# Patient Record
Sex: FEMALE | Race: BLACK OR AFRICAN AMERICAN | NOT HISPANIC OR LATINO | Employment: STUDENT | URBAN - METROPOLITAN AREA
[De-identification: names, ages, dates, MRNs, and addresses within clinical notes are randomized per-mention and may not be internally consistent; named-entity substitution may affect disease eponyms.]

---

## 2019-02-06 ENCOUNTER — HOSPITAL ENCOUNTER (EMERGENCY)
Facility: CLINIC | Age: 19
Discharge: HOME | End: 2019-02-06
Attending: EMERGENCY MEDICINE | Admitting: EMERGENCY MEDICINE
Payer: COMMERCIAL

## 2019-02-06 ENCOUNTER — APPOINTMENT (OUTPATIENT)
Dept: GENERAL RADIOLOGY | Facility: CLINIC | Age: 19
End: 2019-02-06
Attending: EMERGENCY MEDICINE
Payer: COMMERCIAL

## 2019-02-06 VITALS
OXYGEN SATURATION: 98 % | DIASTOLIC BLOOD PRESSURE: 79 MMHG | SYSTOLIC BLOOD PRESSURE: 113 MMHG | RESPIRATION RATE: 16 BRPM | BODY MASS INDEX: 21.66 KG/M2 | TEMPERATURE: 98.2 F | HEART RATE: 71 BPM | WEIGHT: 130 LBS | HEIGHT: 65 IN
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
## 2019-02-06 DIAGNOSIS — M54.9 ACUTE MIDLINE BACK PAIN, UNSPECIFIED BACK LOCATION: ICD-10-CM

## 2019-02-06 LAB
ALBUMIN UR-MCNC: NEGATIVE MG/DL
APPEARANCE UR: ABNORMAL
BILIRUB UR QL STRIP: NEGATIVE
COLOR UR AUTO: YELLOW
GLUCOSE UR STRIP-MCNC: NEGATIVE MG/DL
HCG UR QL: NEGATIVE
HGB UR QL STRIP: ABNORMAL
KETONES UR STRIP-MCNC: 80 MG/DL
LEUKOCYTE ESTERASE UR QL STRIP: NEGATIVE
MUCOUS THREADS #/AREA URNS LPF: PRESENT /LPF
NITRATE UR QL: NEGATIVE
PH UR STRIP: 7.5 PH (ref 5–7)
RBC #/AREA URNS AUTO: 0 /HPF (ref 0–2)
SOURCE: ABNORMAL
SP GR UR STRIP: 1.01 (ref 1–1.03)
SQUAMOUS #/AREA URNS AUTO: 2 /HPF (ref 0–1)
TRANS CELLS #/AREA URNS HPF: <1 /HPF (ref 0–1)
UROBILINOGEN UR STRIP-MCNC: NORMAL MG/DL (ref 0–2)
WBC #/AREA URNS AUTO: 2 /HPF (ref 0–5)

## 2019-02-06 PROCEDURE — 25000132 ZZH RX MED GY IP 250 OP 250 PS 637: Performed by: EMERGENCY MEDICINE

## 2019-02-06 PROCEDURE — 81025 URINE PREGNANCY TEST: CPT | Performed by: EMERGENCY MEDICINE

## 2019-02-06 PROCEDURE — 99284 EMERGENCY DEPT VISIT MOD MDM: CPT | Mod: Z6 | Performed by: EMERGENCY MEDICINE

## 2019-02-06 PROCEDURE — 72072 X-RAY EXAM THORAC SPINE 3VWS: CPT

## 2019-02-06 PROCEDURE — 72100 X-RAY EXAM L-S SPINE 2/3 VWS: CPT

## 2019-02-06 PROCEDURE — 99285 EMERGENCY DEPT VISIT HI MDM: CPT | Performed by: EMERGENCY MEDICINE

## 2019-02-06 PROCEDURE — 72040 X-RAY EXAM NECK SPINE 2-3 VW: CPT

## 2019-02-06 PROCEDURE — 81001 URINALYSIS AUTO W/SCOPE: CPT | Performed by: EMERGENCY MEDICINE

## 2019-02-06 RX ORDER — CYCLOBENZAPRINE HCL 10 MG
10 TABLET ORAL 3 TIMES DAILY PRN
Qty: 10 TABLET | Refills: 0 | Status: SHIPPED | OUTPATIENT
Start: 2019-02-06 | End: 2021-02-13

## 2019-02-06 RX ORDER — IBUPROFEN 600 MG/1
600 TABLET, FILM COATED ORAL ONCE
Status: COMPLETED | OUTPATIENT
Start: 2019-02-06 | End: 2019-02-06

## 2019-02-06 RX ORDER — HYDROCODONE BITARTRATE AND ACETAMINOPHEN 5; 325 MG/1; MG/1
1 TABLET ORAL ONCE
Status: COMPLETED | OUTPATIENT
Start: 2019-02-06 | End: 2019-02-06

## 2019-02-06 RX ADMIN — IBUPROFEN 600 MG: 600 TABLET, FILM COATED ORAL at 18:07

## 2019-02-06 RX ADMIN — HYDROCODONE BITARTRATE AND ACETAMINOPHEN 1 TABLET: 5; 325 TABLET ORAL at 23:23

## 2019-02-06 SDOH — HEALTH STABILITY: MENTAL HEALTH: HOW OFTEN DO YOU HAVE A DRINK CONTAINING ALCOHOL?: NEVER

## 2019-02-06 ASSESSMENT — ENCOUNTER SYMPTOMS
EYE REDNESS: 0
FEVER: 0
BACK PAIN: 1
NECK STIFFNESS: 0
NUMBNESS: 0
ABDOMINAL PAIN: 0
CONFUSION: 0
SHORTNESS OF BREATH: 0
DIFFICULTY URINATING: 0
COLOR CHANGE: 0
ARTHRALGIAS: 0
HEADACHES: 0

## 2019-02-06 ASSESSMENT — MIFFLIN-ST. JEOR: SCORE: 1365.56

## 2019-02-06 NOTE — ED TRIAGE NOTES
Pt. BIBA for fall while slipping on ice, pain in lower back, slight red line noted by EMS, no LOC or hitting head, no neuro symptoms. 18 g R A/C. AVSS in triage.

## 2019-02-06 NOTE — ED AVS SNAPSHOT
[facility], [place], Emergency Department  [address] [place] [id number]  Phone:  [phone]                                    Camelia Corea   MRN: [id number]    Department:  [facility], Emergency Department   Date of Visit:  2/6/2019           After Visit Summary Signature Page    I have received my discharge instructions, and my questions have been answered. I have discussed any challenges I see with this plan with the nurse or doctor.    ..........................................................................................................................................  Patient/Patient Representative Signature      ..........................................................................................................................................  Patient Representative Print Name and Relationship to Patient    ..................................................               ................................................  Date                                   Time    ..........................................................................................................................................  Reviewed by Signature/Title    ...................................................              ..............................................  Date                                               Time          22EPIC Rev 08/18

## 2019-02-07 NOTE — ED PROVIDER NOTES
"  History     Chief Complaint   Patient presents with     Back Pain     The history is provided by the patient and medical records.     Camelia Corea is a 18 year old female who presents to the Emergency Department for evaluation after a fall.  The patient reports she slipped on the ice and struck her back when she landed on cement, at approximately 5 PM.  The patient denies striking her head.  The patient reports she has pain in her \"whole\" back.  The patient was able to get up after the fall, and ambulate from the wheelchair to her bed here.  The patient denies numbness or tingling. No bowel or bladder dysfunction. No other symptoms noted.    Social: The patient is here with her mother.    I have reviewed the Medications, Allergies, Past Medical and Surgical History, and Social History in the Epic system.    History reviewed. No pertinent past medical history.    History reviewed. No pertinent surgical history.    History reviewed. No pertinent family history.    Social History     Tobacco Use     Smoking status: Current Every Day Smoker     Packs/day: 0.50     Smokeless tobacco: Never Used   Substance Use Topics     Alcohol use: No     Frequency: Never       No current facility-administered medications for this encounter.      No current outpatient medications on file.      No Known Allergies    Review of Systems   Constitutional: Negative for fever.   HENT: Negative for congestion.    Eyes: Negative for redness.   Respiratory: Negative for shortness of breath.    Cardiovascular: Negative for chest pain.   Gastrointestinal: Negative for abdominal pain.   Genitourinary: Negative for difficulty urinating.   Musculoskeletal: Positive for back pain. Negative for arthralgias and neck stiffness.        Positive for left shoulder pain   Skin: Negative for color change.   Neurological: Negative for numbness and headaches.        Negative for paraesthesias   Psychiatric/Behavioral: Negative for confusion.   All other " "systems reviewed and are negative.      Physical Exam   BP: 98/67  Heart Rate: 76  Temp: 98.2  F (36.8  C)  Resp: 16  Height: 165.1 cm (5' 5\")  Weight: 59 kg (130 lb)  SpO2: 100 %      Physical Exam   Constitutional: She is oriented to person, place, and time. She appears well-developed and well-nourished. No distress.   HENT:   Head: Normocephalic and atraumatic.   Mouth/Throat: No oropharyngeal exudate.   Eyes: Pupils are equal, round, and reactive to light. Right eye exhibits no discharge. Left eye exhibits no discharge. No scleral icterus.   Neck: Normal range of motion. Neck supple.   Cardiovascular: Normal rate, regular rhythm, normal heart sounds and intact distal pulses. Exam reveals no gallop and no friction rub.   No murmur heard.  Pulmonary/Chest: Effort normal and breath sounds normal. No respiratory distress. She has no wheezes. She exhibits no tenderness.   Abdominal: Soft. Bowel sounds are normal. She exhibits no distension. There is no tenderness.   Musculoskeletal: Normal range of motion. She exhibits no edema or deformity.        Thoracic back: She exhibits tenderness and pain. She exhibits no bony tenderness and no deformity.        Lumbar back: She exhibits tenderness and pain. She exhibits no bony tenderness, no swelling and no deformity.   Neurological: She is alert and oriented to person, place, and time. No cranial nerve deficit.   Strength and sensation in lower extremities bilaterally.  2 out of 4 patellar tendon reflexes bilaterally.  No saddle anesthesia.   Skin: Skin is warm and dry. No rash noted. She is not diaphoretic. No erythema. No pallor.   Psychiatric: She has a normal mood and affect.   Nursing note and vitals reviewed.      ED Course   9:55 PM  The patient was seen and examined by Dr. Johnston in Room [facility].   Results for orders placed or performed during the hospital encounter of 02/06/19   XR Cervical Spine 2/3 Views    Narrative    XR CERVICAL SPINE 2/3 VWS, XR LUMBAR SPINE 2-3 " VIEWS, XR THORACIC  SPINE 3 VW  2/6/2019 10:45 PM      HISTORY: Fall, back pain    COMPARISON: None    FINDINGS: AP and lateral views of the cervical, thoracic, and lumbar  spine. Additional odontoid view of the cervical spine was performed.    Cervical spine: No acute fracture or subluxation. There is normal  alignment of the cervical spine and uninterrupted spinolaminar line.  Prevertebral soft tissues unremarkable. Odontoid process is normal.  Lung apices are clear.    Thoracic spine: No acute fracture or subluxation. Clear lungs. No  pneumothorax. The cervicothoracic junction is unremarkable. No  evidence of rib fracture..    Lumbar spine: No acute fracture or subluxation. No vertebral body  height loss. Intervertebral disc spaces are preserved. Partially  visualized sacrum is normal.      Impression    IMPRESSION: No acute fracture or subluxation throughout the cervical,  thoracic and lumbar spine.   XR Thoracic Spine 3 Views    Narrative    XR CERVICAL SPINE 2/3 VWS, XR LUMBAR SPINE 2-3 VIEWS, XR THORACIC  SPINE 3 VW  2/6/2019 10:45 PM      HISTORY: Fall, back pain    COMPARISON: None    FINDINGS: AP and lateral views of the cervical, thoracic, and lumbar  spine. Additional odontoid view of the cervical spine was performed.    Cervical spine: No acute fracture or subluxation. There is normal  alignment of the cervical spine and uninterrupted spinolaminar line.  Prevertebral soft tissues unremarkable. Odontoid process is normal.  Lung apices are clear.    Thoracic spine: No acute fracture or subluxation. Clear lungs. No  pneumothorax. The cervicothoracic junction is unremarkable. No  evidence of rib fracture..    Lumbar spine: No acute fracture or subluxation. No vertebral body  height loss. Intervertebral disc spaces are preserved. Partially  visualized sacrum is normal.      Impression    IMPRESSION: No acute fracture or subluxation throughout the cervical,  thoracic and lumbar spine.   XR Lumbar Spine 2/3  Views    Narrative    XR CERVICAL SPINE 2/3 VWS, XR LUMBAR SPINE 2-3 VIEWS, XR THORACIC  SPINE 3 VW  2/6/2019 10:45 PM      HISTORY: Fall, back pain    COMPARISON: None    FINDINGS: AP and lateral views of the cervical, thoracic, and lumbar  spine. Additional odontoid view of the cervical spine was performed.    Cervical spine: No acute fracture or subluxation. There is normal  alignment of the cervical spine and uninterrupted spinolaminar line.  Prevertebral soft tissues unremarkable. Odontoid process is normal.  Lung apices are clear.    Thoracic spine: No acute fracture or subluxation. Clear lungs. No  pneumothorax. The cervicothoracic junction is unremarkable. No  evidence of rib fracture..    Lumbar spine: No acute fracture or subluxation. No vertebral body  height loss. Intervertebral disc spaces are preserved. Partially  visualized sacrum is normal.      Impression    IMPRESSION: No acute fracture or subluxation throughout the cervical,  thoracic and lumbar spine.   UA reflex to Microscopic and Culture   Result Value Ref Range    Color Urine Yellow     Appearance Urine Slightly Cloudy     Glucose Urine Negative NEG^Negative mg/dL    Bilirubin Urine Negative NEG^Negative    Ketones Urine 80 (A) NEG^Negative mg/dL    Specific Gravity Urine 1.015 1.003 - 1.035    Blood Urine Trace (A) NEG^Negative    pH Urine 7.5 (H) 5.0 - 7.0 pH    Protein Albumin Urine Negative NEG^Negative mg/dL    Urobilinogen mg/dL Normal 0.0 - 2.0 mg/dL    Nitrite Urine Negative NEG^Negative    Leukocyte Esterase Urine Negative NEG^Negative    Source Midstream Urine     RBC Urine 0 0 - 2 /HPF    WBC Urine 2 0 - 5 /HPF    Squamous Epithelial /HPF Urine 2 (H) 0 - 1 /HPF    Transitional Epi <1 0 - 1 /HPF    Mucous Urine Present (A) NEG^Negative /LPF   HCG qualitative urine (UPT)   Result Value Ref Range    HCG Qual Urine Negative NEG^Negative        Procedures             Critical Care time:  none             Labs Ordered and Resulted from  Time of ED Arrival Up to the Time of Departure from the ED   UA MACROSCOPIC WITH REFLEX TO MICRO AND CULTURE - Abnormal; Notable for the following components:       Result Value    Ketones Urine 80 (*)     Blood Urine Trace (*)     pH Urine 7.5 (*)     Squamous Epithelial /HPF Urine 2 (*)     Mucous Urine Present (*)     All other components within normal limits   HCG QUALITATIVE URINE            Assessments & Plan (with Medical Decision Making)   This is a 18-year-old female with back pain after a fall after slipping on ice.  She has pain across her entire back thoracic and lumbar region.  She has had no bowel or bladder dysfunction no numbness tingling or weakness.  On exam she did have diffuse tenderness across her back.  She had full strength and sensation in lower extremities.  She did not have any saddle anesthesia.  UA showed no blood.  Pregnancy test was negative.  X-rays showed no acute abnormalities or fracture.  Patient was given ibuprofen as well as hydrocodone acetaminophen in the emergency department.  I discussed all results with patient and family.  We will discharge home on a muscle relaxer.  Discussed reasons to return the emergency department. Discussed reasons to return to the emergency department.  Patient understands and agrees with this plan.    I have reviewed the nursing notes.    I have reviewed the findings, diagnosis, plan and need for follow up with the patient.       Medication List      There are no discharge medications for this visit.         Final diagnoses:   None   Chong SINGH, am serving as a trained medical scribe to document services personally performed by Antonio Johnston DO, based on the provider's statements to me.      Antonio SINGH DO, was physically present and have reviewed and verified the accuracy of this note documented by Chong Chauhan.    2/6/2019   [facility], EMERGENCY DEPARTMENT     Antonio Johnston DO  02/07/19 0112

## 2021-02-13 ENCOUNTER — APPOINTMENT (OUTPATIENT)
Dept: GENERAL RADIOLOGY | Facility: CLINIC | Age: 21
End: 2021-02-13
Attending: EMERGENCY MEDICINE
Payer: COMMERCIAL

## 2021-02-13 ENCOUNTER — APPOINTMENT (OUTPATIENT)
Dept: ULTRASOUND IMAGING | Facility: CLINIC | Age: 21
End: 2021-02-13
Attending: EMERGENCY MEDICINE
Payer: COMMERCIAL

## 2021-02-13 ENCOUNTER — APPOINTMENT (OUTPATIENT)
Dept: CT IMAGING | Facility: CLINIC | Age: 21
End: 2021-02-13
Attending: EMERGENCY MEDICINE
Payer: COMMERCIAL

## 2021-02-13 ENCOUNTER — HOSPITAL ENCOUNTER (EMERGENCY)
Facility: CLINIC | Age: 21
Discharge: HOME | End: 2021-02-13
Attending: EMERGENCY MEDICINE | Admitting: EMERGENCY MEDICINE
Payer: COMMERCIAL

## 2021-02-13 VITALS
HEIGHT: 65 IN | WEIGHT: 121.2 LBS | SYSTOLIC BLOOD PRESSURE: 123 MMHG | TEMPERATURE: 98.3 F | HEART RATE: 105 BPM | DIASTOLIC BLOOD PRESSURE: 70 MMHG | BODY MASS INDEX: 20.19 KG/M2 | RESPIRATION RATE: 16 BRPM | OXYGEN SATURATION: 100 %

## 2021-02-13 DIAGNOSIS — R10.9 ABDOMINAL PAIN DURING PREGNANCY IN FIRST TRIMESTER: ICD-10-CM

## 2021-02-13 DIAGNOSIS — O26.891 ABDOMINAL PAIN DURING PREGNANCY IN FIRST TRIMESTER: ICD-10-CM

## 2021-02-13 DIAGNOSIS — B34.9 VIRAL SYNDROME: ICD-10-CM

## 2021-02-13 LAB
ALBUMIN SERPL-MCNC: 3.9 G/DL (ref 3.4–5)
ALP SERPL-CCNC: 101 U/L (ref 40–150)
ALT SERPL W P-5'-P-CCNC: 13 U/L (ref 0–50)
ANION GAP SERPL CALCULATED.3IONS-SCNC: 5 MMOL/L (ref 3–14)
AST SERPL W P-5'-P-CCNC: 13 U/L (ref 0–45)
B-HCG SERPL-ACNC: ABNORMAL IU/L (ref 0–5)
BASOPHILS # BLD AUTO: 0 10E9/L (ref 0–0.2)
BASOPHILS NFR BLD AUTO: 0.5 %
BILIRUB SERPL-MCNC: 0.5 MG/DL (ref 0.2–1.3)
BUN SERPL-MCNC: 10 MG/DL (ref 7–30)
CALCIUM SERPL-MCNC: 9 MG/DL (ref 8.5–10.1)
CHLORIDE SERPL-SCNC: 106 MMOL/L (ref 94–109)
CO2 SERPL-SCNC: 26 MMOL/L (ref 20–32)
CREAT SERPL-MCNC: 0.55 MG/DL (ref 0.52–1.04)
DIFFERENTIAL METHOD BLD: NORMAL
EOSINOPHIL # BLD AUTO: 0 10E9/L (ref 0–0.7)
EOSINOPHIL NFR BLD AUTO: 0.4 %
ERYTHROCYTE [DISTWIDTH] IN BLOOD BY AUTOMATED COUNT: 12.9 % (ref 10–15)
FLUAV RNA RESP QL NAA+PROBE: NEGATIVE
FLUBV RNA RESP QL NAA+PROBE: NEGATIVE
GFR SERPL CREATININE-BSD FRML MDRD: >90 ML/MIN/{1.73_M2}
GLUCOSE SERPL-MCNC: 97 MG/DL (ref 70–99)
HCG SERPL QL: POSITIVE
HCT VFR BLD AUTO: 37.9 % (ref 35–47)
HGB BLD-MCNC: 12.4 G/DL (ref 11.7–15.7)
IMM GRANULOCYTES # BLD: 0 10E9/L (ref 0–0.4)
IMM GRANULOCYTES NFR BLD: 0.2 %
INTERPRETATION ECG - MUSE: NORMAL
LABORATORY COMMENT REPORT: NORMAL
LACTATE BLD-SCNC: 1.3 MMOL/L (ref 0.7–2)
LIPASE SERPL-CCNC: 59 U/L (ref 73–393)
LYMPHOCYTES # BLD AUTO: 0.8 10E9/L (ref 0.8–5.3)
LYMPHOCYTES NFR BLD AUTO: 15.2 %
MCH RBC QN AUTO: 28.1 PG (ref 26.5–33)
MCHC RBC AUTO-ENTMCNC: 32.7 G/DL (ref 31.5–36.5)
MCV RBC AUTO: 86 FL (ref 78–100)
MONOCYTES # BLD AUTO: 0.3 10E9/L (ref 0–1.3)
MONOCYTES NFR BLD AUTO: 4.9 %
NEUTROPHILS # BLD AUTO: 4.4 10E9/L (ref 1.6–8.3)
NEUTROPHILS NFR BLD AUTO: 78.8 %
NRBC # BLD AUTO: 0 10*3/UL
NRBC BLD AUTO-RTO: 0 /100
PLATELET # BLD AUTO: 335 10E9/L (ref 150–450)
POTASSIUM SERPL-SCNC: 3.6 MMOL/L (ref 3.4–5.3)
PROT SERPL-MCNC: 7.7 G/DL (ref 6.8–8.8)
RBC # BLD AUTO: 4.42 10E12/L (ref 3.8–5.2)
RSV RNA SPEC QL NAA+PROBE: NEGATIVE
SARS-COV-2 RNA RESP QL NAA+PROBE: NEGATIVE
SODIUM SERPL-SCNC: 137 MMOL/L (ref 133–144)
SPECIMEN SOURCE: NORMAL
TROPONIN I SERPL-MCNC: <0.015 UG/L (ref 0–0.04)
WBC # BLD AUTO: 5.5 10E9/L (ref 4–11)

## 2021-02-13 PROCEDURE — 84702 CHORIONIC GONADOTROPIN TEST: CPT | Performed by: EMERGENCY MEDICINE

## 2021-02-13 PROCEDURE — 76801 OB US < 14 WKS SINGLE FETUS: CPT

## 2021-02-13 PROCEDURE — 74177 CT ABD & PELVIS W/CONTRAST: CPT

## 2021-02-13 PROCEDURE — 83690 ASSAY OF LIPASE: CPT | Performed by: EMERGENCY MEDICINE

## 2021-02-13 PROCEDURE — 80053 COMPREHEN METABOLIC PANEL: CPT | Performed by: EMERGENCY MEDICINE

## 2021-02-13 PROCEDURE — 96374 THER/PROPH/DIAG INJ IV PUSH: CPT | Mod: 59 | Performed by: EMERGENCY MEDICINE

## 2021-02-13 PROCEDURE — 76801 OB US < 14 WKS SINGLE FETUS: CPT | Mod: 26 | Performed by: RADIOLOGY

## 2021-02-13 PROCEDURE — 93010 ELECTROCARDIOGRAM REPORT: CPT | Performed by: EMERGENCY MEDICINE

## 2021-02-13 PROCEDURE — 76817 TRANSVAGINAL US OBSTETRIC: CPT | Mod: 26 | Performed by: RADIOLOGY

## 2021-02-13 PROCEDURE — 83605 ASSAY OF LACTIC ACID: CPT | Performed by: EMERGENCY MEDICINE

## 2021-02-13 PROCEDURE — 84484 ASSAY OF TROPONIN QUANT: CPT | Performed by: EMERGENCY MEDICINE

## 2021-02-13 PROCEDURE — 71045 X-RAY EXAM CHEST 1 VIEW: CPT

## 2021-02-13 PROCEDURE — 71045 X-RAY EXAM CHEST 1 VIEW: CPT | Mod: 26 | Performed by: RADIOLOGY

## 2021-02-13 PROCEDURE — 93005 ELECTROCARDIOGRAM TRACING: CPT | Performed by: EMERGENCY MEDICINE

## 2021-02-13 PROCEDURE — 74177 CT ABD & PELVIS W/CONTRAST: CPT | Mod: 26 | Performed by: RADIOLOGY

## 2021-02-13 PROCEDURE — 96361 HYDRATE IV INFUSION ADD-ON: CPT | Performed by: EMERGENCY MEDICINE

## 2021-02-13 PROCEDURE — 87636 SARSCOV2 & INF A&B AMP PRB: CPT | Performed by: EMERGENCY MEDICINE

## 2021-02-13 PROCEDURE — 85025 COMPLETE CBC W/AUTO DIFF WBC: CPT | Performed by: EMERGENCY MEDICINE

## 2021-02-13 PROCEDURE — 250N000011 HC RX IP 250 OP 636: Performed by: EMERGENCY MEDICINE

## 2021-02-13 PROCEDURE — 84703 CHORIONIC GONADOTROPIN ASSAY: CPT | Performed by: EMERGENCY MEDICINE

## 2021-02-13 PROCEDURE — 99285 EMERGENCY DEPT VISIT HI MDM: CPT | Mod: 25 | Performed by: EMERGENCY MEDICINE

## 2021-02-13 PROCEDURE — 258N000003 HC RX IP 258 OP 636: Performed by: EMERGENCY MEDICINE

## 2021-02-13 PROCEDURE — 96375 TX/PRO/DX INJ NEW DRUG ADDON: CPT | Performed by: EMERGENCY MEDICINE

## 2021-02-13 RX ORDER — IOPAMIDOL 755 MG/ML
74 INJECTION, SOLUTION INTRAVASCULAR ONCE
Status: COMPLETED | OUTPATIENT
Start: 2021-02-13 | End: 2021-02-13

## 2021-02-13 RX ORDER — ONDANSETRON 2 MG/ML
4 INJECTION INTRAMUSCULAR; INTRAVENOUS ONCE
Status: COMPLETED | OUTPATIENT
Start: 2021-02-13 | End: 2021-02-13

## 2021-02-13 RX ORDER — SODIUM CHLORIDE 9 MG/ML
INJECTION, SOLUTION INTRAVENOUS CONTINUOUS
Status: DISCONTINUED | OUTPATIENT
Start: 2021-02-13 | End: 2021-02-13 | Stop reason: HOSPADM

## 2021-02-13 RX ORDER — KETOROLAC TROMETHAMINE 15 MG/ML
15 INJECTION, SOLUTION INTRAMUSCULAR; INTRAVENOUS ONCE
Status: COMPLETED | OUTPATIENT
Start: 2021-02-13 | End: 2021-02-13

## 2021-02-13 RX ADMIN — SODIUM CHLORIDE 1000 ML: 9 INJECTION, SOLUTION INTRAVENOUS at 14:47

## 2021-02-13 RX ADMIN — IOPAMIDOL 74 ML: 755 INJECTION, SOLUTION INTRAVENOUS at 15:09

## 2021-02-13 RX ADMIN — SODIUM CHLORIDE: 9 INJECTION, SOLUTION INTRAVENOUS at 16:06

## 2021-02-13 RX ADMIN — KETOROLAC TROMETHAMINE 15 MG: 15 INJECTION, SOLUTION INTRAMUSCULAR; INTRAVENOUS at 14:49

## 2021-02-13 RX ADMIN — ONDANSETRON 4 MG: 2 INJECTION INTRAMUSCULAR; INTRAVENOUS at 14:48

## 2021-02-13 ASSESSMENT — MIFFLIN-ST. JEOR: SCORE: 1315.64

## 2021-02-13 NOTE — ED TRIAGE NOTES
Patient presents ambulatory to triage with c/o abdominal pain and fever. Patient reports abdominal pain,  subjective fever, and runny nose since Monday. Patient also reports nausea; denies vomiting, diarrhea, and urinary symptoms.

## 2021-02-13 NOTE — ED NOTES
Assumed care for patient. Pt stable at this time and appears to be going to CT shortly. Pt VSS. Will continue to monitor.

## 2021-02-13 NOTE — ED PROVIDER NOTES
[place] EMERGENCY DEPARTMENT ([facility])  2/13/21    History     Chief Complaint   Patient presents with     Abdominal Pain     Fever     The history is provided by the patient and medical records.     Camelia Corea is a 20 year old otherwise healthy female who presents to the Emergency Department with abdominal pain, vomiting, and fever. Patient reports she has been feeling sick since Monday, 2/8. She states she went out in the cold and began having chest tightness following this. The chest tightness then resolved. Patient reports following this she began having lower abdominal pain, nausea, and vomiting. She states the abdominal pain is intermittent and cramping but the past 2 days it has been constant. Patient reports 4 episodes of vomiting yesterday and 1-2 episodes the days prior. She states she has not vomited today but continues to feel nauseous. Patient reports she has been drinking water but has not eaten today. Patient denies cough, shortness of breath, sore throat, myalgias, or loss of sense of taste or smell. No vaginal discharge, vaginal bleeding, or urinary symptoms. Patient denies diarrhea. She states she had a BM yesterday and it was normal. No blood in the stool. Patient denies history of asthma, cardiac issues, or abdominal surgery. No known COVID exposure or sick contacts. Patient is sexually active. She is unsure if she could be pregnant. She is not on birth control. Patient reports her last menstrual period was in January. She states she gets a period every month but it does not always come around the same time of the month.    Past Medical History  History reviewed. No pertinent past medical history.  History reviewed. No pertinent surgical history.  No current outpatient medications on file.    No Known Allergies  Family History  No family history on file.  Social History   Social History     Tobacco Use     Smoking status: Former Smoker     Packs/day: 0.50     Smokeless tobacco:  "Current User   Substance Use Topics     Alcohol use: No     Frequency: Never     Drug use: Yes     Types: Marijuana      Past medical history, past surgical history, medications, allergies, family history, and social history were reviewed with the patient. No additional pertinent items.       Review of Systems  A complete review of systems was performed with pertinent positives and negatives noted in the HPI, and all other systems negative.    Physical Exam   BP: 128/68  Pulse: 111  Temp: 98.3  F (36.8  C)  Resp: 16  Height: 165.1 cm (5' 5\")  Weight: 55 kg (121 lb 3.2 oz)(standing scale)  SpO2: 100 %  Physical Exam  Vitals signs reviewed.   Constitutional:       General: She is not in acute distress.     Appearance: She is well-developed.   HENT:      Head: Normocephalic and atraumatic.      Mouth/Throat:      Mouth: Mucous membranes are moist.      Pharynx: No oropharyngeal exudate or posterior oropharyngeal erythema.   Eyes:      Extraocular Movements: Extraocular movements intact.      Conjunctiva/sclera: Conjunctivae normal.      Pupils: Pupils are equal, round, and reactive to light.   Neck:      Musculoskeletal: Normal range of motion and neck supple. No neck rigidity.   Cardiovascular:      Rate and Rhythm: Normal rate and regular rhythm.      Pulses: Normal pulses.      Heart sounds: Normal heart sounds. No murmur.   Pulmonary:      Effort: Pulmonary effort is normal. No respiratory distress.      Breath sounds: Normal breath sounds. No wheezing or rales.   Abdominal:      General: Bowel sounds are normal. There is no distension.      Palpations: Abdomen is soft. There is no mass.      Tenderness: There is no right CVA tenderness, left CVA tenderness, guarding or rebound.      Comments: Mild bilateral lower quadrant abdominal tenderness. Negative bean's sign.    Musculoskeletal: Normal range of motion.         General: No swelling or tenderness.   Skin:     General: Skin is warm and dry.      Capillary " Refill: Capillary refill takes less than 2 seconds.      Findings: No rash.   Neurological:      General: No focal deficit present.      Mental Status: She is alert and oriented to person, place, and time.      GCS: GCS eye subscore is 4. GCS verbal subscore is 5. GCS motor subscore is 6.      Cranial Nerves: No cranial nerve deficit.      Sensory: No sensory deficit.      Motor: No weakness.   Psychiatric:         Mood and Affect: Mood normal.         ED Course     ED Course as of Mar 31 0651   Sat Feb 13, 2021   1537 Qualitative HCG was still pending and I was contacting lab to obtain this result. It resulted positive and I then noted that the patient had already been transported to CT and had already been scanned. I discussed with the patient's nurse and charge nurse the concern about why this patient had been sent to the CT scanner without this result back. She also had unfortunately already received Toradol which I had not wanted given prior to pregnancy result.       1835 Charge nurse contacted CT who stated they had transported the patient with the pregnancy test pending and had started the scan without checking for the finalized result.       1836 Patient eloped after her pelvic US was performed while I was with another patient performing a procedure. Thus, I was unable to provide her follow up resources, instructions, or indications for return. I was also unable to discuss her pelvic US results.          2:18 PM  The patient was seen and examined by Kathia Kline MD in Room ED08.   Procedures             EKG Interpretation:      Interpreted by Kathia Kline MD  Time reviewed: 1436  Symptoms at time of EKG: previous chest tightness, now resolved   Rhythm: normal sinus with sinus arrhythmia   Rate: normal  Axis: normal  Ectopy: none  Conduction: normal  ST Segments/ T Waves: No ST-T wave changes  Q Waves: none  Comparison to prior: No old EKG available    Clinical Impression: no evidence of acute  ischemia               Results for orders placed or performed during the hospital encounter of 02/13/21   XR Chest Port 1 View     Status: None    Narrative    EXAM: XR CHEST PORT 1 VW  2/13/2021 2:49 PM     HISTORY:  question of COVID, eval for pneumonia       COMPARISON:  None    FINDINGS: Single view of the chest. Trachea is midline. Cardiac  mediastinal silhouette is within normal limits. No focal airspace  opacity. No pleural effusion or pneumothorax.       Impression    IMPRESSION: Clear lungs.    I have personally reviewed the examination and initial interpretation  and I agree with the findings.    JAYDEN ROBERTO MD   CT Abdomen Pelvis w Contrast     Status: None    Narrative    CT of the Abdomen and Pelvis with contrast, 2/13/2021 3:22 PM.    Comparison: None.    History: RLQ abdominal pain, appendicitis suspected (Age >= 14y);  vomiting, RLQ abdominal pain, subjective fever, eval for appendicitis,  etc.     Technique: Axial images of the  abdomen and pelvis were obtained with  contrast. Coronal reconstructions were provided. Images were reviewed  in bone, lung, and soft tissue windows.     Total DLP: 543 mGy*cm.    Contrast: Isovue 370    Findings:    Chest:The visualized esophagus appears unremarkable. No suspicious  lung nodules. No evidence of lung infection. No pleural effusion.  Heart size within normal limits..      Abdomen and Pelvis: Subcentimeter hyperattenuating focus at the dome  of the liver likely representing simple hepatic cyst but too small to  evaluate by tomography. No opaque gallbladder calculi. No intrahepatic  or extrahepatic biliary dilatation. Pancreas unremarkable. Spleen size  within normal limits. No suspicious adrenal mass lesions.Symmetric  nephrographic renal phase. No evidence of hydronephrosis. Visualized  ureters and urinary bladder is unremarkable.  Heterogeneous appearance  of the uterine with fluid layering within the uterine cavity and mild  periuterine fluid.  No  diverticulitis. No evidence of bowel  obstruction. The appendix is not confidently visualized but there are  no periappendiceal inflammatory changes that would be expected in the  setting of appendicitis. Abdominal vasculature is unremarkable on this  noncontrast exam. No suspicious or enlarged mesenteric,  retroperitoneal and pelvic lymph nodes.     Bones and Soft Tissues: No suspicious osseous lesion. No suspicious  soft tissue mass.         Impression    Impression:   1. The appendix is not confidently visualized and there are no  secondary signs of appendicitis.  2. Heterogeneous appearance of the uterus with fluid distention of the  endometrial canal and trace pelvic ascites. At the time of CT  scanning, patient's beta hCG was still pending. Following the CT scan,  beta hCG returned as positive and findings on CT are consistent with  early pregnancy. This was discussed with the CT technologist at the  time of dictation and review. The emergency room provider is already  aware of this and has also discussed with the CT technologist.    I have personally reviewed the examination and initial interpretation  and I agree with the findings.    JAYDEN ROBERTO MD   US OB <14 Weeks W Transvaginal     Status: None    Narrative    EXAMINATION: US OB <14 WEEKS WITH TRANSVAGINAL SINGLE, 2/13/2021 5:25  PM     COMPARISON: CT abdomen and pelvis 2/13/2021.    HISTORY: positive pregnancy test, lower abdominal pain, eval for  pregnancy location.     TECHNIQUE: The pelvis was scanned in standard fashion with  transabdominal and transvaginal transducer(s).    FINDINGS: There is a gestational sac within the uterine fundus. The  amniotic fluid volume and sac size are within expected limits for  gestation age. There is an embryo and yolk sac present within the  gestational sac. The embryo has a crown-rump length of 4 mm  corresponding to 6 weeks 1 day gestation. Embryonic heart motion is  present at 128 beats per minute.  It is  too early to accurately  determine placental site.    There is no free fluid in the pelvis.  No uterine masses. Right ovary  not visualized. The left ovary measures 3.2 x 2.5 x 1.6. There is  normal blood flow to the left ovary.      Impression    IMPRESSION:    There is a single living intrauterine embryo with  ultrasound gestational age of 6 weeks 1 day corresponding to  ultrasound estimated date of delivery of 10/9/2021.     I have personally reviewed the examination and initial interpretation  and I agree with the findings.    JAYDEN ROBERTO MD   CBC with platelets differential     Status: None   Result Value Ref Range    WBC 5.5 4.0 - 11.0 10e9/L    RBC Count 4.42 3.8 - 5.2 10e12/L    Hemoglobin 12.4 11.7 - 15.7 g/dL    Hematocrit 37.9 35.0 - 47.0 %    MCV 86 78 - 100 fl    MCH 28.1 26.5 - 33.0 pg    MCHC 32.7 31.5 - 36.5 g/dL    RDW 12.9 10.0 - 15.0 %    Platelet Count 335 150 - 450 10e9/L    Diff Method Automated Method     % Neutrophils 78.8 %    % Lymphocytes 15.2 %    % Monocytes 4.9 %    % Eosinophils 0.4 %    % Basophils 0.5 %    % Immature Granulocytes 0.2 %    Nucleated RBCs 0 0 /100    Absolute Neutrophil 4.4 1.6 - 8.3 10e9/L    Absolute Lymphocytes 0.8 0.8 - 5.3 10e9/L    Absolute Monocytes 0.3 0.0 - 1.3 10e9/L    Absolute Eosinophils 0.0 0.0 - 0.7 10e9/L    Absolute Basophils 0.0 0.0 - 0.2 10e9/L    Abs Immature Granulocytes 0.0 0 - 0.4 10e9/L    Absolute Nucleated RBC 0.0    Comprehensive metabolic panel     Status: None   Result Value Ref Range    Sodium 137 133 - 144 mmol/L    Potassium 3.6 3.4 - 5.3 mmol/L    Chloride 106 94 - 109 mmol/L    Carbon Dioxide 26 20 - 32 mmol/L    Anion Gap 5 3 - 14 mmol/L    Glucose 97 70 - 99 mg/dL    Urea Nitrogen 10 7 - 30 mg/dL    Creatinine 0.55 0.52 - 1.04 mg/dL    GFR Estimate >90 >60 mL/min/[1.73_m2]    GFR Estimate If Black >90 >60 mL/min/[1.73_m2]    Calcium 9.0 8.5 - 10.1 mg/dL    Bilirubin Total 0.5 0.2 - 1.3 mg/dL    Albumin 3.9 3.4 - 5.0 g/dL     Protein Total 7.7 6.8 - 8.8 g/dL    Alkaline Phosphatase 101 40 - 150 U/L    ALT 13 0 - 50 U/L    AST 13 0 - 45 U/L   Lipase     Status: Abnormal   Result Value Ref Range    Lipase 59 (L) 73 - 393 U/L   Lactic acid whole blood     Status: None   Result Value Ref Range    Lactic Acid 1.3 0.7 - 2.0 mmol/L   HCG qualitative Blood     Status: Abnormal   Result Value Ref Range    HCG Qualitative Serum Positive (A) NEG^Negative   Symptomatic Influenza A/B & SARS-CoV2 (COVID-19) Virus PCR Multiplex     Status: None    Specimen: Nasopharyngeal   Result Value Ref Range    Flu A/B & SARS-COV-2 PCR Source Nasopharyngeal     SARS-CoV-2 PCR Result NEGATIVE     Influenza A PCR Negative NEG^Negative    Influenza B PCR Negative NEG^Negative    Respiratory Syncytial Virus PCR Negative NEG^Negative    Flu A/B & SARS-CoV-2 PCR Comment (Note)    Troponin I     Status: None   Result Value Ref Range    Troponin I ES <0.015 0.000 - 0.045 ug/L   HCG quantitative pregnancy     Status: Abnormal   Result Value Ref Range    HCG Quantitative Serum 86,155 (H) 0 - 5 IU/L   EKG 12 lead     Status: None   Result Value Ref Range    Interpretation ECG Click View Image link to view waveform and result      Medications   0.9% sodium chloride BOLUS (0 mLs Intravenous Stopped 2/13/21 1600)   ondansetron (ZOFRAN) injection 4 mg (4 mg Intravenous Given 2/13/21 1448)   ketorolac (TORADOL) injection 15 mg (15 mg Intravenous Given 2/13/21 1449)   iopamidol (ISOVUE-370) solution 74 mL (74 mLs Intravenous Given 2/13/21 1509)   sodium chloride (PF) 0.9% PF flush 69 mL (69 mLs Intravenous Given 2/13/21 1509)        Assessments & Plan (with Medical Decision Making)   Patient presents with subjective fever as well as abdominal pain and vomiting.  She has mild lower abdominal tenderness bilaterally on exam without signs of peritonitis.  She is overall well-appearing and in no acute distress.  She is initially slightly tachycardic which resolved spontaneously.  She  states she had a very brief episode of chest tightness when going out in the very cold weather that resolved spontaneously and is no longer had any further chest pain or tightness.  She is very low risk for any cardiac disease and thus we felt this would be unlikely to be acute coronary syndrome.  We did obtain a troponin which was less than 0.015.  EKG was obtained which did not reveal any evidence of acute ischemia.  She has had no shortness of breath or cough.  This was only very brief chest tightness specifically when going out in the cold and then resolving we felt that PE would be unlikely.  With her abdominal pain and tenderness and vomiting differential diagnosis includes but is not limited to appendicitis, ovarian pathology, viral syndrome including the potential of COVID-19, pregnancy, ectopic pregnancy, gallbladder pathology, pancreatitis, bowel obstruction, bowel perforation, UTI, pyelonephritis.    Laboratory studies were initiated including qualitative beta-hCG testing as the patient was unsure she could possibly be pregnant.  She initially told me her last menstrual period was in January.  She did state they could be irregular at times.  Chest x-ray and CT of the abdomen pelvis were ordered pending pregnancy test.  COVID-19 PCR was negative for COVID-19, influenza, and RSV.  CBC is completely unremarkable with normal white blood cell count of 5.5 and hemoglobin of 12.4.  CMP is also completely unremarkable with normal renal function as well as normal alk phos, ALT, AST, and bilirubin.  Lactic acid is within normal limits.  Electrolytes are within normal limits.  Lipase is within normal limits.  Patient was given IV fluids and IV Zofran.  IV Toradol was also ordered and pending pregnancy test.  Patient's vital signs otherwise remained within normal limits.  She is afebrile here.  She has not taken her temperature at home.     Unfortunately I found that the patient had gone to radiology prior to her  beta hCG qualitative test resulting and had the discussion as documented below.  I did discuss with the patient that unfortunately she was found to be pregnant and had a CAT scan of her abdomen and pelvis from which the risk to the fetus is unknown.  She voiced understanding and was appreciative of the discussion.  She did ask if we had any family-planning for undesired pregnancy resources related to this and I discussed that I would give her resources and could provide her the OB follow-up number.  Did discuss that we should perform a pelvic ultrasound for further evaluation.  Chest x-ray was unremarkable.  CT of the abdomen and pelvis could not confidently identify the appendix but there was no sign of secondary signs of appendicitis and with her normal white count and no signs of secondary appendicitis and obviously no enlarged appendix felt that appendicitis would be less likely.  Pelvic ultrasound was pending at this time when I was in another room performing a procedure on the patient and was later told by the nurse that the patient could not stay in the ER any longer and left before I could have further discussion or provide her resources that she had asked for or discuss indications for return.  I was also unable to discuss her pelvic ultrasound results which did show IUP at 6 weeks 1 day.  No ovarian pathology.  The nurse had instructed the patient to return for any new or worsening concerns however I was unable to have this discussion with the patient as she had eloped prior to me being aware of her leaving.  The nurse had informed her that she could come back at any time.      ED Course as of Mar 31 0651   Sat Feb 13, 2021   1537 Qualitative HCG was still pending and I was contacting lab to obtain this result. It resulted positive and I then noted that the patient had already been transported to CT and had already been scanned. I discussed with the patient's nurse and charge nurse the concern about why  this patient had been sent to the CT scanner without this result back. She also had unfortunately already received Toradol which I had not wanted given prior to pregnancy result.       1835 Charge nurse contacted CT who stated they had transported the patient with the pregnancy test pending and had started the scan without checking for the finalized result.       1836 Patient eloped after her pelvic US was performed while I was with another patient performing a procedure. Thus, I was unable to provide her follow up resources, instructions, or indications for return. I was also unable to discuss her pelvic US results.         I have reviewed the nursing notes. I have reviewed the findings, diagnosis, plan and need for follow up with the patient.    There are no discharge medications for this patient.      Final diagnoses:   Abdominal pain during pregnancy in first trimester   Viral syndrome     I, Sheri Emmanuel, am serving as a trained medical scribe to document services personally performed by Kathia Kline MD, based on the provider's statements to me.      I, Kathia Kline MD, was physically present and have reviewed and verified the accuracy of this note documented by Sheri Emmanuel.      --  Kathia Kline MD  [facility] EMERGENCY DEPARTMENT  2/13/2021     Kathia Kline MD  03/31/21 0703

## 2023-04-25 ENCOUNTER — TELEPHONE (OUTPATIENT)
Dept: BEHAVIORAL HEALTH | Facility: CLINIC | Age: 23
End: 2023-04-25
Payer: COMMERCIAL

## 2023-08-08 DIAGNOSIS — F11.90 OPIOID USE DISORDER: Primary | ICD-10-CM

## 2023-08-09 ENCOUNTER — OFFICE VISIT (OUTPATIENT)
Dept: BEHAVIORAL HEALTH | Facility: CLINIC | Age: 23
End: 2023-08-09
Payer: COMMERCIAL

## 2023-08-09 ENCOUNTER — TELEPHONE (OUTPATIENT)
Dept: BEHAVIORAL HEALTH | Facility: CLINIC | Age: 23
End: 2023-08-09

## 2023-08-09 VITALS — SYSTOLIC BLOOD PRESSURE: 96 MMHG | OXYGEN SATURATION: 100 % | DIASTOLIC BLOOD PRESSURE: 67 MMHG | HEART RATE: 65 BPM

## 2023-08-09 DIAGNOSIS — F11.93 OPIOID WITHDRAWAL (H): ICD-10-CM

## 2023-08-09 DIAGNOSIS — Z30.09 GENERAL COUNSELING FOR PRESCRIPTION OF ORAL CONTRACEPTIVES: ICD-10-CM

## 2023-08-09 DIAGNOSIS — Z11.3 SCREEN FOR STD (SEXUALLY TRANSMITTED DISEASE): ICD-10-CM

## 2023-08-09 DIAGNOSIS — Z72.0 CURRENT NICOTINE USE: ICD-10-CM

## 2023-08-09 DIAGNOSIS — F11.20 OPIOID USE DISORDER, SEVERE, DEPENDENCE (H): Primary | ICD-10-CM

## 2023-08-09 LAB
AMPHETAMINE QUAL URINE POCT: NEGATIVE
BARBITURATE QUAL URINE POCT: NEGATIVE
BENZODIAZEPINE QUAL URINE POCT: NEGATIVE
BUPRENORPHINE QUAL URINE POCT: NEGATIVE
COCAINE QUAL URINE POCT: NEGATIVE
CREATININE QUAL URINE POCT: ABNORMAL
FENTANYL UR QL: ABNORMAL
HCG UR QL: NEGATIVE
INTERNAL QC QUAL URINE POCT: ABNORMAL
MDMA QUAL URINE POCT: NEGATIVE
METHADONE QUAL URINE POCT: NEGATIVE
METHAMPHETAMINE QUAL URINE POCT: NEGATIVE
OPIATE QUAL URINE POCT: NEGATIVE
OXYCODONE QUAL URINE POCT: NEGATIVE
PH QUAL URINE POCT: ABNORMAL
PHENCYCLIDINE QUAL URINE POCT: NEGATIVE
POCT KIT EXPIRATION DATE: ABNORMAL
POCT KIT LOT NUMBER: ABNORMAL
SPECIFIC GRAVITY POCT: 1.02
TEMPERATURE URINE POCT: ABNORMAL
THC QUAL URINE POCT: ABNORMAL

## 2023-08-09 PROCEDURE — 99000 SPECIMEN HANDLING OFFICE-LAB: CPT | Performed by: FAMILY MEDICINE

## 2023-08-09 PROCEDURE — 81025 URINE PREGNANCY TEST: CPT | Performed by: FAMILY MEDICINE

## 2023-08-09 PROCEDURE — 87591 N.GONORRHOEAE DNA AMP PROB: CPT | Performed by: FAMILY MEDICINE

## 2023-08-09 PROCEDURE — 87491 CHLMYD TRACH DNA AMP PROBE: CPT | Performed by: FAMILY MEDICINE

## 2023-08-09 PROCEDURE — 80307 DRUG TEST PRSMV CHEM ANLYZR: CPT | Performed by: FAMILY MEDICINE

## 2023-08-09 PROCEDURE — 99205 OFFICE O/P NEW HI 60 MIN: CPT | Performed by: FAMILY MEDICINE

## 2023-08-09 PROCEDURE — G0480 DRUG TEST DEF 1-7 CLASSES: HCPCS | Performed by: FAMILY MEDICINE

## 2023-08-09 RX ORDER — METHYLPREDNISOLONE SODIUM SUCCINATE 125 MG/2ML
125 INJECTION, POWDER, LYOPHILIZED, FOR SOLUTION INTRAMUSCULAR; INTRAVENOUS
Status: CANCELLED
Start: 2023-08-16

## 2023-08-09 RX ORDER — LIDOCAINE HYDROCHLORIDE 10 MG/ML
2 INJECTION, SOLUTION EPIDURAL; INFILTRATION; INTRACAUDAL; PERINEURAL ONCE
Status: CANCELLED | OUTPATIENT
Start: 2023-08-16 | End: 2023-08-16

## 2023-08-09 RX ORDER — DIPHENHYDRAMINE HYDROCHLORIDE 50 MG/ML
50 INJECTION INTRAMUSCULAR; INTRAVENOUS
Status: CANCELLED
Start: 2023-08-16

## 2023-08-09 RX ORDER — ALBUTEROL SULFATE 0.83 MG/ML
2.5 SOLUTION RESPIRATORY (INHALATION)
Status: CANCELLED | OUTPATIENT
Start: 2023-08-16

## 2023-08-09 RX ORDER — EPINEPHRINE 1 MG/ML
0.3 INJECTION, SOLUTION, CONCENTRATE INTRAVENOUS EVERY 5 MIN PRN
Status: CANCELLED | OUTPATIENT
Start: 2023-08-16

## 2023-08-09 RX ORDER — CLONIDINE HYDROCHLORIDE 0.1 MG/1
0.1 TABLET ORAL EVERY 6 HOURS PRN
Qty: 20 TABLET | Refills: 0 | Status: SHIPPED | OUTPATIENT
Start: 2023-08-09

## 2023-08-09 RX ORDER — BUPRENORPHINE AND NALOXONE 2; .5 MG/1; MG/1
FILM, SOLUBLE BUCCAL; SUBLINGUAL
Qty: 20 FILM | Refills: 0 | Status: SHIPPED | OUTPATIENT
Start: 2023-08-09 | End: 2023-08-15

## 2023-08-09 RX ORDER — ALBUTEROL SULFATE 90 UG/1
1-2 AEROSOL, METERED RESPIRATORY (INHALATION)
Status: CANCELLED
Start: 2023-08-16

## 2023-08-09 RX ORDER — MEPERIDINE HYDROCHLORIDE 25 MG/ML
25 INJECTION INTRAMUSCULAR; INTRAVENOUS; SUBCUTANEOUS EVERY 30 MIN PRN
Status: CANCELLED | OUTPATIENT
Start: 2023-08-16

## 2023-08-09 ASSESSMENT — PATIENT HEALTH QUESTIONNAIRE - PHQ9: SUM OF ALL RESPONSES TO PHQ QUESTIONS 1-9: 11

## 2023-08-09 NOTE — PATIENT INSTRUCTIONS
Low dose start of buprenorphine using 2mg/0.5mg films or tablets:      Day 1: take 0.5mg buprenorphine (1/4 film or tablet) once.  Continue use of other opioid.     Day 2: take 0.5mg buprenorphine (1/4 film or tablet) twice a day.  Continue use of other opioid.     Day 3: take 1mg buprenorphine (1/2 film or tablet) twice a day.  Continue use of other opioid.     Day 4: take 2mg buprenorphine (1 film or tablet) twice a day.  Continue use of other opioid.    Day 5: take 4mg buprenorphine (2 films or tablets) twice a day. Continue use of other opioid.    Day 6: take 4mg buprenorphine (2 films or tablets) three times a day.  Continue use of other opioid.     Day 7: take 8mg twice a day (using one of the 8mg/2mg films) and stop use of other opioid    Day 8 and forward: continue taking two 8mg/2mg films or tablets every day, or the dose discussed with your provider

## 2023-08-09 NOTE — TELEPHONE ENCOUNTER
Please contact pt daily to follow-up on progress of/symptoms during low dose buprenorphine start.  Planned day 1 to be 8/9/23.

## 2023-08-09 NOTE — PROGRESS NOTES
" [facility] Initial Visit    Patient presents for new visit to the Recovery Clinic. Patient reports she is planning a trip to [place] in 2 weeks; she has no return date planned. When asked goal for visit today, patient states \"I want something to help with withdrawal when I'm in [place].\"    Patient has interest in obtaining Sublocade prior to departure.    Patient reports she snorts Fentanyl daily, approx \"30 pills.\" Last use today at 11am.    Patient has no history with Suboxone.  Patient has not been in CD tx before. Reports \"this is my first time trying.\"    ASSESSMENT/PLAN                                                      1. Opioid use disorder, severe, dependence (H)  23 yo female with 5 yr h/o IN fentanyl use, last use today.  No withdrawal symptoms currently.  Recently told her mother about her use and wants help to stop use because of a planned trip to [facility] in 2 weeks for yet to be determined duration.    Pt is interested in buprenorphine therapy, specifically wants to receive Sublocade injection before departing on her trip.    Reviewed high dose and low dose protocols for starting buprenorphine including potential risks and benefits.  Pt would like to proceed with low dose start.    Reviewed directions for buprenorphine titration starting 0.5mg day 1 and increase to 16mg by day 7.  Discontinue fentanyl use day 7.    Reviewed importance of never using alone, availability of naloxone.   Follow-up by phone frequently during this process.  To facilitate PA process for Sublocade, pt stated she would return to clinic in a few days for UDS.   Follow-up MD visit in one week, reviewed walk in hours.    Start Sublocade when approved by insurance and pt meets criteria.   Encouraged psychosocial interventions and medical treatment for OUD when she returns from her extended stay in [place].    - naloxone (NARCAN) 4 MG/0.1ML nasal spray; Spray 1 spray (4 mg) into one nostril alternating " nostrils as needed every 2-3 minutes until assistance arrives  Dispense: 0.2 mL; Refill: 11  - Drugs of Abuse Screen Urine (POC CUPS) POCT  - buprenorphine HCl-naloxone HCl (SUBOXONE) 2-0.5 MG per film; Take one twice a day, or as directed  Dispense: 20 Film; Refill: 0  - Fentanyl Qualitative with Reflex to Quant Urine; Future  - CBC with Platelets & Differential; Future  - COMPREHENSIVE METABOLIC PANEL; Future  - Fentanyl Qualitative with Reflex to Quant Urine  - Fentanyl and Metabolite Quantitative, Urine    2. Opioid withdrawal (H)  Reviewed goal of low dose initiation is to minimize withdrawal and should be adjusted accordingly.  Clonidine rx for prn use.   - cloNIDine (CATAPRES) 0.1 MG tablet; Take 1 tablet (0.1 mg) by mouth every 6 hours as needed (withdrawal symptoms including hot/cold sweats, anxiety, restlessness, sleeplessness)  Dispense: 20 tablet; Refill: 0    3. General counseling for prescription of oral contraceptives  Pt declined contraception  - HCG qualitative urine; Future  - HCG qualitative urine    4. Screen for STD (sexually transmitted disease)  Pt did not go to lab for blood draw, discuss again at next visit  - NEISSERIA GONORRHOEA PCR  - CHLAMYDIA TRACHOMATIS PCR  - Hepatitis B core antibody; Future  - Hepatitis B Surface Antibody; Future  - Hepatitis B surface antigen; Future  - Hepatitis C Screen Reflex to HCV RNA Quant and Genotype; Future  - HIV Antigen Antibody Combo Cascade; Future  - Treponema Abs w Reflex to RPR and Titer; Future  - Treponema Abs w Reflex to RPR and Titer    5. Current nicotine use  Evaluate pt's goals next visit     Return in 6 days (on 8/15/2023) for Follow up, in person; arrive before 3p please; also nurse visit in a few days for urine drug screen.    Patient counseling completed today:  Discussed mechanism of action, potential risks/benefits/side effects of medications and other recommendations above.    Discussed risk of precipitated withdrawal with initiation  of buprenorphine in the presence of full opioid agonists.    Reviewed directions for initiation of buprenorphine to reduce risk of precipitated withdrawal and maximize efficacy.    Harm reduction counseling including never use alone, availability of naloxone, avoiding combination of opioids with benzodiazepines, alcohol, or other sedatives, safer administration.      Discussed importance of avoiding isolation, building a network of supportive relationships, avoiding people/places/things associated with past use to reduce risk of relapse; including motivational interviewing regarding psychosocial treatment for addiction.     SUBJECTIVE                                                      CC/HPI:  Camelia Corea is a 22 year old female with PMH nicotine use and opioid use disorder who presents to the Recovery Clinic for initial visit.      Brief History:  Camelia Corea was first seen in Recovery Clinic on 08/09/23. They were referred by younger sister who has received services at the Recovery Clinic and is now in [facility] treatment.   Pt is seeking help to stop use of fentanyl because of a planned trip to [place], leaving 2 weeks from initial visit.  Pt would like to receive Sublocade injection before departing on her extended trip     Substance Use History :  Opioids:   Age at first use: 17  Current use: substance: Perc 30s (Fentanyl); quantity 30/day; route: snorts; timing of last use: 11am today;      IV drug use: No   History of overdose: Yes: age 17; revived with Narcan  Previous residential or outpatient treatments for addiction : No  Previous medication treatments for addiction: No  Longest period of sobriety: In June was able to go 8 hours without using while at work; stopped working in June  Medical complications related to substance use: none  Hepatitis C: no record of testing; ordered 8/9/23 but pt did not go to lab  HIV: 7/12/18 HIV ag/ab nonreactive; ordered 8/9/23 but pt did not go to lab    DSM-5 OUD criteria  "met:  Taken in larger amounts/greater time spent in behavior over longer period of time than intended,  Persistent desire or unsuccessful efforts to cut down or control use/behavior,  A great deal of time is spent in activities necessary to obtain the substance/participate in the behavior or recover from its effects,    Cravings,  Continued use/behavior despite having persistent or recurrent social or interpersonal problems caused or exacerbated by effects of use/behavior  Important social, occupational, or recreational activities are given up or reduced because of use/behavior  Continued use/behavior despite knowledge of having a persistent or recurrent physical or psychological problem that is likely to have been caused or exacerbated by use/behavior  Tolerance  Withdrawal    Other Addiction History:  Stimulants: none  Sedatives/hypnotics/anxiolytics:  none  Alcohol: none  Nicotine: vapes  Cannabis: yes, \"sometimes, here and there.\" Last use 8/6/23  Hallucinogens/Dissociatives: none  Eating disorder: none  Gambling: none    [place] Prescription Drug Monitoring Program Reviewed:  Yes; no data displayed    Pregnancy Status   LMP: 8/8/23  Birth control/barriers: none, currently sexually active  Interested in birth control if none currently? No      No past medical history on file.      PAST PSYCHIATRIC HISTORY:  Diagnoses- none  Suicide Attempts: No   Hospitalizations: No         8/9/2023     1:00 PM   PHQ   PHQ-9 Total Score 11   Q9: Thoughts of better off dead/self-harm past 2 weeks Not at all         If PHQ-9 score of 15 or higher, has Recovery Clinic therapist or provider been notified? N/A    Any current suicidal ideation? No  If yes, has [facility] therapist or provider been notified? N/A    Mental health provider: none    Primary care provider: none    No past surgical history on file.    Medications:  No current outpatient medications on file prior to visit.  No current facility-administered " medications on file prior to visit.      No Known Allergies    Family History   Problem Relation Age of Onset    Substance Abuse Sister     Substance Abuse Sister          Social History  Housing status: with   Employment status: Unemployed, not seeking work  Relationship status:   Children: no children  Legal: none  Insurance needs: active  Contact information up to date? yes    3rd Party Involvement: declines     REVIEW OF SYSTEMS:  No withdrawal symptoms currently  No other concerns    OBJECTIVE                                                      BP 96/67   Pulse 65   LMP 08/08/2023 (Exact Date)   SpO2 100%     Physical Exam  Constitutional:       Appearance: Normal appearance.   HENT:      Nose: No rhinorrhea.   Eyes:      General: No scleral icterus.     Extraocular Movements: Extraocular movements intact.      Conjunctiva/sclera: Conjunctivae normal.   Pulmonary:      Effort: Pulmonary effort is normal.   Neurological:      Mental Status: She is alert and oriented to person, place, and time.      Motor: No tremor.      Gait: Gait is intact.   Psychiatric:         Attention and Perception: Attention and perception normal.         Mood and Affect: Mood normal. Affect is not inappropriate.         Speech: Speech normal.         Behavior: Behavior is cooperative.         Thought Content: Thought content normal.      Comments: Insight and judgement are fair         Labs:    UDS:   Lab Results   Component Value Date    BUP Negative 08/09/2023    BZO Negative 08/09/2023    BAR Negative 08/09/2023    NADEEN Negative 08/09/2023    MAMP Negative 08/09/2023    AMP Negative 08/09/2023    MDMA Negative 08/09/2023    MTD Negative 08/09/2023    [id number] Negative 08/09/2023    OXY Negative 08/09/2023    PCP Negative 08/09/2023    THC Screen Positive (A) 08/09/2023    TEMP 94 F 08/09/2023    SGPOCT 1.025 08/09/2023       *POC urine drug screen does not screen for Fentanyl    Recent Results (from the past 720  hour(s))   Drugs of Abuse Screen Urine (POC CUPS) POCT    Collection Time: 08/09/23  1:21 PM   Result Value Ref Range    POCT Kit Lot Number [id number]     POCT Kit Expiration Date 11/20/2024     Temperature Urine POCT 94 F 90 F, 92 F, 94 F, 96 F, 98 F, 100 F    Specific [place] POCT 1.025 1.005, 1.015, 1.025    pH Qual Urine POCT 5 pH 4 pH, 5 pH, 7 pH, 9 pH    Creatinine Qual Urine POCT 100 mg/dL 20 mg/dL, 50 mg/dL, 100 mg/dL, 200 mg/dL    Internal QC Qual Urine POCT Valid Valid    Amphetamine Qual Urine POCT Negative Negative    Barbiturate Qual Urine POCT Negative Negative    Buprenorphine Qual Urine POCT Negative Negative    Benzodiazepine Qual Urine POCT Negative Negative    Cocaine Qual Urine POCT Negative Negative    Methamphetamine Qual Urine POCT Negative Negative    MDMA Qual Urine POCT Negative Negative    Methadone Qual Urine POCT Negative Negative    Opiate Qual Urine POCT Negative Negative    Oxycodone Qual Urine POCT Negative Negative    Phencyclidine Qual Urine POCT Negative Negative    THC Qual Urine POCT Screen Positive (A) Negative   HCG qualitative urine    Collection Time: 08/09/23  4:03 PM   Result Value Ref Range    hCG Urine Qualitative Negative Negative   Fentanyl Qualitative with Reflex to Quant Urine    Collection Time: 08/09/23  4:03 PM   Result Value Ref Range    Fentanyl Qual Urine Screen Positive (A) Screen Negative       At least 60 min spent in review of medical record,  review, obtaining histories, discussing recommendations, counseling, providing support.      Paola Hurley MD  Addiction Medicine  [address] [id number] [phone]

## 2023-08-09 NOTE — TELEPHONE ENCOUNTER
Pt is interested in starting Sublocade.   Anticipate UDS +bupe at return visit.     - I am certified to treat addictions under DATA 2000 waiver, XDEA # [id number], though this is no longer required to prescribe buprenorphine for treatment of OUD.   - I have reviewed recommendations for comprehensive treatment plan with the patient  - I have reviewed the patient's medications comprehensively  and provided education to the patient on risks associated with concurrent use of benzodiazepines, alcohol, other sedatives with opioids  - I have recommended concomitant psychosocial support  - I have complied with all aspects of REMS program for Sublocade. [facility] where Sublocade will be administered is in compliance with all aspects of REMS program.   - Patient meets DSM-5 criteria for moderate or severe opioid use disorder  - Patient will have been prescribed buprenorphine 8-24mg/day for at least one 1 week at time of Sublocade, demonstrating tolerance of sublingual buprenorphine and control of withdrawal symptoms and cravings.   - Patient will discontinue sublingual buprenorphine when steady state achieved after starting Sublocade  - Patient does not have severe hepatic impairment.   - Patient does not have a history of long QT syndrome  - Patient does not take any antiarrhythmic medications or other medications known to significantly prolong QT interval   - Patient will not be receiving methadone while on Sublocade  - Patient will not be receiving any other long acting products for the treatment of opioid use disorder while on Sublocade

## 2023-08-10 ENCOUNTER — APPOINTMENT (OUTPATIENT)
Dept: LAB | Facility: CLINIC | Age: 23
End: 2023-08-10
Payer: COMMERCIAL

## 2023-08-10 LAB
C TRACH DNA SPEC QL NAA+PROBE: NEGATIVE
N GONORRHOEA DNA SPEC QL NAA+PROBE: NEGATIVE

## 2023-08-10 NOTE — TELEPHONE ENCOUNTER
Writer attempted to contact patient per provider directive to follow-up on progress of/symptoms during low dose buprenorphine start. The phone rang multiple times then went to a busy [facility]  [address], [address]   [place], [place], [address]  Phone: [phone]  Fax: [phone]    Open Monday-Friday  Closed over lunch hour  Walk in hours: 9am-11:30am and 12:30-3pm    Romy Pierce RN on 8/10/2023 at 10:49 AM

## 2023-08-11 NOTE — TELEPHONE ENCOUNTER
Writer attempted to contact patient per provider directive to follow-up on progress of/symptoms during low dose buprenorphine start. The phone rang multiple times then went to a busy signal.    Romy Pierce RN on 8/11/2023 at 2:57 PM

## 2023-08-14 ENCOUNTER — MYC MEDICAL ADVICE (OUTPATIENT)
Dept: BEHAVIORAL HEALTH | Facility: CLINIC | Age: 23
End: 2023-08-14
Payer: COMMERCIAL

## 2023-08-14 DIAGNOSIS — F11.93 OPIOID WITHDRAWAL (H): Primary | ICD-10-CM

## 2023-08-14 LAB
FENTANYL UR-MCNC: >1000 NG/ML
NORFENTANYL UR-MCNC: >1000 NG/ML

## 2023-08-14 NOTE — TELEPHONE ENCOUNTER
[facility].    Vangie Zavaleta RN on 8/14/2023 at 11:20 AM

## 2023-08-15 ENCOUNTER — LAB (OUTPATIENT)
Dept: LAB | Facility: CLINIC | Age: 23
End: 2023-08-15
Payer: COMMERCIAL

## 2023-08-15 ENCOUNTER — OFFICE VISIT (OUTPATIENT)
Dept: BEHAVIORAL HEALTH | Facility: CLINIC | Age: 23
End: 2023-08-15
Payer: COMMERCIAL

## 2023-08-15 VITALS — SYSTOLIC BLOOD PRESSURE: 116 MMHG | RESPIRATION RATE: 16 BRPM | HEART RATE: 73 BPM | DIASTOLIC BLOOD PRESSURE: 74 MMHG

## 2023-08-15 DIAGNOSIS — F11.20 OPIOID USE DISORDER, SEVERE, DEPENDENCE (H): ICD-10-CM

## 2023-08-15 DIAGNOSIS — Z11.3 SCREEN FOR STD (SEXUALLY TRANSMITTED DISEASE): ICD-10-CM

## 2023-08-15 LAB
ALBUMIN SERPL BCG-MCNC: 4.8 G/DL (ref 3.5–5.2)
ALP SERPL-CCNC: 90 U/L (ref 35–104)
ALT SERPL W P-5'-P-CCNC: 17 U/L (ref 0–50)
AMPHETAMINE QUAL URINE POCT: NEGATIVE
ANION GAP SERPL CALCULATED.3IONS-SCNC: 10 MMOL/L (ref 7–15)
AST SERPL W P-5'-P-CCNC: 28 U/L (ref 0–45)
BARBITURATE QUAL URINE POCT: NEGATIVE
BASOPHILS # BLD AUTO: 0 10E3/UL (ref 0–0.2)
BASOPHILS NFR BLD AUTO: 1 %
BENZODIAZEPINE QUAL URINE POCT: NEGATIVE
BILIRUB SERPL-MCNC: 0.3 MG/DL
BUN SERPL-MCNC: 14.1 MG/DL (ref 6–20)
BUPRENORPHINE QUAL URINE POCT: ABNORMAL
CALCIUM SERPL-MCNC: 9.9 MG/DL (ref 8.6–10)
CHLORIDE SERPL-SCNC: 101 MMOL/L (ref 98–107)
COCAINE QUAL URINE POCT: NEGATIVE
CREAT SERPL-MCNC: 0.55 MG/DL (ref 0.51–0.95)
CREATININE QUAL URINE POCT: ABNORMAL
DEPRECATED HCO3 PLAS-SCNC: 26 MMOL/L (ref 22–29)
EOSINOPHIL # BLD AUTO: 0 10E3/UL (ref 0–0.7)
EOSINOPHIL NFR BLD AUTO: 0 %
ERYTHROCYTE [DISTWIDTH] IN BLOOD BY AUTOMATED COUNT: 13.2 % (ref 10–15)
GFR SERPL CREATININE-BSD FRML MDRD: >90 ML/MIN/1.73M2
GLUCOSE SERPL-MCNC: 112 MG/DL (ref 70–99)
HBV CORE AB SERPL QL IA: NONREACTIVE
HBV SURFACE AB SERPL IA-ACNC: 2.6 M[IU]/ML
HBV SURFACE AB SERPL IA-ACNC: NONREACTIVE M[IU]/ML
HBV SURFACE AG SERPL QL IA: NONREACTIVE
HCT VFR BLD AUTO: 36.8 % (ref 35–47)
HCV AB SERPL QL IA: NONREACTIVE
HGB BLD-MCNC: 12 G/DL (ref 11.7–15.7)
IMM GRANULOCYTES # BLD: 0 10E3/UL
IMM GRANULOCYTES NFR BLD: 0 %
INTERNAL QC QUAL URINE POCT: ABNORMAL
LYMPHOCYTES # BLD AUTO: 1.2 10E3/UL (ref 0.8–5.3)
LYMPHOCYTES NFR BLD AUTO: 22 %
MCH RBC QN AUTO: 28.1 PG (ref 26.5–33)
MCHC RBC AUTO-ENTMCNC: 32.6 G/DL (ref 31.5–36.5)
MCV RBC AUTO: 86 FL (ref 78–100)
MDMA QUAL URINE POCT: NEGATIVE
METHADONE QUAL URINE POCT: NEGATIVE
METHAMPHETAMINE QUAL URINE POCT: NEGATIVE
MONOCYTES # BLD AUTO: 0.2 10E3/UL (ref 0–1.3)
MONOCYTES NFR BLD AUTO: 4 %
NEUTROPHILS # BLD AUTO: 4.2 10E3/UL (ref 1.6–8.3)
NEUTROPHILS NFR BLD AUTO: 73 %
NRBC # BLD AUTO: 0 10E3/UL
NRBC BLD AUTO-RTO: 0 /100
OPIATE QUAL URINE POCT: NEGATIVE
OXYCODONE QUAL URINE POCT: NEGATIVE
PH QUAL URINE POCT: ABNORMAL
PHENCYCLIDINE QUAL URINE POCT: NEGATIVE
PLATELET # BLD AUTO: 444 10E3/UL (ref 150–450)
POCT KIT EXPIRATION DATE: ABNORMAL
POCT KIT LOT NUMBER: ABNORMAL
POTASSIUM SERPL-SCNC: 3.8 MMOL/L (ref 3.4–5.3)
PROT SERPL-MCNC: 8.8 G/DL (ref 6.4–8.3)
RBC # BLD AUTO: 4.27 10E6/UL (ref 3.8–5.2)
SODIUM SERPL-SCNC: 137 MMOL/L (ref 136–145)
SPECIFIC GRAVITY POCT: 1.02
T PALLIDUM AB SER QL: NONREACTIVE
TEMPERATURE URINE POCT: ABNORMAL
THC QUAL URINE POCT: ABNORMAL
WBC # BLD AUTO: 5.7 10E3/UL (ref 4–11)

## 2023-08-15 PROCEDURE — 99214 OFFICE O/P EST MOD 30 MIN: CPT | Performed by: FAMILY MEDICINE

## 2023-08-15 PROCEDURE — 86780 TREPONEMA PALLIDUM: CPT | Performed by: FAMILY MEDICINE

## 2023-08-15 PROCEDURE — 82310 ASSAY OF CALCIUM: CPT

## 2023-08-15 PROCEDURE — 80305 DRUG TEST PRSMV DIR OPT OBS: CPT | Performed by: FAMILY MEDICINE

## 2023-08-15 PROCEDURE — 86803 HEPATITIS C AB TEST: CPT

## 2023-08-15 PROCEDURE — 86704 HEP B CORE ANTIBODY TOTAL: CPT

## 2023-08-15 PROCEDURE — 85004 AUTOMATED DIFF WBC COUNT: CPT

## 2023-08-15 PROCEDURE — 80053 COMPREHEN METABOLIC PANEL: CPT

## 2023-08-15 PROCEDURE — 87340 HEPATITIS B SURFACE AG IA: CPT

## 2023-08-15 PROCEDURE — 87389 HIV-1 AG W/HIV-1&-2 AB AG IA: CPT

## 2023-08-15 PROCEDURE — 86706 HEP B SURFACE ANTIBODY: CPT

## 2023-08-15 PROCEDURE — 36415 COLL VENOUS BLD VENIPUNCTURE: CPT

## 2023-08-15 RX ORDER — BUPRENORPHINE AND NALOXONE 8; 2 MG/1; MG/1
2 FILM, SOLUBLE BUCCAL; SUBLINGUAL DAILY
Qty: 30 FILM | Refills: 0 | Status: SHIPPED | OUTPATIENT
Start: 2023-08-15 | End: 2023-08-15

## 2023-08-15 RX ORDER — BUPRENORPHINE AND NALOXONE 8; 2 MG/1; MG/1
2 FILM, SOLUBLE BUCCAL; SUBLINGUAL DAILY
Qty: 30 FILM | Refills: 0 | Status: SHIPPED | OUTPATIENT
Start: 2023-08-15

## 2023-08-15 ASSESSMENT — PATIENT HEALTH QUESTIONNAIRE - PHQ9: SUM OF ALL RESPONSES TO PHQ QUESTIONS 1-9: 6

## 2023-08-15 ASSESSMENT — PAIN SCALES - GENERAL: PAINLEVEL: NO PAIN (0)

## 2023-08-15 NOTE — PROGRESS NOTES
[facility] Return Visit    ASSESSMENT/PLAN                                                    1. Opioid use disorder, severe, dependence (H)  Pt started low dose bupe initiation, did not increase past 1mg bid but did not stop buprenorphine.  Still wants to start Sublocade before leaving the country 8/25/23.   Recommend high dose start of buprenorphine, reviewed directions with pt.    Requesting PA for Sublocade  Ok to start Sublocade when approved and pt meets criteria.    Confirms she has naloxone.   - Drugs of Abuse Screen Urine (POC CUPS) POCT  - buprenorphine HCl-naloxone HCl (SUBOXONE) 8-2 MG per film; Place 2 Film under the tongue daily Or as directed  Dispense: 30 Film; Refill: 0       Return in about 1 week (around 8/22/2023) for Follow up, in person.    Patient counseling completed today:  Discussed mechanism of action, potential risks/benefits/side effects of medications and other recommendations above.    Discussed risk of precipitated withdrawal with initiation of buprenorphine in the presence of full opioid agonists.    Reviewed directions for initiation of buprenorphine to reduce risk of precipitated withdrawal and maximize efficacy.    Harm reduction counseling including never use alone, availability of naloxone, avoiding combination of opioids with benzodiazepines, alcohol, or other sedatives, safer administration.      Discussed importance of avoiding isolation, building a network of supportive relationships, avoiding people/places/things associated with past use to reduce risk of relapse; including motivational interviewing regarding psychosocial treatment for addiction.     SUBJECTIVE                                                      CC/HPI:  Camelia Corea is a 22 year old female with PMH nicotine use and opioid use disorder who presents to the Recovery Clinic for return visit.      Brief History:  Camelia Corea was first seen in Recovery Clinic on 08/09/23. They were  "referred by younger sister who has received services at the Recovery Clinic and is now in VIDAL treatment.   Pt is seeking help to stop use of fentanyl because of a planned trip to [place], leaving 8/25/23.  Pt would like to receive Sublocade injection before departing on her extended trip     Substance Use History :  Opioids:   Age at first use: 17  Current use: substance: Perc 30s (Fentanyl); quantity 30/day; route: snorts; timing of last use: 18/15/23;      IV drug use: No   History of overdose: Yes: age 17; revived with Narcan  Previous residential or outpatient treatments for addiction : No  Previous medication treatments for addiction: No  Longest period of sobriety: In June was able to go 8 hours without using while at work; stopped working in June  Medical complications related to substance use: none  Hepatitis C: 8/15/23 HCV ab pending  HIV: 8/15/23 HIV ag/ab pending    Other Addiction History:  Stimulants: none  Sedatives/hypnotics/anxiolytics:  none  Alcohol: none  Nicotine: vapes  Cannabis: yes, \"sometimes, here and there.\"   Hallucinogens/Dissociatives: none  Eating disorder: none  Gambling: none      A/P from most recent  visit 8/9/23  1. Opioid use disorder, severe, dependence (H)  21 yo female with 5 yr h/o [place] fentanyl use, last use today.  No withdrawal symptoms currently.  Recently told her mother about her use and wants help to stop use because of a planned trip to [place] in 2 weeks for yet to be determined duration.    Pt is interested in buprenorphine therapy, specifically wants to receive Sublocade injection before departing on her trip.    Reviewed high dose and low dose protocols for starting buprenorphine including potential risks and benefits.  Pt would like to proceed with low dose start.    Reviewed directions for buprenorphine titration starting 0.5mg day 1 and increase to 16mg by day 7.  Discontinue fentanyl use day 7.    Reviewed importance of never using alone, availability of " naloxone.   Follow-up by phone frequently during this process.  To facilitate PA process for Sublocade, pt stated she would return to clinic in a few days for UDS.   Follow-up MD visit in one week, reviewed walk in hours.    Start Sublocade when approved by insurance and pt meets criteria.   Encouraged psychosocial interventions and medical treatment for OUD when she returns from her extended stay in [place].    - naloxone (NARCAN) 4 MG/0.1ML nasal spray; Spray 1 spray (4 mg) into one nostril alternating nostrils as needed every 2-3 minutes until assistance arrives  Dispense: 0.2 mL; Refill: 11  - Drugs of Abuse Screen Urine (POC CUPS) POCT  - buprenorphine HCl-naloxone HCl (SUBOXONE) 2-0.5 MG per film; Take one twice a day, or as directed  Dispense: 20 Film; Refill: 0  - Fentanyl Qualitative with Reflex to Quant Urine; Future  - CBC with Platelets & Differential; Future  - COMPREHENSIVE METABOLIC PANEL; Future  - Fentanyl Qualitative with Reflex to Quant Urine  - Fentanyl and Metabolite Quantitative, Urine     2. Opioid withdrawal (H)  Reviewed goal of low dose initiation is to minimize withdrawal and should be adjusted accordingly.  Clonidine rx for prn use.   - cloNIDine (CATAPRES) 0.1 MG tablet; Take 1 tablet (0.1 mg) by mouth every 6 hours as needed (withdrawal symptoms including hot/cold sweats, anxiety, restlessness, sleeplessness)  Dispense: 20 tablet; Refill: 0     3. General counseling for prescription of oral contraceptives  Pt declined contraception  - HCG qualitative urine; Future  - HCG qualitative urine     4. Screen for STD (sexually transmitted disease)  Pt did not go to lab for blood draw, discuss again at next visit  - NEISSERIA GONORRHOEA PCR  - CHLAMYDIA TRACHOMATIS PCR  - Hepatitis B core antibody; Future  - Hepatitis B Surface Antibody; Future  - Hepatitis B surface antigen; Future  - Hepatitis C Screen Reflex to HCV RNA Quant and Genotype; Future  - HIV Antigen Antibody Combo Cascade;  Future  - Treponema Abs w Reflex to RPR and Titer; Future  - Treponema Abs w Reflex to RPR and Titer     5. Current nicotine use  Evaluate pt's goals next visit                Return in 6 days (on 8/15/2023) for Follow up, in person; arrive before 3p please; also nurse visit in a few days for urine drug screen.      8/15/23 visit:  Pt states she started low dose buprenorphine with 0.5mg on 8/9/23, took 0.5mg bid 8/10, then 1mg bid 8/11, 8/12.  No bupe taken on 8/13, 1mg bid taken 8/14 and 1mg so far today 8/15.  States she has the instructions provided at first visit but did not continue to reference them.  Has continued daily fentanyl use.    Still wants to start Sublocade.        [place] Prescription Drug Monitoring Program Reviewed:  Yes  08/09/2023 08/09/2023 1 Suboxone 2 Mg-0.5 Mg Sl Film 20.00 10 Li Vol [id number] Juanito ([id number]) 0/0 4.00 mg Medicaid [place]       No past medical history on file.      PAST PSYCHIATRIC HISTORY:  Diagnoses- none  Suicide Attempts: No   Hospitalizations: No         8/9/2023     1:00 PM 8/15/2023     2:00 PM   PHQ   PHQ-9 Total Score 11 6   Q9: Thoughts of better off dead/self-harm past 2 weeks Not at all Not at all     Mental health provider: none    Primary care provider: none    No past surgical history on file.    Medications:  buprenorphine HCl-naloxone HCl (SUBOXONE) 2-0.5 MG per film, Take one twice a day, or as directed  cloNIDine (CATAPRES) 0.1 MG tablet, Take 1 tablet (0.1 mg) by mouth every 6 hours as needed (withdrawal symptoms including hot/cold sweats, anxiety, restlessness, sleeplessness)  naloxone (NARCAN) 4 MG/0.1ML nasal spray, Spray 1 spray (4 mg) into one nostril alternating nostrils as needed every 2-3 minutes until assistance arrives    No current facility-administered medications on file prior to visit.      No Known Allergies    Family History   Problem Relation Age of Onset    Substance Abuse Sister     Substance Abuse Sister          Social History  Housing status:  with   Employment status: Unemployed, not seeking work  Relationship status:   Children: no children  Legal: none  Insurance needs: active  Contact information up to date? yes    3rd Party Involvement: declines     REVIEW OF SYSTEMS:  Denies significant withdrawal symptoms.     OBJECTIVE                                                      /74 (BP Location: Right arm, Patient Position: Sitting, Cuff Size: Adult Regular)   Pulse 73   Resp 16   LMP 08/08/2023 (Exact Date)     Physical Exam  Constitutional:       Appearance: Normal appearance.   HENT:      Nose: No rhinorrhea.   Eyes:      General: No scleral icterus.     Extraocular Movements: Extraocular movements intact.      Conjunctiva/sclera: Conjunctivae normal.   Pulmonary:      Effort: Pulmonary effort is normal.   Neurological:      Mental Status: She is alert and oriented to person, place, and time.      Motor: No tremor.      Gait: Gait is intact.   Psychiatric:         Attention and Perception: Attention and perception normal.         Mood and Affect: Mood normal. Affect is not inappropriate.         Speech: Speech normal.         Behavior: Behavior is cooperative.         Thought Content: Thought content normal.      Comments: Insight and judgement are fair         Labs:    UDS:   Lab Results   Component Value Date    BUP Screen Positive (A) 08/15/2023    BZO Negative 08/15/2023    BAR Negative 08/15/2023    NADEEN Negative 08/15/2023    MAMP Negative 08/15/2023    AMP Negative 08/15/2023    MDMA Negative 08/15/2023    MTD Negative 08/15/2023    [id number] Negative 08/15/2023    OXY Negative 08/15/2023    PCP Negative 08/15/2023    THC Screen Positive (A) 08/15/2023    TEMP 92 F 08/15/2023    SGPOCT 1.025 08/15/2023       *POC urine drug screen does not screen for Fentanyl    Recent Results (from the past 720 hour(s))   Drugs of Abuse Screen Urine (POC CUPS) POCT    Collection Time: 08/09/23  1:21 PM   Result Value Ref Range    POCT Kit  Lot Number [id number]     POCT Kit Expiration Date 11/20/2024     Temperature Urine POCT 94 F 90 F, 92 F, 94 F, 96 F, 98 F, 100 F    Specific [place] POCT 1.025 1.005, 1.015, 1.025    pH Qual Urine POCT 5 pH 4 pH, 5 pH, 7 pH, 9 pH    Creatinine Qual Urine POCT 100 mg/dL 20 mg/dL, 50 mg/dL, 100 mg/dL, 200 mg/dL    Internal QC Qual Urine POCT Valid Valid    Amphetamine Qual Urine POCT Negative Negative    Barbiturate Qual Urine POCT Negative Negative    Buprenorphine Qual Urine POCT Negative Negative    Benzodiazepine Qual Urine POCT Negative Negative    Cocaine Qual Urine POCT Negative Negative    Methamphetamine Qual Urine POCT Negative Negative    MDMA Qual Urine POCT Negative Negative    Methadone Qual Urine POCT Negative Negative    Opiate Qual Urine POCT Negative Negative    Oxycodone Qual Urine POCT Negative Negative    Phencyclidine Qual Urine POCT Negative Negative    THC Qual Urine POCT Screen Positive (A) Negative   NEISSERIA GONORRHOEA PCR    Collection Time: 08/09/23  4:03 PM    Specimen: Urine, Voided   Result Value Ref Range    Neisseria gonorrhoeae Negative Negative   CHLAMYDIA TRACHOMATIS PCR    Collection Time: 08/09/23  4:03 PM    Specimen: Urine, Voided   Result Value Ref Range    Chlamydia trachomatis Negative Negative   HCG qualitative urine    Collection Time: 08/09/23  4:03 PM   Result Value Ref Range    hCG Urine Qualitative Negative Negative   Fentanyl Qualitative with Reflex to Quant Urine    Collection Time: 08/09/23  4:03 PM   Result Value Ref Range    Fentanyl Qual Urine Screen Positive (A) Screen Negative   Fentanyl and Metabolite Quantitative, Urine    Collection Time: 08/09/23  4:03 PM   Result Value Ref Range    Fentanyl, Urn, Quant >1000.0 ng/mL    Norfentanyl, Urn, Quant >1000.0 ng/mL   Drugs of Abuse Screen Urine (POC CUPS) POCT    Collection Time: 08/15/23  2:31 PM   Result Value Ref Range    POCT Kit Lot Number [id number]     POCT Kit Expiration Date 2024-11-20     Temperature  Urine POCT 92 F 90 F, 92 F, 94 F, 96 F, 98 F, 100 F    Specific [place] POCT 1.025 1.005, 1.015, 1.025    pH Qual Urine POCT 5 pH 4 pH, 5 pH, 7 pH, 9 pH    Creatinine Qual Urine POCT 50 mg/dL 20 mg/dL, 50 mg/dL, 100 mg/dL, 200 mg/dL    Internal QC Qual Urine POCT Valid Valid    Amphetamine Qual Urine POCT Negative Negative    Barbiturate Qual Urine POCT Negative Negative    Buprenorphine Qual Urine POCT Screen Positive (A) Negative    Benzodiazepine Qual Urine POCT Negative Negative    Cocaine Qual Urine POCT Negative Negative    Methamphetamine Qual Urine POCT Negative Negative    MDMA Qual Urine POCT Negative Negative    Methadone Qual Urine POCT Negative Negative    Opiate Qual Urine POCT Negative Negative    Oxycodone Qual Urine POCT Negative Negative    Phencyclidine Qual Urine POCT Negative Negative    THC Qual Urine POCT Screen Positive (A) Negative   CBC with platelets and differential    Collection Time: 08/15/23  2:58 PM   Result Value Ref Range    WBC Count 5.7 4.0 - 11.0 10e3/uL    RBC Count 4.27 3.80 - 5.20 10e6/uL    Hemoglobin 12.0 11.7 - 15.7 g/dL    Hematocrit 36.8 35.0 - 47.0 %    MCV 86 78 - 100 fL    MCH 28.1 26.5 - 33.0 pg    MCHC 32.6 31.5 - 36.5 g/dL    RDW 13.2 10.0 - 15.0 %    Platelet Count 444 150 - 450 10e3/uL    % Neutrophils 73 %    % Lymphocytes 22 %    % Monocytes 4 %    % Eosinophils 0 %    % Basophils 1 %    % Immature Granulocytes 0 %    NRBCs per 100 WBC 0 <1 /100    Absolute Neutrophils 4.2 1.6 - 8.3 10e3/uL    Absolute Lymphocytes 1.2 0.8 - 5.3 10e3/uL    Absolute Monocytes 0.2 0.0 - 1.3 10e3/uL    Absolute Eosinophils 0.0 0.0 - 0.7 10e3/uL    Absolute Basophils 0.0 0.0 - 0.2 10e3/uL    Absolute Immature Granulocytes 0.0 <=0.4 10e3/uL    Absolute NRBCs 0.0 10e3/uL       At least 30 min spent in review of medical record,  review, obtaining histories, discussing recommendations, counseling, providing support.      Paola Hurley MD  Addiction Medicine  [facility]  [facility]  [address] [place] [address] [address] [place] [id number]  [phone]

## 2023-08-15 NOTE — NURSING NOTE
[facility]      Rooming information:  Approximate last use of full opioid agonist: Fentanyl last use 8/15/2023  Taking buprenorphine? Yes:   How much per day? 1/2 film BID  Number of buprenorphine films/tablets remaining currently: unsure maybe 7 films  Side effects related to buprenorphine (constipation, dry mouth, sedation?) Yes: vomiting, nausea, hot flashes    Narcan currently available: Yes  Other recent substance use:    Fentanyl  NICOTINE-Yes: Vaping  If using nicotine, ready to quit? No    Point of care urine drug screen positive for:  Lab Results   Component Value Date    BUP Screen Positive (A) 08/15/2023    BZO Negative 08/15/2023    BAR Negative 08/15/2023    NADEEN Negative 08/15/2023    MAMP Negative 08/15/2023    AMP Negative 08/15/2023    MDMA Negative 08/15/2023    MTD Negative 08/15/2023    [id number] Negative 08/15/2023    OXY Negative 08/15/2023    PCP Negative 08/15/2023    THC Screen Positive (A) 08/15/2023    TEMP 92 F 08/15/2023    SGPOCT 1.025 08/15/2023       *POC urine drug screen does not screen for Fentanyl    Pregnancy Status   LMP: 8/8/2023  Birth control/barriers: no  Interested in birth control if none currently? No  Urine pregnancy test specimen obtained and sent to lab:        8/9/2023     1:00 PM   PHQ Assesment Total Score(s)   PHQ-9 Score 11       If PHQ-9 score of 15 or higher, has [facility] therapist or provider been notified? No    Any current suicidal ideation? No  If yes, has [facility] therapist or provider been notified? N/A    Primary care provider: Physician No Ref-Primary     Mental health provider: Denies (follow up on MH referral if needed)    Insurance needs: active    Housing needs: stable    Contact information up to date? yes    3rd Party Involvement not at this itme (please obtain LINDA if pt would like to include)    Keira Zuñiga LPN  August 15, 2023  2:31 PM

## 2023-08-15 NOTE — PATIENT INSTRUCTIONS
Buprenorphine Self Start:    1) Take a day off and have a place to rest.  2) Stop using, and wait until you feel very sick from withdrawals  3) Dose two 8mg films or tablets under your tongue (first [id number])  4) Take another one or two 8mg films or tablets an hour later to feel well (can take these immediately after first dose if your symptoms get worse)  5) The next day, take 2-4 films or tablets (16-32mg) at once.    6) The next day, continue taking 2-3 films or tablets every day to control withdrawal symptoms and cravings.

## 2023-08-16 LAB — HIV 1+2 AB+HIV1 P24 AG SERPL QL IA: NONREACTIVE

## 2023-08-16 RX ORDER — TRAZODONE HYDROCHLORIDE 50 MG/1
50-100 TABLET, FILM COATED ORAL
Qty: 30 TABLET | Refills: 0 | Status: SHIPPED | OUTPATIENT
Start: 2023-08-16

## 2023-08-16 RX ORDER — GABAPENTIN 300 MG/1
300 CAPSULE ORAL EVERY 6 HOURS PRN
Qty: 20 CAPSULE | Refills: 0 | Status: SHIPPED | OUTPATIENT
Start: 2023-08-16

## 2023-08-16 NOTE — TELEPHONE ENCOUNTER
Incoming [employer] message from patient reporting use of Buprenorphine 16-4 mg which induced precipitated withdrawal as patients last Fentanyl reported was yesterday 08/15/2023. It appears patient did not wait to start high dose buprenorphine start as instructed by provider. Writer responded to patient via [employer] message inquiring how she is doing, where is she at in the process and sent the instructions provided by the provider on the AVS.     Buprenorphine Self Start:     1) Take a day off and have a place to rest.  2) Stop using, and wait until you feel very sick from withdrawals  3) Dose two 8mg films or tablets under your tongue (first [id number])  4) Take another one or two 8mg films or tablets an hour later to feel well (can take these immediately after first dose if your symptoms get worse)  5) The next day, take 2-4 films or tablets (16-32mg) at once.    6) The next day, continue taking 2-3 films or tablets every day to control withdrawal symptoms and cravings.      Romy Pierce RN on 8/16/2023 at 9:22 AM    Incoming call from patient who reports her last use of Fentanyl was 12:00 pm on 08/15/2023, patient denies receiving AVS with high dose buprenorphine stat instructions on them. Patient started buprenorphine 16-4 mg at 8:30 pm on 08/15/2023 and started precipitated withdrawal 30 minutes later patient took another 16-4 mg buprenorphine. Patient reported severe withdrawal symptoms gastritis, abdominal pain, diarrhea, diaphoresis, agitation, unsteady on her feet, unable to remain still, and shaky; patient did take 0.1 mg Clonidine PO.  At 10:00  pm patient called the paramedics who took patient to [facility], patient reported they did not do anything for her, she was placed in a room with another individual and felt like she was in [facility]. Patient left the hospital at 01:00 am and went home.     Writer spoke with patient at 09:26 am today 08/16/2023 who reported precipitated withdrawal symptoms have lessened,  however still present and uncomfortable. Writer recommended patient continue Clonidine as prescribed as needed. Patient is requesting medication to help with sleep as she has been unable to. Patient is looking for further direction for today. Writer provided empathy to patient for having to overcome this situation as well as commended patient for doing so. Writer advised patient that the encounter will be routed to provider and writer will follow up with patient ASAP with further direction.     Romy Pierce RN on 8/16/2023 at 10:16 AM

## 2023-08-16 NOTE — TELEPHONE ENCOUNTER
Writer spoke with patient and provided the recommendations per provider to resume buprenorphine 16-24mg/day starting tomorrow morning 8/17/23 as well as scheduling Sublocade on 08/22/23 after appointment in the Recovery Clinic.     Romy Pierce RN on 8/16/2023 at 12:36 PM

## 2023-08-16 NOTE — TELEPHONE ENCOUNTER
She should continue to take at least 16mg buprenorphine every day (2 films) and can increase to 24mg/day if still experiencing withdrawal.   She will be able to get Sublocade before she leaves next week.       She can use clonidine 0.1mg every 6 hours as needed for withdrawal until symptoms are stabilized on buprenorphine.      She can add gabapentin 300mg every 6 hours as needed for withdrawal also.     I will also send rx for trazodone at night for sleep if needed.       1. Opioid withdrawal (H)    - gabapentin (NEURONTIN) 300 MG capsule; Take 1 capsule (300 mg) by mouth every 6 hours as needed (withdrawal symptoms including anxiety, restlessness, sleeplessness)  Dispense: 20 capsule; Refill: 0  - traZODone (DESYREL) 50 MG tablet; Take 1-2 tablets ( mg) by mouth nightly as needed for sleep  Dispense: 30 tablet; Refill: 0

## 2023-08-16 NOTE — TELEPHONE ENCOUNTER
Writer spoke with patient who reported taking Clonidine 0.1 mg which has helped with her withdrawal symptoms. Patient was hesitant in taking an additional 16-4 mg of Suboxone when advised to. Writer asked patient if her last use of Fentanyl was truly yesterday? Patient reported using 1 Perc 30 tablet at 04:00 am 08/16/2023. Writer recommended patient hold Suboxone and wait for further instruction from the provider. Writer notified patient of the medications that were sent to the pharmacy (Gabapentin and Trazodone). Patient conveyed understanding and reports she will not use any further fentanyl.     Romy Pierce RN on 8/16/2023 at 11:47 AM

## 2023-08-16 NOTE — TELEPHONE ENCOUNTER
Writer informed patient via [employer] message that Sublocade prior authorization was approved. Provided scheduling # for [facility], [phone]. Reiterated to patient the importance of taking buprenorphine as prescribed without opiate use for at least 7 days leading up to Sublocade injection.     Romy Pierce RN on 8/16/2023 at 8:44 AM

## 2023-08-16 NOTE — TELEPHONE ENCOUNTER
If she did take 32mg of buprenorphine yesterday, she can resume buprenorphine 16-24mg/day starting tomorrow morning 8/17/23.      I also recommend she schedule Sublocade.  She is currently scheduled to return to  Tues 8/22/23 at 2:30p.  Scheduling sublocade after this visit would be great.

## 2023-08-17 NOTE — TELEPHONE ENCOUNTER
RN attempted to call patient back. There was no answer. Patient then called clinic back but hung up. RN called back but no answer. Patient then called back and RN was able to connect with patient who reports the following:    She has not taken any Suboxone today. She states she feels good and is in no withdrawal. She is nervous about taking it related to precipitated withdrawal reaction.  She confirmed with RN that last use date is 8/15/2023 and there has bee no use since then.  She last took clonidine last evening and this helped.     RN reassured her that she can take 16 mg of buprenorphine and that she needs to continue medication to be able to receive her Sublocade injection next week. RN suggested she take a clonidine before her Suboxone dose and take 8 mg then another 8 mg 30 minutes later if she prefers. RN instructed her to take 16 mg daily until her Sublocade injection with the option to take up to 24 mg if needed. Patient conveyed good understanding.     RN also talked with patient regarding Sublocade scheduling. She states she would like to schedule. Her next appointment in the  is on 8/22/2023 at 2:30 pm. Camelia states she can come in at any time that day as it is important.     RN called infusion center and scheduled patient in the 0930 slot. They had no afternoon appointment. They placed a message in appointment notes that this injection is flexible and she may present to the infusion center after her 2:30 appointment as long as she is there by 3:30. RN update [facility] appointment notes as well and sent update to patient via [employer].     Patient agrees to call or send a message back to [facility] if she has further questions or concerns.     Routing update to provider.     Liat Parekh RN on 8/17/2023 at 2:33 PM

## 2023-08-18 NOTE — TELEPHONE ENCOUNTER
RN spoke directly with Dr. Hurley. She requested the patient be updated to the following:    Continue taking Suboxone daily.  The symptoms she is feeling after dosing sound like withdrawal and it anh benefit the patient to take the full 16 mg per day.  She may take the 8 mg twice daily if desired especially to help with any cravings.     RN called patient and conveyed the above. Patient denies any cravings and will think about taking the 8 mg this evening but will likely prefer to take another 8 mg in the morning.     RN review follow-up plan including being seen in the Recovery Clinic prior to her scheduled Sublocade injection.    Liat Parekh RN on 8/18/2023 at 4:59 PM

## 2023-08-18 NOTE — TELEPHONE ENCOUNTER
RN received notification that patient is on the phone asking to speak with a nurse.     RN spoke with the patient and she reports the following:    Despite talking with the nurse yesterday, she did not take any Suboxone. She states all she took was trazodone because she needed help to sleep.  This morning, patient is at work and took one 8 mg film at around 0600. She states since taking this she had symptoms of loose stool x 1, stomach feels queasy, drowsiness, feeling cold and nose running. She states she took a clonidine and is at work.  She expressed concerns about being able to continue the Suboxone at the prescribed 16 mg and wonders if she really needs the injection.   She plans to leave the country on 8/25/2023.    RN praised her efforts to take her dose and let patient know her thought about Sublocade would be sent to the provider for review. RN did encourage patient that it should get easier to continue to take her Suboxone.     Patient has a scheduled Sublocade on 8/22/2023.     Routing to provider for review. Patient seems to nervous to take more than 16 mg. Patient currently taking buprenorphine HCl-naloxone HCl (SUBOXONE) 8-2 MG per film formulation.    RN will call patient back with any further instructions or send a MyChart if not available.     Liat Parekh RN on 8/18/2023 at 9:53 AM

## 2023-08-22 ENCOUNTER — INFUSION THERAPY VISIT (OUTPATIENT)
Dept: INFUSION THERAPY | Facility: CLINIC | Age: 23
End: 2023-08-22
Attending: FAMILY MEDICINE
Payer: COMMERCIAL

## 2023-08-22 ENCOUNTER — OFFICE VISIT (OUTPATIENT)
Dept: BEHAVIORAL HEALTH | Facility: CLINIC | Age: 23
End: 2023-08-22
Payer: COMMERCIAL

## 2023-08-22 VITALS — DIASTOLIC BLOOD PRESSURE: 69 MMHG | RESPIRATION RATE: 16 BRPM | SYSTOLIC BLOOD PRESSURE: 104 MMHG | HEART RATE: 60 BPM

## 2023-08-22 DIAGNOSIS — F11.20 OPIOID USE DISORDER, SEVERE, DEPENDENCE (H): Primary | ICD-10-CM

## 2023-08-22 DIAGNOSIS — F11.20 OPIOID USE DISORDER, SEVERE, DEPENDENCE (H): ICD-10-CM

## 2023-08-22 LAB
AMPHETAMINE QUAL URINE POCT: NEGATIVE
BARBITURATE QUAL URINE POCT: NEGATIVE
BENZODIAZEPINE QUAL URINE POCT: NEGATIVE
BUPRENORPHINE QUAL URINE POCT: ABNORMAL
COCAINE QUAL URINE POCT: NEGATIVE
CREATININE QUAL URINE POCT: ABNORMAL
INTERNAL QC QUAL URINE POCT: ABNORMAL
MDMA QUAL URINE POCT: NEGATIVE
METHADONE QUAL URINE POCT: NEGATIVE
METHAMPHETAMINE QUAL URINE POCT: NEGATIVE
OPIATE QUAL URINE POCT: NEGATIVE
OXYCODONE QUAL URINE POCT: NEGATIVE
PH QUAL URINE POCT: ABNORMAL
PHENCYCLIDINE QUAL URINE POCT: NEGATIVE
POCT KIT EXPIRATION DATE: ABNORMAL
POCT KIT LOT NUMBER: ABNORMAL
SPECIFIC GRAVITY POCT: 1.02
TEMPERATURE URINE POCT: ABNORMAL
THC QUAL URINE POCT: ABNORMAL

## 2023-08-22 PROCEDURE — 250N000009 HC RX 250: Performed by: FAMILY MEDICINE

## 2023-08-22 PROCEDURE — 99214 OFFICE O/P EST MOD 30 MIN: CPT | Performed by: FAMILY MEDICINE

## 2023-08-22 PROCEDURE — 250N000011 HC RX IP 250 OP 636: Mod: JZ | Performed by: FAMILY MEDICINE

## 2023-08-22 PROCEDURE — 80305 DRUG TEST PRSMV DIR OPT OBS: CPT | Performed by: FAMILY MEDICINE

## 2023-08-22 PROCEDURE — 96372 THER/PROPH/DIAG INJ SC/IM: CPT | Performed by: FAMILY MEDICINE

## 2023-08-22 RX ORDER — MEPERIDINE HYDROCHLORIDE 25 MG/ML
25 INJECTION INTRAMUSCULAR; INTRAVENOUS; SUBCUTANEOUS EVERY 30 MIN PRN
Status: CANCELLED | OUTPATIENT
Start: 2023-09-19

## 2023-08-22 RX ORDER — METHYLPREDNISOLONE SODIUM SUCCINATE 125 MG/2ML
125 INJECTION, POWDER, LYOPHILIZED, FOR SOLUTION INTRAMUSCULAR; INTRAVENOUS
Status: CANCELLED
Start: 2023-09-19

## 2023-08-22 RX ORDER — ALBUTEROL SULFATE 90 UG/1
1-2 AEROSOL, METERED RESPIRATORY (INHALATION)
Status: CANCELLED
Start: 2023-09-19

## 2023-08-22 RX ORDER — ALBUTEROL SULFATE 0.83 MG/ML
2.5 SOLUTION RESPIRATORY (INHALATION)
Status: CANCELLED | OUTPATIENT
Start: 2023-09-19

## 2023-08-22 RX ORDER — DIPHENHYDRAMINE HYDROCHLORIDE 50 MG/ML
50 INJECTION INTRAMUSCULAR; INTRAVENOUS
Status: CANCELLED
Start: 2023-09-19

## 2023-08-22 RX ORDER — LIDOCAINE HYDROCHLORIDE 10 MG/ML
2 INJECTION, SOLUTION EPIDURAL; INFILTRATION; INTRACAUDAL; PERINEURAL ONCE
Status: CANCELLED | OUTPATIENT
Start: 2023-09-19 | End: 2023-09-19

## 2023-08-22 RX ORDER — EPINEPHRINE 1 MG/ML
0.3 INJECTION, SOLUTION, CONCENTRATE INTRAVENOUS EVERY 5 MIN PRN
Status: CANCELLED | OUTPATIENT
Start: 2023-09-19

## 2023-08-22 RX ORDER — LIDOCAINE HYDROCHLORIDE 10 MG/ML
2 INJECTION, SOLUTION EPIDURAL; INFILTRATION; INTRACAUDAL; PERINEURAL ONCE
Status: COMPLETED | OUTPATIENT
Start: 2023-08-22 | End: 2023-08-22

## 2023-08-22 RX ADMIN — LIDOCAINE HYDROCHLORIDE 2 ML: 10 INJECTION, SOLUTION EPIDURAL; INFILTRATION; INTRACAUDAL; PERINEURAL at 11:33

## 2023-08-22 RX ADMIN — BUPRENORPHINE 300 MG: 300 SOLUTION SUBCUTANEOUS at 11:38

## 2023-08-22 ASSESSMENT — PATIENT HEALTH QUESTIONNAIRE - PHQ9: SUM OF ALL RESPONSES TO PHQ QUESTIONS 1-9: 7

## 2023-08-22 ASSESSMENT — PAIN SCALES - GENERAL
PAINLEVEL: NO PAIN (0)
PAINLEVEL: NO PAIN (0)

## 2023-08-22 NOTE — PROGRESS NOTES
Infusion Nursing Note:  Camelia Corea presents today for sublocade injection.    Patient seen by provider today: Yes, seen in recovery clinic prior to injection.   present during visit today: Not Applicable.    Note: patient provided with written education including and wallet card. Patient confirms has been taking suboxone strips for at least 7 days.Patient to be leaving out of the country in a few days.      Intravenous Access:  No Intravenous access/labs at this visit.    Treatment Conditions:  Patient states last relapse was 7 days ago. Confirms she saw her  provider prior to injection, was told to come up to receive injection.      Post Infusion Assessment:  Patient tolerated injection without incident.  No evidence of extravasations.       Discharge Plan:   Discharge instructions reviewed with: Patient.  Patient and/or family verbalized understanding of discharge instructions and all questions answered.  Patient discharged in stable condition accompanied by: self.  Departure Mode: Ambulatory.      Mackenzie Menezes RN

## 2023-08-22 NOTE — NURSING NOTE
[facility]      Rooming information:  Approximate last use of full opioid agonist: denies any use since last appointment  Taking buprenorphine? Yes:   How much per day? 1 film per day  Number of buprenorphine films/tablets remaining currently: 15 maybe more  Side effects related to buprenorphine (constipation, dry mouth, sedation?) Yes: vomited after taking films the last two days   Narcan currently available: Yes  Other recent substance use:   Cannabis   NICOTINE-Yes: Vaping  If using nicotine, ready to quit? No    Point of care urine drug screen positive for:  Lab Results   Component Value Date    BUP Screen Positive (A) 08/22/2023    BZO Negative 08/22/2023    BAR Negative 08/22/2023    NADEEN Negative 08/22/2023    MAMP Negative 08/22/2023    AMP Negative 08/22/2023    MDMA Negative 08/22/2023    MTD Negative 08/22/2023    [id number] Negative 08/22/2023    OXY Negative 08/22/2023    PCP Negative 08/22/2023    THC Screen Positive (A) 08/22/2023    TEMP 90 F 08/22/2023    SGPOCT 1.025 08/22/2023       *POC urine drug screen does not screen for Fentanyl    Pregnancy Status   LMP: 8/11/2023  Birth control/barriers: NA  Interested in birth control if none currently? No  Urine pregnancy test specimen obtained and sent to lab:NA        8/22/2023    10:00 AM   PHQ Assesment Total Score(s)   PHQ-9 Score 7       If PHQ-9 score of 15 or higher, has [facility] therapist or provider been notified? No    Any current suicidal ideation? No  If yes, has [facility] therapist or provider been notified? N/A    Primary care provider: Physician No Ref-Primary     Mental health provider: denies (follow up on MH referral if needed)    Insurance needs: active    Housing needs: stable    Contact information up to date? yes    3rd Party Involvement not at this time (please obtain LINDA if pt would like to include)    Keira Zuñiga LPN  August 22, 2023  10:48 AM

## 2023-10-07 ENCOUNTER — HEALTH MAINTENANCE LETTER (OUTPATIENT)
Age: 23
End: 2023-10-07

## 2023-10-15 ENCOUNTER — HOSPITAL ENCOUNTER (EMERGENCY)
Facility: CLINIC | Age: 23
Discharge: HOME | End: 2023-10-15
Attending: EMERGENCY MEDICINE | Admitting: EMERGENCY MEDICINE
Payer: COMMERCIAL

## 2023-10-15 VITALS
TEMPERATURE: 98.3 F | HEART RATE: 86 BPM | SYSTOLIC BLOOD PRESSURE: 126 MMHG | DIASTOLIC BLOOD PRESSURE: 82 MMHG | OXYGEN SATURATION: 100 % | RESPIRATION RATE: 17 BRPM

## 2023-10-15 DIAGNOSIS — N30.01 ACUTE CYSTITIS WITH HEMATURIA: ICD-10-CM

## 2023-10-15 LAB
ALBUMIN UR-MCNC: 100 MG/DL
APPEARANCE UR: ABNORMAL
BACTERIA #/AREA URNS HPF: ABNORMAL /HPF
BILIRUB UR QL STRIP: NEGATIVE
COLOR UR AUTO: YELLOW
GLUCOSE UR STRIP-MCNC: NEGATIVE MG/DL
HCG UR QL: NEGATIVE
HGB UR QL STRIP: ABNORMAL
KETONES UR STRIP-MCNC: ABNORMAL MG/DL
LEUKOCYTE ESTERASE UR QL STRIP: ABNORMAL
MUCOUS THREADS #/AREA URNS LPF: PRESENT /LPF
NITRATE UR QL: NEGATIVE
PH UR STRIP: 5.5 [PH] (ref 5–7)
RBC URINE: 18 /HPF
SP GR UR STRIP: 1.04 (ref 1–1.03)
SQUAMOUS EPITHELIAL: 2 /HPF
TRANSITIONAL EPI: <1 /HPF
UROBILINOGEN UR STRIP-MCNC: NORMAL MG/DL
WBC URINE: 115 /HPF

## 2023-10-15 PROCEDURE — 87086 URINE CULTURE/COLONY COUNT: CPT | Performed by: EMERGENCY MEDICINE

## 2023-10-15 PROCEDURE — 99283 EMERGENCY DEPT VISIT LOW MDM: CPT | Performed by: EMERGENCY MEDICINE

## 2023-10-15 PROCEDURE — 81025 URINE PREGNANCY TEST: CPT | Performed by: EMERGENCY MEDICINE

## 2023-10-15 PROCEDURE — 81001 URINALYSIS AUTO W/SCOPE: CPT | Performed by: EMERGENCY MEDICINE

## 2023-10-15 PROCEDURE — 99284 EMERGENCY DEPT VISIT MOD MDM: CPT | Performed by: EMERGENCY MEDICINE

## 2023-10-15 RX ORDER — SULFAMETHOXAZOLE/TRIMETHOPRIM 800-160 MG
1 TABLET ORAL 2 TIMES DAILY
Qty: 10 TABLET | Refills: 0 | Status: SHIPPED | OUTPATIENT
Start: 2023-10-15 | End: 2023-10-20

## 2023-10-15 ASSESSMENT — ACTIVITIES OF DAILY LIVING (ADL)
ADLS_ACUITY_SCORE: 33
ADLS_ACUITY_SCORE: 33

## 2023-10-15 NOTE — ED TRIAGE NOTES
Patient presents to triage for evaluation of possible UTI. Patient has been having pain with urination, frequency for a few days. Denies blood in urine.     Triage Assessment (Adult)       Row Name 10/15/23 1435          Triage Assessment    Airway WDL WDL        Respiratory WDL    Respiratory WDL WDL        Skin Circulation/Temperature WDL    Skin Circulation/Temperature WDL WDL        Cardiac WDL    Cardiac WDL WDL        Peripheral/Neurovascular WDL    Peripheral Neurovascular WDL WDL        Cognitive/Neuro/Behavioral WDL    Cognitive/Neuro/Behavioral WDL WDL

## 2023-10-15 NOTE — ED PROVIDER NOTES
[place] EMERGENCY DEPARTMENT ([facility])    10/15/23       ED PROVIDER NOTE  VTA      History     Chief Complaint   Patient presents with    Dysuria     HPI  Camelia Corea is a 22 year old female with a past medical history significant for opioid use disorder who presents to the Emergency Department for evaluation of dysuria. Patient states she has been having urinary urgency and pain with urination as well. She states she has noticed her vagina is swollen and inflamed. She has not had a bladder or kidney infection in the past. She denies feeling abdominal, back or flank pain. She denies chest pain or shortness of breath. She has had no fevers or chills. She has no vaginal bleeding or unusual vaginal discharge. She has no concerns for STIs. She is in a monogamous relationship. Her last period was 2 weeks ago. Patient is sexually active and she is not on birth control. She has no allergies and no chronic medical problems.       Past Medical History  History reviewed. No pertinent past medical history.  History reviewed. No pertinent surgical history.  sulfamethoxazole-trimethoprim (BACTRIM DS) 800-160 MG tablet  sulfamethoxazole-trimethoprim (BACTRIM DS) 800-160 MG tablet  buprenorphine HCl-naloxone HCl (SUBOXONE) 8-2 MG per film  cloNIDine (CATAPRES) 0.1 MG tablet  gabapentin (NEURONTIN) 300 MG capsule  naloxone (NARCAN) 4 MG/0.1ML nasal spray  traZODone (DESYREL) 50 MG tablet      No Known Allergies  Family History  Family History   Problem Relation Age of Onset    Substance Abuse Sister     Substance Abuse Sister      Social History   Social History     Tobacco Use    Smoking status: Every Day     Types: Vaping Device    Smokeless tobacco: Never   Substance Use Topics    Alcohol use: No    Drug use: Yes     Types: Marijuana, Fentanyl     Comment: Last used 8/15/2023      Past medical history, past surgical history, medications, allergies, family history, and social history were reviewed with the  patient. No additional pertinent items.      A complete review of systems was performed with pertinent positives and negatives noted in the HPI, and all other systems negative.    Physical Exam   BP: 126/82  Pulse: 86  Temp: 98.3  F (36.8  C)  Resp: 17  SpO2: 100 %  Physical Exam  Vitals and nursing note reviewed.   Constitutional:       General: She is not in acute distress.     Appearance: Normal appearance. She is not diaphoretic.   HENT:      Head: Atraumatic.      Mouth/Throat:      Mouth: Mucous membranes are moist.   Eyes:      General: No scleral icterus.     Conjunctiva/sclera: Conjunctivae normal.   Cardiovascular:      Rate and Rhythm: Normal rate and regular rhythm.      Heart sounds: Normal heart sounds.   Pulmonary:      Effort: No respiratory distress.      Breath sounds: Normal breath sounds.   Abdominal:      General: Abdomen is flat.   Genitourinary:     Comments: Pt declined vaginal exam.  Musculoskeletal:         General: Normal range of motion.      Cervical back: Neck supple.   Skin:     General: Skin is warm.      Findings: No rash.   Neurological:      General: No focal deficit present.      Mental Status: She is alert and oriented to person, place, and time.      Motor: No weakness.   Psychiatric:         Mood and Affect: Mood normal.         Behavior: Behavior normal.       ED Course, Procedures, & Data      Procedures             Results for orders placed or performed during the hospital encounter of 10/15/23   UA with Microscopic reflex to Culture     Status: Abnormal    Specimen: Urine, Clean Catch   Result Value Ref Range    Color Urine Yellow Colorless, Straw, Light Yellow, Yellow    Appearance Urine Slightly Cloudy (A) Clear    Glucose Urine Negative Negative mg/dL    Bilirubin Urine Negative Negative    Ketones Urine Trace (A) Negative mg/dL    Specific Gravity Urine 1.038 (H) 1.003 - 1.035    Blood Urine Small (A) Negative    pH Urine 5.5 5.0 - 7.0    Protein Albumin Urine 100 (A)  Negative mg/dL    Urobilinogen Urine Normal Normal, 2.0 mg/dL    Nitrite Urine Negative Negative    Leukocyte Esterase Urine Large (A) Negative    Bacteria Urine Few (A) None Seen /HPF    Mucus Urine Present (A) None Seen /LPF    RBC Urine 18 (H) <=2 /HPF    WBC Urine 115 (H) <=5 /HPF    Squamous Epithelials Urine 2 (H) <=1 /HPF    Transitional Epithelials Urine <1 <=1 /HPF    Narrative    Urine Culture ordered based on laboratory criteria   HCG qualitative urine (UPT)     Status: Normal   Result Value Ref Range    hCG Urine Qualitative Negative Negative     Medications - No data to display  Labs Ordered and Resulted from Time of ED Arrival to Time of ED Departure   ROUTINE UA WITH MICROSCOPIC REFLEX TO CULTURE - Abnormal       Result Value    Color Urine Yellow      Appearance Urine Slightly Cloudy (*)     Glucose Urine Negative      Bilirubin Urine Negative      Ketones Urine Trace (*)     Specific Gravity Urine 1.038 (*)     Blood Urine Small (*)     pH Urine 5.5      Protein Albumin Urine 100 (*)     Urobilinogen Urine Normal      Nitrite Urine Negative      Leukocyte Esterase Urine Large (*)     Bacteria Urine Few (*)     Mucus Urine Present (*)     RBC Urine 18 (*)     WBC Urine 115 (*)     Squamous Epithelials Urine 2 (*)     Transitional Epithelials Urine <1     HCG QUALITATIVE URINE - Normal    hCG Urine Qualitative Negative     URINE CULTURE     No orders to display        Critical care was not performed.     Medical Decision Making  The patient's presentation was of low complexity (an acute and uncomplicated illness or injury).    The patient's evaluation involved:  ordering and/or review of 3+ test(s) in this encounter (see separate area of note for details)    The patient's management necessitated moderate risk (prescription drug management including medications given in the ED).    Assessment & Plan    21 yo female here with dysuria and urinary frequency. She denies vaginal bleeding or discharge. She  has no concern for STI. Urinary and pregnancy testing ordered.    Labs show urine consistent with UTI. She will be discharged to home with bactrim and follow up with her PCP in the next week.    I have reviewed the nursing notes. I have reviewed the findings, diagnosis, plan and need for follow up with the patient.    Discharge Medication List as of 10/15/2023  5:04 PM        START taking these medications    Details   sulfamethoxazole-trimethoprim (BACTRIM DS) 800-160 MG tablet Take 1 tablet by mouth 2 times daily for 5 days, Disp-10 tablet, R-0, Local Print             Final diagnoses:   Acute cystitis with hematuria   IABY, am serving as a trained medical scribe to document services personally performed by Antonio Leon MD, based on the provider's statements to me.      I, Antonio Leon MD, was physically present and have reviewed and verified the accuracy of this note documented by ABY BAE.     Antonio Leon MD  [facility] EMERGENCY DEPARTMENT  10/15/2023     Antonio Leon MD  10/15/23 [id number]

## 2023-10-17 LAB — BACTERIA UR CULT: NORMAL

## 2024-01-16 ENCOUNTER — PRE VISIT (OUTPATIENT)
Dept: UROLOGY | Facility: CLINIC | Age: 24
End: 2024-01-16

## 2024-09-25 ENCOUNTER — HOSPITAL ENCOUNTER (EMERGENCY)
Facility: CLINIC | Age: 24
Discharge: HOME | End: 2024-09-26
Attending: EMERGENCY MEDICINE | Admitting: EMERGENCY MEDICINE

## 2024-09-25 ENCOUNTER — APPOINTMENT (OUTPATIENT)
Dept: GENERAL RADIOLOGY | Facility: CLINIC | Age: 24
End: 2024-09-25
Attending: EMERGENCY MEDICINE

## 2024-09-25 ENCOUNTER — APPOINTMENT (OUTPATIENT)
Dept: CT IMAGING | Facility: CLINIC | Age: 24
End: 2024-09-25
Attending: EMERGENCY MEDICINE

## 2024-09-25 DIAGNOSIS — R50.9 FEVER IN ADULT: ICD-10-CM

## 2024-09-25 DIAGNOSIS — N39.0 ACUTE LOWER UTI: ICD-10-CM

## 2024-09-25 DIAGNOSIS — R07.9 CHEST PAIN, UNSPECIFIED TYPE: ICD-10-CM

## 2024-09-25 DIAGNOSIS — R10.30 LOWER ABDOMINAL PAIN: ICD-10-CM

## 2024-09-25 LAB
ALBUMIN SERPL BCG-MCNC: 3.9 G/DL (ref 3.5–5.2)
ALP SERPL-CCNC: 76 U/L (ref 40–150)
ALT SERPL W P-5'-P-CCNC: 25 U/L (ref 0–50)
ANION GAP SERPL CALCULATED.3IONS-SCNC: 11 MMOL/L (ref 7–15)
AST SERPL W P-5'-P-CCNC: 60 U/L (ref 0–45)
BASOPHILS # BLD AUTO: 0 10E3/UL (ref 0–0.2)
BASOPHILS NFR BLD AUTO: 0 %
BILIRUB SERPL-MCNC: 0.3 MG/DL
BUN SERPL-MCNC: 22.4 MG/DL (ref 6–20)
CALCIUM SERPL-MCNC: 8.8 MG/DL (ref 8.8–10.4)
CHLORIDE SERPL-SCNC: 102 MMOL/L (ref 98–107)
CREAT SERPL-MCNC: 0.62 MG/DL (ref 0.51–0.95)
EGFRCR SERPLBLD CKD-EPI 2021: >90 ML/MIN/1.73M2
EOSINOPHIL # BLD AUTO: 0.1 10E3/UL (ref 0–0.7)
EOSINOPHIL NFR BLD AUTO: 1 %
ERYTHROCYTE [DISTWIDTH] IN BLOOD BY AUTOMATED COUNT: 23.9 % (ref 10–15)
GLUCOSE SERPL-MCNC: 115 MG/DL (ref 70–99)
HCG SERPL QL: NEGATIVE
HCO3 SERPL-SCNC: 22 MMOL/L (ref 22–29)
HCT VFR BLD AUTO: 29.9 % (ref 35–47)
HGB BLD-MCNC: 9.4 G/DL (ref 11.7–15.7)
IMM GRANULOCYTES # BLD: 0.1 10E3/UL
IMM GRANULOCYTES NFR BLD: 2 %
LACTATE SERPL-SCNC: 0.9 MMOL/L (ref 0.7–2)
LIPASE SERPL-CCNC: 23 U/L (ref 13–60)
LYMPHOCYTES # BLD AUTO: 1.2 10E3/UL (ref 0.8–5.3)
LYMPHOCYTES NFR BLD AUTO: 31 %
MAGNESIUM SERPL-MCNC: 2 MG/DL (ref 1.7–2.3)
MCH RBC QN AUTO: 24.2 PG (ref 26.5–33)
MCHC RBC AUTO-ENTMCNC: 31.4 G/DL (ref 31.5–36.5)
MCV RBC AUTO: 77 FL (ref 78–100)
MONOCYTES # BLD AUTO: 0.1 10E3/UL (ref 0–1.3)
MONOCYTES NFR BLD AUTO: 2 %
NEUTROPHILS # BLD AUTO: 2.4 10E3/UL (ref 1.6–8.3)
NEUTROPHILS NFR BLD AUTO: 64 %
NRBC # BLD AUTO: 0 10E3/UL
NRBC BLD AUTO-RTO: 0 /100
NT-PROBNP SERPL-MCNC: <36 PG/ML (ref 0–450)
PLAT MORPH BLD: NORMAL
PLATELET # BLD AUTO: 157 10E3/UL (ref 150–450)
POTASSIUM SERPL-SCNC: 4.1 MMOL/L (ref 3.4–5.3)
PROT SERPL-MCNC: 7.8 G/DL (ref 6.4–8.3)
RBC # BLD AUTO: 3.88 10E6/UL (ref 3.8–5.2)
RBC MORPH BLD: NORMAL
SODIUM SERPL-SCNC: 135 MMOL/L (ref 135–145)
TROPONIN T SERPL HS-MCNC: <6 NG/L
TSH SERPL DL<=0.005 MIU/L-ACNC: 1.89 UIU/ML (ref 0.3–4.2)
WBC # BLD AUTO: 3.7 10E3/UL (ref 4–11)

## 2024-09-25 PROCEDURE — 83735 ASSAY OF MAGNESIUM: CPT | Performed by: EMERGENCY MEDICINE

## 2024-09-25 PROCEDURE — 96375 TX/PRO/DX INJ NEW DRUG ADDON: CPT | Performed by: EMERGENCY MEDICINE

## 2024-09-25 PROCEDURE — 93010 ELECTROCARDIOGRAM REPORT: CPT | Performed by: EMERGENCY MEDICINE

## 2024-09-25 PROCEDURE — 83690 ASSAY OF LIPASE: CPT | Performed by: EMERGENCY MEDICINE

## 2024-09-25 PROCEDURE — 96361 HYDRATE IV INFUSION ADD-ON: CPT | Performed by: EMERGENCY MEDICINE

## 2024-09-25 PROCEDURE — 83605 ASSAY OF LACTIC ACID: CPT | Performed by: EMERGENCY MEDICINE

## 2024-09-25 PROCEDURE — 83880 ASSAY OF NATRIURETIC PEPTIDE: CPT | Performed by: EMERGENCY MEDICINE

## 2024-09-25 PROCEDURE — 250N000011 HC RX IP 250 OP 636: Performed by: EMERGENCY MEDICINE

## 2024-09-25 PROCEDURE — 84443 ASSAY THYROID STIM HORMONE: CPT | Performed by: EMERGENCY MEDICINE

## 2024-09-25 PROCEDURE — 71046 X-RAY EXAM CHEST 2 VIEWS: CPT

## 2024-09-25 PROCEDURE — 84484 ASSAY OF TROPONIN QUANT: CPT | Performed by: EMERGENCY MEDICINE

## 2024-09-25 PROCEDURE — 250N000009 HC RX 250: Performed by: EMERGENCY MEDICINE

## 2024-09-25 PROCEDURE — 96374 THER/PROPH/DIAG INJ IV PUSH: CPT | Mod: 59 | Performed by: EMERGENCY MEDICINE

## 2024-09-25 PROCEDURE — 258N000003 HC RX IP 258 OP 636: Performed by: EMERGENCY MEDICINE

## 2024-09-25 PROCEDURE — 99285 EMERGENCY DEPT VISIT HI MDM: CPT | Mod: 25 | Performed by: EMERGENCY MEDICINE

## 2024-09-25 PROCEDURE — 36415 COLL VENOUS BLD VENIPUNCTURE: CPT | Performed by: EMERGENCY MEDICINE

## 2024-09-25 PROCEDURE — 71046 X-RAY EXAM CHEST 2 VIEWS: CPT | Mod: 26 | Performed by: RADIOLOGY

## 2024-09-25 PROCEDURE — 84703 CHORIONIC GONADOTROPIN ASSAY: CPT | Performed by: EMERGENCY MEDICINE

## 2024-09-25 PROCEDURE — 74177 CT ABD & PELVIS W/CONTRAST: CPT

## 2024-09-25 PROCEDURE — 250N000013 HC RX MED GY IP 250 OP 250 PS 637: Performed by: EMERGENCY MEDICINE

## 2024-09-25 PROCEDURE — 93005 ELECTROCARDIOGRAM TRACING: CPT | Performed by: EMERGENCY MEDICINE

## 2024-09-25 PROCEDURE — 99285 EMERGENCY DEPT VISIT HI MDM: CPT | Performed by: EMERGENCY MEDICINE

## 2024-09-25 PROCEDURE — 74177 CT ABD & PELVIS W/CONTRAST: CPT | Mod: 26 | Performed by: RADIOLOGY

## 2024-09-25 PROCEDURE — 80053 COMPREHEN METABOLIC PANEL: CPT | Performed by: EMERGENCY MEDICINE

## 2024-09-25 PROCEDURE — 85025 COMPLETE CBC W/AUTO DIFF WBC: CPT | Performed by: EMERGENCY MEDICINE

## 2024-09-25 RX ORDER — KETOROLAC TROMETHAMINE 15 MG/ML
10 INJECTION, SOLUTION INTRAMUSCULAR; INTRAVENOUS ONCE
Status: COMPLETED | OUTPATIENT
Start: 2024-09-25 | End: 2024-09-25

## 2024-09-25 RX ORDER — MORPHINE SULFATE 2 MG/ML
2 INJECTION, SOLUTION INTRAMUSCULAR; INTRAVENOUS ONCE
Status: COMPLETED | OUTPATIENT
Start: 2024-09-25 | End: 2024-09-26

## 2024-09-25 RX ORDER — IOPAMIDOL 755 MG/ML
74 INJECTION, SOLUTION INTRAVASCULAR ONCE
Status: COMPLETED | OUTPATIENT
Start: 2024-09-25 | End: 2024-09-25

## 2024-09-25 RX ORDER — ACETAMINOPHEN 325 MG/1
975 TABLET ORAL ONCE
Status: COMPLETED | OUTPATIENT
Start: 2024-09-25 | End: 2024-09-25

## 2024-09-25 RX ORDER — ONDANSETRON 2 MG/ML
4 INJECTION INTRAMUSCULAR; INTRAVENOUS ONCE
Status: COMPLETED | OUTPATIENT
Start: 2024-09-25 | End: 2024-09-25

## 2024-09-25 RX ADMIN — ONDANSETRON 4 MG: 2 INJECTION INTRAMUSCULAR; INTRAVENOUS at 22:33

## 2024-09-25 RX ADMIN — KETOROLAC TROMETHAMINE 10 MG: 15 INJECTION, SOLUTION INTRAMUSCULAR; INTRAVENOUS at 22:31

## 2024-09-25 RX ADMIN — IOPAMIDOL 74 ML: 755 INJECTION, SOLUTION INTRAVENOUS at 23:31

## 2024-09-25 RX ADMIN — SODIUM CHLORIDE, POTASSIUM CHLORIDE, SODIUM LACTATE AND CALCIUM CHLORIDE 1000 ML: 600; 310; 30; 20 INJECTION, SOLUTION INTRAVENOUS at 22:35

## 2024-09-25 RX ADMIN — SODIUM CHLORIDE, PRESERVATIVE FREE 69 ML: 5 INJECTION INTRAVENOUS at 23:31

## 2024-09-25 RX ADMIN — ACETAMINOPHEN 975 MG: 325 TABLET ORAL at 22:30

## 2024-09-25 ASSESSMENT — ACTIVITIES OF DAILY LIVING (ADL)
ADLS_ACUITY_SCORE: 35
ADLS_ACUITY_SCORE: 35

## 2024-09-25 ASSESSMENT — COLUMBIA-SUICIDE SEVERITY RATING SCALE - C-SSRS
2. HAVE YOU ACTUALLY HAD ANY THOUGHTS OF KILLING YOURSELF IN THE PAST MONTH?: NO
6. HAVE YOU EVER DONE ANYTHING, STARTED TO DO ANYTHING, OR PREPARED TO DO ANYTHING TO END YOUR LIFE?: NO
1. IN THE PAST MONTH, HAVE YOU WISHED YOU WERE DEAD OR WISHED YOU COULD GO TO SLEEP AND NOT WAKE UP?: NO

## 2024-09-25 NOTE — LETTER
September 26, 2024      To Whom It May Concern:      Camelia Corea was seen in our Emergency Department today, 09/26/24.  I expect her condition to improve over the next 1 day.  She may return to work/school when improved.    Sincerely,        Aniya Arzate MD

## 2024-09-26 VITALS
TEMPERATURE: 97.7 F | HEART RATE: 109 BPM | SYSTOLIC BLOOD PRESSURE: 116 MMHG | OXYGEN SATURATION: 100 % | RESPIRATION RATE: 18 BRPM | DIASTOLIC BLOOD PRESSURE: 59 MMHG

## 2024-09-26 LAB
ALBUMIN UR-MCNC: 30 MG/DL
APPEARANCE UR: ABNORMAL
ATRIAL RATE - MUSE: 100 BPM
BACTERIA #/AREA URNS HPF: ABNORMAL /HPF
BILIRUB UR QL STRIP: NEGATIVE
C TRACH DNA SPEC QL NAA+PROBE: NEGATIVE
COLOR UR AUTO: YELLOW
DIASTOLIC BLOOD PRESSURE - MUSE: NORMAL MMHG
GLUCOSE UR STRIP-MCNC: NEGATIVE MG/DL
HGB UR QL STRIP: ABNORMAL
INTERPRETATION ECG - MUSE: NORMAL
KETONES UR STRIP-MCNC: ABNORMAL MG/DL
LEUKOCYTE ESTERASE UR QL STRIP: ABNORMAL
MUCOUS THREADS #/AREA URNS LPF: PRESENT /LPF
N GONORRHOEA DNA SPEC QL NAA+PROBE: NEGATIVE
NITRATE UR QL: NEGATIVE
P AXIS - MUSE: 51 DEGREES
PH UR STRIP: 6 [PH] (ref 5–7)
PR INTERVAL - MUSE: 152 MS
QRS DURATION - MUSE: 70 MS
QT - MUSE: 354 MS
QTC - MUSE: 456 MS
R AXIS - MUSE: 73 DEGREES
RBC URINE: 61 /HPF
SP GR UR STRIP: 1.03 (ref 1–1.03)
SQUAMOUS EPITHELIAL: <1 /HPF
SYSTOLIC BLOOD PRESSURE - MUSE: NORMAL MMHG
T AXIS - MUSE: 44 DEGREES
UROBILINOGEN UR STRIP-MCNC: NORMAL MG/DL
VENTRICULAR RATE- MUSE: 100 BPM
WBC URINE: 55 /HPF

## 2024-09-26 PROCEDURE — 250N000013 HC RX MED GY IP 250 OP 250 PS 637: Performed by: EMERGENCY MEDICINE

## 2024-09-26 PROCEDURE — 258N000003 HC RX IP 258 OP 636: Performed by: EMERGENCY MEDICINE

## 2024-09-26 PROCEDURE — 250N000011 HC RX IP 250 OP 636: Performed by: EMERGENCY MEDICINE

## 2024-09-26 PROCEDURE — 87591 N.GONORRHOEAE DNA AMP PROB: CPT | Performed by: EMERGENCY MEDICINE

## 2024-09-26 PROCEDURE — 81001 URINALYSIS AUTO W/SCOPE: CPT | Performed by: EMERGENCY MEDICINE

## 2024-09-26 PROCEDURE — 250N000009 HC RX 250: Performed by: EMERGENCY MEDICINE

## 2024-09-26 PROCEDURE — 87086 URINE CULTURE/COLONY COUNT: CPT | Performed by: EMERGENCY MEDICINE

## 2024-09-26 PROCEDURE — 87491 CHLMYD TRACH DNA AMP PROBE: CPT | Performed by: EMERGENCY MEDICINE

## 2024-09-26 RX ORDER — CEPHALEXIN 500 MG/1
500 CAPSULE ORAL ONCE
Status: COMPLETED | OUTPATIENT
Start: 2024-09-26 | End: 2024-09-26

## 2024-09-26 RX ORDER — CEPHALEXIN 500 MG/1
500 CAPSULE ORAL 3 TIMES DAILY
Qty: 15 CAPSULE | Refills: 0 | Status: SHIPPED | OUTPATIENT
Start: 2024-09-26 | End: 2024-10-01

## 2024-09-26 RX ORDER — ONDANSETRON 4 MG/1
4 TABLET, ORALLY DISINTEGRATING ORAL EVERY 8 HOURS PRN
Qty: 9 TABLET | Refills: 0 | Status: SHIPPED | OUTPATIENT
Start: 2024-09-26 | End: 2024-09-29

## 2024-09-26 RX ADMIN — PANTOPRAZOLE SODIUM 40 MG: 40 INJECTION, POWDER, FOR SOLUTION INTRAVENOUS at 00:20

## 2024-09-26 RX ADMIN — MORPHINE SULFATE 2 MG: 2 INJECTION, SOLUTION INTRAMUSCULAR; INTRAVENOUS at 00:20

## 2024-09-26 RX ADMIN — SODIUM CHLORIDE, POTASSIUM CHLORIDE, SODIUM LACTATE AND CALCIUM CHLORIDE 1000 ML: 600; 310; 30; 20 INJECTION, SOLUTION INTRAVENOUS at 00:45

## 2024-09-26 RX ADMIN — CEPHALEXIN 500 MG: 500 CAPSULE ORAL at 01:29

## 2024-09-26 ASSESSMENT — ACTIVITIES OF DAILY LIVING (ADL): ADLS_ACUITY_SCORE: 35

## 2024-09-26 NOTE — ED TRIAGE NOTES
Pt BIBA for chest pain, nausea, vomiting, abdominal pain, and headache. Pt reports this has been ongoing for about a week. An hour prior to arrival pt reported inhalation of nitrous oxide.

## 2024-09-26 NOTE — DISCHARGE INSTRUCTIONS
For pain, please take 975-1000mg acetaminophen (tylenol) every 8 hours -- do not take more than 3000mg in a 24 hour period.    You can also take 600mg ibuprofen (motrin/advil) every 6 hours for pain  Please take antibiotics for likely urine infection

## 2024-09-26 NOTE — ED NOTES
Patient discharge accompanied by her mother. Reviewed all discharge instructions including medication administration and patient verbalized understanding. Denied further questions. PIV removed.

## 2024-09-26 NOTE — ED PROVIDER NOTES
[place] EMERGENCY DEPARTMENT ([facility])    9/25/24       ED PROVIDER NOTE       History     Chief Complaint   Patient presents with    Chest Pain    Abdominal Pain    Headache    Nausea, Vomiting, & Diarrhea     HPI  Camelia Corea is a 23 female with no medical history presents with 2 days of low-grade fever, achy lower abdominal pain, cough and chest pain with sore throat. Of note patient is recreationally using nitric oxide using whippets. In privacy patient denies other drug, alcohol use and without sexual activity in the past 6 months.      Physical Exam   BP: 124/67  Pulse: 108  Temp: (!) 100.5  F (38.1  C)  Resp: 18  SpO2: 100 %  Physical Exam  Patient's appears fatigued   However breathing comfortably   Lungs clear   Heart tachycardic  Abdomen with tenderness in lower quadrants no pedal edema     ED Course, Procedures, & Data      Procedures            EKG Interpretation:      Interpreted by Jurgen Kenny  Time reviewed: 22:03  Symptoms at time of EKG: chest pain    Rhythm: normal sinus  Rate: 100 bpm  Axis: normal  ST Segments/ T Waves: No ST elevations or depressions  Comparison to prior: No old EKG available    Clinical Impression: nonischemic EKG           Results for orders placed or performed during the hospital encounter of 09/25/24   XR Chest 2 Views     Status: None    Narrative    EXAM: XR CHEST 2 VIEWS  LOCATION: [facility]  DATE: 9/25/2024    INDICATION: chest pain, fever  COMPARISON: 02/13/2021      Impression    IMPRESSION: Cardiomediastinal silhouette within normal limits. No focal consolidation or pleural effusion.   CT Abdomen Pelvis w Contrast     Status: None    Narrative    EXAM: CT ABDOMEN PELVIS W CONTRAST  LOCATION: [facility]  DATE: 9/25/2024    INDICATION: Lower quadrant tender and fever  COMPARISON: 02/13/2021  TECHNIQUE: CT scan of the abdomen and pelvis was performed following  injection of IV contrast. Multiplanar reformats were obtained. Dose reduction techniques were used.  CONTRAST: 74ml isovue 370    FINDINGS:   LOWER CHEST: Lung bases clear.    HEPATOBILIARY: Normal. Artifact over the liver.    PANCREAS: Normal.    SPLEEN: Normal.    ADRENAL GLANDS: Normal.    KIDNEYS/BLADDER: Normal.    BOWEL: Normal caliber. Appendix not seen with certainty. No right lower quadrant inflammation.    LYMPH NODES: Normal.    VASCULATURE: Normal.    PELVIC ORGANS: 3.1 cm left ovarian cyst. Trace of pelvic free fluid.    MUSCULOSKELETAL: Normal.      Impression    IMPRESSION:   1.  No inflammatory change, bowel obstruction or abscess.  2.  3.1 cm left ovarian cyst.   Comprehensive metabolic panel     Status: Abnormal   Result Value Ref Range    Sodium 135 135 - 145 mmol/L    Potassium 4.1 3.4 - 5.3 mmol/L    Carbon Dioxide (CO2) 22 22 - 29 mmol/L    Anion Gap 11 7 - 15 mmol/L    Urea Nitrogen 22.4 (H) 6.0 - 20.0 mg/dL    Creatinine 0.62 0.51 - 0.95 mg/dL    GFR Estimate >90 >60 mL/min/1.73m2    Calcium 8.8 8.8 - 10.4 mg/dL    Chloride 102 98 - 107 mmol/L    Glucose 115 (H) 70 - 99 mg/dL    Alkaline Phosphatase 76 40 - 150 U/L    AST 60 (H) 0 - 45 U/L    ALT 25 0 - 50 U/L    Protein Total 7.8 6.4 - 8.3 g/dL    Albumin 3.9 3.5 - 5.2 g/dL    Bilirubin Total 0.3 <=1.2 mg/dL   Lactic acid whole blood with 1x repeat in 2 hr when >2     Status: Normal   Result Value Ref Range    Lactic Acid, Initial 0.9 0.7 - 2.0 mmol/L   Lipase     Status: Normal   Result Value Ref Range    Lipase 23 13 - 60 U/L   Magnesium     Status: Normal   Result Value Ref Range    Magnesium 2.0 1.7 - 2.3 mg/dL   HCG qualitative Blood     Status: Normal   Result Value Ref Range    hCG Serum Qualitative Negative Negative   UA with Microscopic reflex to Culture     Status: Abnormal    Specimen: Urine, Midstream   Result Value Ref Range    Color Urine Yellow Colorless, Straw, Light Yellow, Yellow    Appearance Urine Slightly Cloudy (A)  Clear    Glucose Urine Negative Negative mg/dL    Bilirubin Urine Negative Negative    Ketones Urine Trace (A) Negative mg/dL    Specific Gravity Urine 1.034 1.003 - 1.035    Blood Urine Large (A) Negative    pH Urine 6.0 5.0 - 7.0    Protein Albumin Urine 30 (A) Negative mg/dL    Urobilinogen Urine Normal Normal, 2.0 mg/dL    Nitrite Urine Negative Negative    Leukocyte Esterase Urine Large (A) Negative    Bacteria Urine Many (A) None Seen /HPF    Mucus Urine Present (A) None Seen /LPF    RBC Urine 61 (H) <=2 /HPF    WBC Urine 55 (H) <=5 /HPF    Squamous Epithelials Urine <1 <=1 /HPF    Narrative    Urine Culture ordered based on laboratory criteria   Troponin T, High Sensitivity     Status: Normal   Result Value Ref Range    Troponin T, High Sensitivity <6 <=14 ng/L   TSH with free T4 reflex     Status: Normal   Result Value Ref Range    TSH 1.89 0.30 - 4.20 uIU/mL   Nt probnp inpatient (BNP)     Status: Normal   Result Value Ref Range    N terminal Pro BNP Inpatient <36 0 - 450 pg/mL   CBC with platelets and differential     Status: Abnormal   Result Value Ref Range    WBC Count 3.7 (L) 4.0 - 11.0 10e3/uL    RBC Count 3.88 3.80 - 5.20 10e6/uL    Hemoglobin 9.4 (L) 11.7 - 15.7 g/dL    Hematocrit 29.9 (L) 35.0 - 47.0 %    MCV 77 (L) 78 - 100 fL    MCH 24.2 (L) 26.5 - 33.0 pg    MCHC 31.4 (L) 31.5 - 36.5 g/dL    RDW 23.9 (H) 10.0 - 15.0 %    Platelet Count 157 150 - 450 10e3/uL    % Neutrophils 64 %    % Lymphocytes 31 %    % Monocytes 2 %    % Eosinophils 1 %    % Basophils 0 %    % Immature Granulocytes 2 %    NRBCs per 100 WBC 0 <1 /100    Absolute Neutrophils 2.4 1.6 - 8.3 10e3/uL    Absolute Lymphocytes 1.2 0.8 - 5.3 10e3/uL    Absolute Monocytes 0.1 0.0 - 1.3 10e3/uL    Absolute Eosinophils 0.1 0.0 - 0.7 10e3/uL    Absolute Basophils 0.0 0.0 - 0.2 10e3/uL    Absolute Immature Granulocytes 0.1 <=0.4 10e3/uL    Absolute NRBCs 0.0 10e3/uL   RBC and Platelet Morphology     Status: None   Result Value Ref Range     RBC Morphology Confirmed RBC Indices     Platelet Assessment  Automated Count Confirmed. Platelet morphology is normal.     Automated Count Confirmed. Platelet morphology is normal.   EKG 12-lead, tracing only     Status: None (Preliminary result)   Result Value Ref Range    Systolic Blood Pressure  mmHg    Diastolic Blood Pressure  mmHg    Ventricular Rate 100 BPM    Atrial Rate 100 BPM    AR Interval 152 ms    QRS Duration 70 ms     ms    QTc 456 ms    P Axis 51 degrees    R AXIS 73 degrees    T Axis 44 degrees    Interpretation ECG Sinus rhythm  Normal ECG      CBC with platelets differential     Status: Abnormal    Narrative    The following orders were created for panel order CBC with platelets differential.  Procedure                               Abnormality         Status                     ---------                               -----------         ------                     CBC with platelets and d...[[id number]]  Abnormal            Final result               RBC and Platelet Morphology[[id number]]                      Final result                 Please view results for these tests on the individual orders.     Medications   lactated ringers BOLUS 1,000 mL (1,000 mLs Intravenous $New Bag 9/26/24 0045)   cephALEXin (KEFLEX) capsule 500 mg (has no administration in time range)   acetaminophen (TYLENOL) tablet 975 mg (975 mg Oral $Given 9/25/24 2230)   ketorolac (TORADOL) injection 10 mg (10 mg Intravenous $Given 9/25/24 2231)   ondansetron (ZOFRAN) injection 4 mg (4 mg Intravenous $Given 9/25/24 2233)   lactated ringers BOLUS 1,000 mL (0 mLs Intravenous Stopped 9/26/24 0045)   iopamidol (ISOVUE-370) solution 74 mL (74 mLs Intravenous $Given 9/25/24 2331)   sodium chloride 0.9 % bag 500mL for CT scan flush use (69 mLs Intravenous $Given 9/25/24 2331)   morphine (PF) injection 2 mg (2 mg Intravenous $Given 9/26/24 0020)   pantoprazole (PROTONIX) IV push injection 40 mg (40 mg Intravenous $Given 9/26/24  0020)     Labs Ordered and Resulted from Time of ED Arrival to Time of ED Departure   COMPREHENSIVE METABOLIC PANEL - Abnormal       Result Value    Sodium 135      Potassium 4.1      Carbon Dioxide (CO2) 22      Anion Gap 11      Urea Nitrogen 22.4 (*)     Creatinine 0.62      GFR Estimate >90      Calcium 8.8      Chloride 102      Glucose 115 (*)     Alkaline Phosphatase 76      AST 60 (*)     ALT 25      Protein Total 7.8      Albumin 3.9      Bilirubin Total 0.3     ROUTINE UA WITH MICROSCOPIC REFLEX TO CULTURE - Abnormal    Color Urine Yellow      Appearance Urine Slightly Cloudy (*)     Glucose Urine Negative      Bilirubin Urine Negative      Ketones Urine Trace (*)     Specific Gravity Urine 1.034      Blood Urine Large (*)     pH Urine 6.0      Protein Albumin Urine 30 (*)     Urobilinogen Urine Normal      Nitrite Urine Negative      Leukocyte Esterase Urine Large (*)     Bacteria Urine Many (*)     Mucus Urine Present (*)     RBC Urine 61 (*)     WBC Urine 55 (*)     Squamous Epithelials Urine <1     CBC WITH PLATELETS AND DIFFERENTIAL - Abnormal    WBC Count 3.7 (*)     RBC Count 3.88      Hemoglobin 9.4 (*)     Hematocrit 29.9 (*)     MCV 77 (*)     MCH 24.2 (*)     MCHC 31.4 (*)     RDW 23.9 (*)     Platelet Count 157      % Neutrophils 64      % Lymphocytes 31      % Monocytes 2      % Eosinophils 1      % Basophils 0      % Immature Granulocytes 2      NRBCs per 100 WBC 0      Absolute Neutrophils 2.4      Absolute Lymphocytes 1.2      Absolute Monocytes 0.1      Absolute Eosinophils 0.1      Absolute Basophils 0.0      Absolute Immature Granulocytes 0.1      Absolute NRBCs 0.0     LACTIC ACID WHOLE BLOOD WITH 1X REPEAT IN 2 HR WHEN >2 - Normal    Lactic Acid, Initial 0.9     LIPASE - Normal    Lipase 23     MAGNESIUM - Normal    Magnesium 2.0     HCG QUALITATIVE PREGNANCY - Normal    hCG Serum Qualitative Negative     TROPONIN T, HIGH SENSITIVITY - Normal    Troponin T, High Sensitivity <6     TSH  WITH FREE T4 REFLEX - Normal    TSH 1.89     NT PROBNP INPATIENT - Normal    N terminal Pro BNP Inpatient <36     RBC AND PLATELET MORPHOLOGY    RBC Morphology Confirmed RBC Indices      Platelet Assessment        Value: Automated Count Confirmed. Platelet morphology is normal.   CHLAMYDIA TRACHOMATIS PCR   NEISSERIA GONORRHOEAE PCR   URINE CULTURE     CT Abdomen Pelvis w Contrast   Final Result   IMPRESSION:    1.  No inflammatory change, bowel obstruction or abscess.   2.  3.1 cm left ovarian cyst.      XR Chest 2 Views   Final Result   IMPRESSION: Cardiomediastinal silhouette within normal limits. No focal consolidation or pleural effusion.             Critical care was not performed.     Medical Decision Making  The patient's presentation was of high complexity (an acute health issue posing potential threat to life or bodily function).    The patient's evaluation involved:  an assessment requiring an independent historian (mother at bedside)  ordering and/or review of 2 test(s) in this encounter (see separate area of note for details)  independent interpretation of testing performed by another health professional (chest x-ray)    The patient's management necessitated high risk (a parenteral controlled substance).    Assessment & Plan    Abdominal pain, cough and fever. Differential diagnosis includes viral syndrome pneumonia, intra-abdominal infection and urine infection. Will rule out acute coronary syndrome or pneumothorax. Plan chest x-ray, CT abdomen and pelvis. Labs including lactate, urinalysis, pregnancy test, IV fluids, antiemetics and IV pain medicines    11:15 PM reassessment   Patient feeling somewhat improved but still having abdominal pain.  Will add IV morphine and IV pantoprazole.  IV fluids continue.      I have independently reviewed the chest x-ray imaging showing no signs of   pneumothorax or pneumonia.     Pregnancy negative.  Labs consistent with anemia, though no signs of bleeding.  pending  CT abdomen pelvis and urinalysis    1 AM -urinalysis positive for leuk esterase and white cells.  Given patient's fever, UTIs in differential diagnosis so given that she has no primary care doctor currently will discharge with 5 days of antibiotics and give first dose now      I have reviewed the nursing notes. I have reviewed the findings, diagnosis, plan and need for follow up with the patient.    New Prescriptions    CEPHALEXIN (KEFLEX) 500 MG CAPSULE    Take 1 capsule (500 mg) by mouth 3 times daily for 5 days.    ONDANSETRON (ZOFRAN ODT) 4 MG ODT TAB    Take 1 tablet (4 mg) by mouth every 8 hours as needed.       Final diagnoses:   Chest pain, unspecified type   Fever in adult   Lower abdominal pain   Acute lower UTI   IArielle, am serving as a trained medical scribe to document services personally performed by Jurgen Kenny MD, based on the provider's statements to me.     IJurgen MD, was physically present and have reviewed and verified the accuracy of this note documented by Arielle Walter.     Jurgen Kenny MD  [facility] EMERGENCY DEPARTMENT  9/25/2024     Jurgen Kenny MD  09/25/24 [id number]       Jurgen Kenny MD  09/26/24 0059

## 2024-09-26 NOTE — ED NOTES
Bed: ED26  Expected date:   Expected time:   Means of arrival:   Comments:  [id number]  23F  Chest pain   Nausea vomiting  Nitrous oxide use

## 2024-09-27 ENCOUNTER — TELEPHONE (OUTPATIENT)
Dept: NURSING | Facility: CLINIC | Age: 24
End: 2024-09-27

## 2024-09-27 LAB — BACTERIA UR CULT: ABNORMAL

## 2024-09-27 NOTE — LETTER
September 27, 2024        Camelia Corea  [address] [place] [address]          Camelia Corea:    You were seen in the [facility] Emergency Department at [facility] ([place] on 9/26/2024.  We are unable to reach you by phone, so we are sending you this letter.     Please call [facility] Emergency Department lab result nurse at [phone], as we have information to relay to you.    Best time to call back is between 9AM and 5:30PM, 7 days a week.      Sincerely,     [facility] Emergency Department Lab Result RN  [phone]

## 2024-09-27 NOTE — TELEPHONE ENCOUNTER
[facility] ([place])    Reason for call: Lab Result Notification     Lab Result (including Rx patient on, if applicable).  If culture, copy of lab report at bottom.  Lab Result: Final Urine Culture Report on 9/27/24  [facility] Emergency Dept discharge antibiotic prescribed: Cephalexin (Keflex) 500 mg capsule, 1 capsule (500 mg) by mouth 3 times daily for 5 days.   Bacterial Growth: >100,000 CFU/ML Escherichia coli       Patient's current Symptoms:   At 4:27PM, No answer and Unable tl leave a voicemail message requesting a call back to [facility] ED Lab Result RN at [phone]. RN is available every day between 9 a.m. and 5:30 p.m.   Letter sent via [employer]    RN Recommendations/Instructions per [facility] ED lab result protocol:   [facility] ED lab result protocol utilized: urine culture  No change in treatment per [facility] ED lab result Urine Culture protocol.          Abdiaziz Gomez RN

## 2024-10-22 ENCOUNTER — APPOINTMENT (OUTPATIENT)
Dept: GENERAL RADIOLOGY | Facility: CLINIC | Age: 24
DRG: 809 | End: 2024-10-22
Attending: FAMILY MEDICINE

## 2024-10-22 ENCOUNTER — HOSPITAL ENCOUNTER (INPATIENT)
Facility: CLINIC | Age: 24
LOS: 5 days | Discharge: HOME | DRG: 809 | End: 2024-10-27
Attending: FAMILY MEDICINE | Admitting: STUDENT IN AN ORGANIZED HEALTH CARE EDUCATION/TRAINING PROGRAM

## 2024-10-22 DIAGNOSIS — E53.8 VITAMIN B 12 DEFICIENCY: ICD-10-CM

## 2024-10-22 DIAGNOSIS — R11.2 NAUSEA AND VOMITING, UNSPECIFIED VOMITING TYPE: Primary | ICD-10-CM

## 2024-10-22 DIAGNOSIS — G62.9 NEUROPATHY: ICD-10-CM

## 2024-10-22 DIAGNOSIS — F41.9 ANXIETY: ICD-10-CM

## 2024-10-22 DIAGNOSIS — D61.818 PANCYTOPENIA (H): ICD-10-CM

## 2024-10-22 DIAGNOSIS — F19.11 HISTORY OF SUBSTANCE ABUSE (H): ICD-10-CM

## 2024-10-22 LAB
ABO/RH(D): NORMAL
ALBUMIN SERPL BCG-MCNC: 4.5 G/DL (ref 3.5–5.2)
ALBUMIN UR-MCNC: 100 MG/DL
ALP SERPL-CCNC: 67 U/L (ref 40–150)
ALT SERPL W P-5'-P-CCNC: 31 U/L (ref 0–50)
AMPHETAMINES UR QL SCN: ABNORMAL
ANION GAP SERPL CALCULATED.3IONS-SCNC: 12 MMOL/L (ref 7–15)
ANTIBODY SCREEN: NEGATIVE
APPEARANCE UR: ABNORMAL
APTT PPP: 26 SECONDS (ref 22–38)
AST SERPL W P-5'-P-CCNC: 52 U/L (ref 0–45)
BARBITURATES UR QL SCN: ABNORMAL
BASOPHILS # BLD AUTO: 0 10E3/UL (ref 0–0.2)
BASOPHILS # BLD MANUAL: 0 10E3/UL (ref 0–0.2)
BASOPHILS NFR BLD AUTO: 0 %
BASOPHILS NFR BLD MANUAL: 0 %
BENZODIAZ UR QL SCN: ABNORMAL
BILIRUB SERPL-MCNC: 0.5 MG/DL
BILIRUB UR QL STRIP: NEGATIVE
BUN SERPL-MCNC: 24.7 MG/DL (ref 6–20)
BZE UR QL SCN: ABNORMAL
CALCIUM SERPL-MCNC: 8.9 MG/DL (ref 8.8–10.4)
CANNABINOIDS UR QL SCN: ABNORMAL
CHLORIDE SERPL-SCNC: 109 MMOL/L (ref 98–107)
COLOR UR AUTO: ABNORMAL
CREAT SERPL-MCNC: 0.98 MG/DL (ref 0.51–0.95)
CRP SERPL-MCNC: 5.57 MG/L
DACRYOCYTES BLD QL SMEAR: SLIGHT
EGFRCR SERPLBLD CKD-EPI 2021: 82 ML/MIN/1.73M2
ELLIPTOCYTES BLD QL SMEAR: SLIGHT
EOSINOPHIL # BLD AUTO: 0 10E3/UL (ref 0–0.7)
EOSINOPHIL # BLD MANUAL: 0 10E3/UL (ref 0–0.7)
EOSINOPHIL NFR BLD AUTO: 0 %
EOSINOPHIL NFR BLD MANUAL: 0 %
ERYTHROCYTE [DISTWIDTH] IN BLOOD BY AUTOMATED COUNT: 23.7 % (ref 10–15)
ERYTHROCYTE [DISTWIDTH] IN BLOOD BY AUTOMATED COUNT: 23.9 % (ref 10–15)
ETHANOL SERPL-MCNC: <0.01 G/DL
FENTANYL UR QL: ABNORMAL
FIBRINOGEN PPP-MCNC: 239 MG/DL (ref 170–510)
FLUAV RNA SPEC QL NAA+PROBE: NEGATIVE
FLUBV RNA RESP QL NAA+PROBE: NEGATIVE
FRAGMENTS BLD QL SMEAR: ABNORMAL
FRAGMENTS BLD QL SMEAR: SLIGHT
GLUCOSE SERPL-MCNC: 111 MG/DL (ref 70–99)
GLUCOSE UR STRIP-MCNC: NEGATIVE MG/DL
HCG UR QL: NEGATIVE
HCO3 SERPL-SCNC: 21 MMOL/L (ref 22–29)
HCT VFR BLD AUTO: 20 % (ref 35–47)
HCT VFR BLD AUTO: 21.8 % (ref 35–47)
HCV AB SERPL QL IA: NONREACTIVE
HGB BLD-MCNC: 5.4 G/DL (ref 11.7–15.7)
HGB BLD-MCNC: 6.5 G/DL (ref 11.7–15.7)
HGB BLD-MCNC: 7 G/DL (ref 11.7–15.7)
HGB UR QL STRIP: ABNORMAL
IMM GRANULOCYTES # BLD: 0.1 10E3/UL
IMM GRANULOCYTES NFR BLD: 5 %
INR PPP: 1.16 (ref 0.85–1.15)
IRON BINDING CAPACITY (ROCHE): 208 UG/DL (ref 240–430)
IRON SATN MFR SERPL: 77 % (ref 15–46)
IRON SERPL-MCNC: 161 UG/DL (ref 37–145)
KETONES UR STRIP-MCNC: 10 MG/DL
LACTATE SERPL-SCNC: 1.3 MMOL/L (ref 0.7–2)
LDH SERPL L TO P-CCNC: 1793 U/L (ref 0–250)
LEUKOCYTE ESTERASE UR QL STRIP: ABNORMAL
LIPASE SERPL-CCNC: 96 U/L (ref 13–60)
LYMPHOCYTES # BLD AUTO: 1.8 10E3/UL (ref 0.8–5.3)
LYMPHOCYTES # BLD MANUAL: 1.8 10E3/UL (ref 0.8–5.3)
LYMPHOCYTES NFR BLD AUTO: 79 %
LYMPHOCYTES NFR BLD MANUAL: 80 %
MCH RBC QN AUTO: 24.6 PG (ref 26.5–33)
MCH RBC QN AUTO: 24.9 PG (ref 26.5–33)
MCHC RBC AUTO-ENTMCNC: 32.1 G/DL (ref 31.5–36.5)
MCHC RBC AUTO-ENTMCNC: 32.5 G/DL (ref 31.5–36.5)
MCV RBC AUTO: 77 FL (ref 78–100)
MCV RBC AUTO: 77 FL (ref 78–100)
MONOCYTES # BLD AUTO: 0.1 10E3/UL (ref 0–1.3)
MONOCYTES # BLD MANUAL: 0.2 10E3/UL (ref 0–1.3)
MONOCYTES NFR BLD AUTO: 4 %
MONOCYTES NFR BLD MANUAL: 9 %
MUCOUS THREADS #/AREA URNS LPF: PRESENT /LPF
NEUTROPHILS # BLD AUTO: 0.3 10E3/UL (ref 1.6–8.3)
NEUTROPHILS # BLD MANUAL: 0.3 10E3/UL (ref 1.6–8.3)
NEUTROPHILS NFR BLD AUTO: 13 %
NEUTROPHILS NFR BLD MANUAL: 11 %
NITRATE UR QL: NEGATIVE
NRBC # BLD AUTO: 0 10E3/UL
NRBC BLD AUTO-RTO: 0 /100
OPIATES UR QL SCN: ABNORMAL
PCP QUAL URINE (ROCHE): ABNORMAL
PH UR STRIP: 6 [PH] (ref 5–7)
PLAT MORPH BLD: ABNORMAL
PLATELET # BLD AUTO: 33 10E3/UL (ref 150–450)
PLATELET # BLD AUTO: 35 10E3/UL (ref 150–450)
POTASSIUM SERPL-SCNC: 3.7 MMOL/L (ref 3.4–5.3)
PROCALCITONIN SERPL IA-MCNC: 0.25 NG/ML
PROT SERPL-MCNC: 8 G/DL (ref 6.4–8.3)
RBC # BLD AUTO: 2.61 10E6/UL (ref 3.8–5.2)
RBC # BLD AUTO: 2.84 10E6/UL (ref 3.8–5.2)
RBC MORPH BLD: ABNORMAL
RBC URINE: >182 /HPF
RETICS # AUTO: <0.01 10E6/UL (ref 0.03–0.1)
RETICS # AUTO: <0.01 10E6/UL (ref 0.03–0.1)
RETICS/RBC NFR AUTO: 0.2 % (ref 0.5–2)
RETICS/RBC NFR AUTO: 0.2 % (ref 0.5–2)
RSV RNA SPEC NAA+PROBE: NEGATIVE
SARS-COV-2 RNA RESP QL NAA+PROBE: NEGATIVE
SODIUM SERPL-SCNC: 142 MMOL/L (ref 135–145)
SP GR UR STRIP: 1.03 (ref 1–1.03)
SPECIMEN EXPIRATION DATE: NORMAL
UROBILINOGEN UR STRIP-MCNC: NORMAL MG/DL
WBC # BLD AUTO: 2.3 10E3/UL (ref 4–11)
WBC # BLD AUTO: 2.3 10E3/UL (ref 4–11)
WBC CLUMPS #/AREA URNS HPF: PRESENT /HPF
WBC URINE: 97 /HPF

## 2024-10-22 PROCEDURE — 99285 EMERGENCY DEPT VISIT HI MDM: CPT | Mod: 25 | Performed by: FAMILY MEDICINE

## 2024-10-22 PROCEDURE — 84145 PROCALCITONIN (PCT): CPT

## 2024-10-22 PROCEDURE — 83540 ASSAY OF IRON: CPT

## 2024-10-22 PROCEDURE — 85610 PROTHROMBIN TIME: CPT | Performed by: FAMILY MEDICINE

## 2024-10-22 PROCEDURE — 87637 SARSCOV2&INF A&B&RSV AMP PRB: CPT

## 2024-10-22 PROCEDURE — 85007 BL SMEAR W/DIFF WBC COUNT: CPT | Performed by: FAMILY MEDICINE

## 2024-10-22 PROCEDURE — 87798 DETECT AGENT NOS DNA AMP: CPT | Performed by: FAMILY MEDICINE

## 2024-10-22 PROCEDURE — 82077 ASSAY SPEC XCP UR&BREATH IA: CPT | Performed by: FAMILY MEDICINE

## 2024-10-22 PROCEDURE — 83690 ASSAY OF LIPASE: CPT | Performed by: FAMILY MEDICINE

## 2024-10-22 PROCEDURE — 83615 LACTATE (LD) (LDH) ENZYME: CPT | Performed by: FAMILY MEDICINE

## 2024-10-22 PROCEDURE — 86900 BLOOD TYPING SEROLOGIC ABO: CPT | Performed by: FAMILY MEDICINE

## 2024-10-22 PROCEDURE — 86140 C-REACTIVE PROTEIN: CPT

## 2024-10-22 PROCEDURE — 81025 URINE PREGNANCY TEST: CPT | Performed by: FAMILY MEDICINE

## 2024-10-22 PROCEDURE — 96374 THER/PROPH/DIAG INJ IV PUSH: CPT | Performed by: FAMILY MEDICINE

## 2024-10-22 PROCEDURE — 250N000011 HC RX IP 250 OP 636

## 2024-10-22 PROCEDURE — 250N000011 HC RX IP 250 OP 636: Performed by: FAMILY MEDICINE

## 2024-10-22 PROCEDURE — 36415 COLL VENOUS BLD VENIPUNCTURE: CPT

## 2024-10-22 PROCEDURE — 120N000002 HC R&B MED SURG/OB UMMC

## 2024-10-22 PROCEDURE — 85018 HEMOGLOBIN: CPT

## 2024-10-22 PROCEDURE — 99222 1ST HOSP IP/OBS MODERATE 55: CPT | Mod: FS | Performed by: STUDENT IN AN ORGANIZED HEALTH CARE EDUCATION/TRAINING PROGRAM

## 2024-10-22 PROCEDURE — 85060 BLOOD SMEAR INTERPRETATION: CPT | Performed by: PATHOLOGY

## 2024-10-22 PROCEDURE — 82247 BILIRUBIN TOTAL: CPT | Performed by: FAMILY MEDICINE

## 2024-10-22 PROCEDURE — 82040 ASSAY OF SERUM ALBUMIN: CPT | Performed by: FAMILY MEDICINE

## 2024-10-22 PROCEDURE — 81001 URINALYSIS AUTO W/SCOPE: CPT | Performed by: FAMILY MEDICINE

## 2024-10-22 PROCEDURE — 85025 COMPLETE CBC W/AUTO DIFF WBC: CPT | Performed by: FAMILY MEDICINE

## 2024-10-22 PROCEDURE — 87581 M.PNEUMON DNA AMP PROBE: CPT

## 2024-10-22 PROCEDURE — 85730 THROMBOPLASTIN TIME PARTIAL: CPT | Performed by: FAMILY MEDICINE

## 2024-10-22 PROCEDURE — 258N000003 HC RX IP 258 OP 636: Performed by: FAMILY MEDICINE

## 2024-10-22 PROCEDURE — 71046 X-RAY EXAM CHEST 2 VIEWS: CPT

## 2024-10-22 PROCEDURE — 250N000009 HC RX 250

## 2024-10-22 PROCEDURE — 84550 ASSAY OF BLOOD/URIC ACID: CPT

## 2024-10-22 PROCEDURE — 83010 ASSAY OF HAPTOGLOBIN QUANT: CPT | Performed by: FAMILY MEDICINE

## 2024-10-22 PROCEDURE — 86923 COMPATIBILITY TEST ELECTRIC: CPT | Performed by: STUDENT IN AN ORGANIZED HEALTH CARE EDUCATION/TRAINING PROGRAM

## 2024-10-22 PROCEDURE — 86803 HEPATITIS C AB TEST: CPT | Performed by: FAMILY MEDICINE

## 2024-10-22 PROCEDURE — 83735 ASSAY OF MAGNESIUM: CPT

## 2024-10-22 PROCEDURE — 80307 DRUG TEST PRSMV CHEM ANLYZR: CPT | Performed by: FAMILY MEDICINE

## 2024-10-22 PROCEDURE — 85384 FIBRINOGEN ACTIVITY: CPT | Performed by: FAMILY MEDICINE

## 2024-10-22 PROCEDURE — 85045 AUTOMATED RETICULOCYTE COUNT: CPT | Performed by: FAMILY MEDICINE

## 2024-10-22 PROCEDURE — 85027 COMPLETE CBC AUTOMATED: CPT | Performed by: FAMILY MEDICINE

## 2024-10-22 PROCEDURE — 83550 IRON BINDING TEST: CPT

## 2024-10-22 PROCEDURE — 96361 HYDRATE IV INFUSION ADD-ON: CPT | Performed by: FAMILY MEDICINE

## 2024-10-22 PROCEDURE — 87486 CHLMYD PNEUM DNA AMP PROBE: CPT

## 2024-10-22 PROCEDURE — 84100 ASSAY OF PHOSPHORUS: CPT

## 2024-10-22 PROCEDURE — 83921 ORGANIC ACID SINGLE QUANT: CPT

## 2024-10-22 PROCEDURE — 99285 EMERGENCY DEPT VISIT HI MDM: CPT | Performed by: FAMILY MEDICINE

## 2024-10-22 PROCEDURE — 99207 PR APP CREDIT; MD BILLING SHARED VISIT: CPT

## 2024-10-22 PROCEDURE — 83605 ASSAY OF LACTIC ACID: CPT | Performed by: FAMILY MEDICINE

## 2024-10-22 PROCEDURE — 82607 VITAMIN B-12: CPT

## 2024-10-22 PROCEDURE — 85027 COMPLETE CBC AUTOMATED: CPT

## 2024-10-22 PROCEDURE — 36415 COLL VENOUS BLD VENIPUNCTURE: CPT | Performed by: FAMILY MEDICINE

## 2024-10-22 PROCEDURE — 87389 HIV-1 AG W/HIV-1&-2 AB AG IA: CPT | Performed by: FAMILY MEDICINE

## 2024-10-22 PROCEDURE — 82746 ASSAY OF FOLIC ACID SERUM: CPT

## 2024-10-22 PROCEDURE — 87086 URINE CULTURE/COLONY COUNT: CPT | Performed by: FAMILY MEDICINE

## 2024-10-22 RX ORDER — ACETAMINOPHEN 325 MG/1
325 TABLET ORAL EVERY 4 HOURS PRN
Status: DISCONTINUED | OUTPATIENT
Start: 2024-10-22 | End: 2024-10-27 | Stop reason: HOSPADM

## 2024-10-22 RX ORDER — PROCHLORPERAZINE 25 MG
25 SUPPOSITORY, RECTAL RECTAL EVERY 12 HOURS PRN
Status: DISCONTINUED | OUTPATIENT
Start: 2024-10-22 | End: 2024-10-27 | Stop reason: HOSPADM

## 2024-10-22 RX ORDER — ONDANSETRON 2 MG/ML
4 INJECTION INTRAMUSCULAR; INTRAVENOUS EVERY 6 HOURS PRN
Status: DISCONTINUED | OUTPATIENT
Start: 2024-10-22 | End: 2024-10-27 | Stop reason: HOSPADM

## 2024-10-22 RX ORDER — PROCHLORPERAZINE MALEATE 5 MG/1
10 TABLET ORAL EVERY 6 HOURS PRN
Status: DISCONTINUED | OUTPATIENT
Start: 2024-10-22 | End: 2024-10-27 | Stop reason: HOSPADM

## 2024-10-22 RX ORDER — CEFTRIAXONE 1 G/1
1 INJECTION, POWDER, FOR SOLUTION INTRAMUSCULAR; INTRAVENOUS EVERY 24 HOURS
Status: DISCONTINUED | OUTPATIENT
Start: 2024-10-22 | End: 2024-10-24

## 2024-10-22 RX ORDER — AMOXICILLIN 250 MG
2 CAPSULE ORAL 2 TIMES DAILY PRN
Status: DISCONTINUED | OUTPATIENT
Start: 2024-10-22 | End: 2024-10-27 | Stop reason: HOSPADM

## 2024-10-22 RX ORDER — ONDANSETRON 2 MG/ML
4 INJECTION INTRAMUSCULAR; INTRAVENOUS ONCE
Status: COMPLETED | OUTPATIENT
Start: 2024-10-22 | End: 2024-10-22

## 2024-10-22 RX ORDER — LIDOCAINE 40 MG/G
CREAM TOPICAL
Status: DISCONTINUED | OUTPATIENT
Start: 2024-10-22 | End: 2024-10-27 | Stop reason: HOSPADM

## 2024-10-22 RX ORDER — ACETAMINOPHEN 650 MG/1
325 SUPPOSITORY RECTAL EVERY 4 HOURS PRN
Status: DISCONTINUED | OUTPATIENT
Start: 2024-10-22 | End: 2024-10-27 | Stop reason: HOSPADM

## 2024-10-22 RX ORDER — CYANOCOBALAMIN 1000 UG/ML
1000 INJECTION, SOLUTION INTRAMUSCULAR; SUBCUTANEOUS DAILY
Status: DISCONTINUED | OUTPATIENT
Start: 2024-10-23 | End: 2024-10-27 | Stop reason: HOSPADM

## 2024-10-22 RX ORDER — ONDANSETRON 4 MG/1
4 TABLET, ORALLY DISINTEGRATING ORAL EVERY 6 HOURS PRN
Status: DISCONTINUED | OUTPATIENT
Start: 2024-10-22 | End: 2024-10-27 | Stop reason: HOSPADM

## 2024-10-22 RX ORDER — AMOXICILLIN 250 MG
1 CAPSULE ORAL 2 TIMES DAILY PRN
Status: DISCONTINUED | OUTPATIENT
Start: 2024-10-22 | End: 2024-10-27 | Stop reason: HOSPADM

## 2024-10-22 RX ADMIN — CEFTRIAXONE SODIUM 1 G: 1 INJECTION, POWDER, FOR SOLUTION INTRAMUSCULAR; INTRAVENOUS at 20:55

## 2024-10-22 RX ADMIN — PANTOPRAZOLE SODIUM 80 MG: 40 INJECTION, POWDER, FOR SOLUTION INTRAVENOUS at 22:29

## 2024-10-22 RX ADMIN — ONDANSETRON 4 MG: 2 INJECTION INTRAMUSCULAR; INTRAVENOUS at 16:15

## 2024-10-22 RX ADMIN — SODIUM CHLORIDE 1000 ML: 9 INJECTION, SOLUTION INTRAVENOUS at 16:15

## 2024-10-22 ASSESSMENT — ACTIVITIES OF DAILY LIVING (ADL)
ADLS_ACUITY_SCORE: 35

## 2024-10-22 ASSESSMENT — COLUMBIA-SUICIDE SEVERITY RATING SCALE - C-SSRS
1. IN THE PAST MONTH, HAVE YOU WISHED YOU WERE DEAD OR WISHED YOU COULD GO TO SLEEP AND NOT WAKE UP?: NO
2. HAVE YOU ACTUALLY HAD ANY THOUGHTS OF KILLING YOURSELF IN THE PAST MONTH?: NO
6. HAVE YOU EVER DONE ANYTHING, STARTED TO DO ANYTHING, OR PREPARED TO DO ANYTHING TO END YOUR LIFE?: NO

## 2024-10-22 NOTE — ED NOTES
"Mother raising her voice to daughter, speaking in English and Cypriot and then stated to daughter, \"they are going to kill you.\" They spoke further and daughter told the mother to \"shut the fuck up.\"  "

## 2024-10-22 NOTE — ED TRIAGE NOTES
Mom and patient are arguing during triage, mom kept insisting that patient needs be admitted for 2 weeks, patient yelling at mom and claims that she does not have any mental health issues. Mom is concern patient is using drugs, patient denied it.      Triage Assessment (Adult)       Row Name 10/22/24 1525          Triage Assessment    Airway WDL WDL        Respiratory WDL    Respiratory WDL WDL        Skin Circulation/Temperature WDL    Skin Circulation/Temperature WDL WDL        Cardiac WDL    Cardiac WDL WDL        Peripheral/Neurovascular WDL    Peripheral Neurovascular WDL WDL        Cognitive/Neuro/Behavioral WDL    Cognitive/Neuro/Behavioral WDL WDL

## 2024-10-22 NOTE — PROGRESS NOTES
Brief Hem/Onc Note    I was notified by the ER regarding Camelia Corea's pancytopenia that was found after she presented to the ER. She has minimal past medical history aside from multiple ER visits due to drug use and concern for overdose. She presented today due to 2-3 weeks of nausea, weakness, and lightheadedness. Denies any fevers.    On evaluation in the ER she was found to have significant pancytopenia with WBC 2.3, Hgb 7.0 (MCV 77), and Plt 35. CBC differential is pending. Current labs available equate to a Plasmic score of 3 indicating low likelihood of TTP and normal bilirubin makes hemolysis unlikely. Agree with the ER that she should be admitted for further evaluation. At this stage the etiology of her pancytopenia is broad.     Recommendations:  -Follow up differential, if evidence of blasts or large percentage of atypical cells please reach out overnight to heme fellow on call  -Obtain LDH, haptoglobin, INR, PTT, fibrinogen, uric acid  -Obtain HIV and HCV antibody screens  -Obtain iron studies, if additional concerns for nutritional deficiencies can consider B12, folate, and copper    Please consult hematology in the am to assist with workup and do not hesitate to reach out overnight if any questions regarding initial workup.     Emmy Lebron  Fellow PGY-4  Hematology and Oncology

## 2024-10-22 NOTE — ED NOTES
Patient brought to restroom and instructed on how to provide urine sample. Steady but slow gait getting into bathroom. After about 5 minutes, patient came out of restroom and stated that she missed the sample cup. The cup wasn't even wet.

## 2024-10-22 NOTE — ED PROVIDER NOTES
"       [facility] EMERGENCY DEPARTMENT ([facility])    10/22/24      ED PROVIDER NOTE   History     Chief Complaint   Patient presents with    Nausea & Vomiting     Ongoing for more than a month, mom claims that she is dehydrated    Insomnia     Mom reports patient not sleeping well, requesting mental health assessment     HPI  Camelia Corea is a 23 year old female with a past medical history of opioid use disorder who presents to the emergency department with her mother for nausea, vomiting, and insomnia.  There was a fair amount of conflict on the initial triage process patient's mother was stating that she feels as though the patient is doing drugs and was upset about the possibility of the patient not being admitted stating that she feels as though the patient is severely dehydrated and she wants her fully evaluated.  Patient was initially somewhat resistant as far as evaluation and definitely holding back on historical perspective she did not want to talk about specific concerns of any drug use.    Past Medical History  History reviewed. No pertinent past medical history.  History reviewed. No pertinent surgical history.  No current outpatient medications on file.    No Known Allergies  Family History  Family History   Problem Relation Age of Onset    Substance Abuse Sister     Substance Abuse Sister      Social History   Social History     Tobacco Use    Smoking status: Every Day     Types: Vaping Device    Smokeless tobacco: Never   Substance Use Topics    Alcohol use: No    Drug use: Not Currently     Types: Marijuana, Fentanyl     Comment: Last used 8/15/2023      A medically appropriate review of systems was performed with pertinent positives and negatives noted in the HPI, and all other systems negative.    Physical Exam   BP: 95/58  Pulse: 100  Temp: 98.9  F (37.2  C)  Resp: 16  Height: 167.6 cm (5' 6\")  Weight: 52.2 kg (115 lb)  SpO2: 96 %  Physical Exam  Constitutional:       General: She is not in " acute distress.     Appearance: Normal appearance. She is not diaphoretic.   HENT:      Head: Atraumatic.      Mouth/Throat:      Mouth: Mucous membranes are moist.   Eyes:      General: No scleral icterus.     Conjunctiva/sclera: Conjunctivae normal.   Cardiovascular:      Rate and Rhythm: Normal rate.      Heart sounds: Normal heart sounds.   Pulmonary:      Effort: No respiratory distress.      Breath sounds: Normal breath sounds.   Abdominal:      General: Abdomen is flat.   Musculoskeletal:      Cervical back: Neck supple.   Skin:     General: Skin is warm.      Findings: No rash.   Neurological:      General: No focal deficit present.      Mental Status: She is alert and oriented to person, place, and time.      Sensory: No sensory deficit.      Motor: No weakness.      Coordination: Coordination normal.   Psychiatric:         Mood and Affect: Mood is anxious.         Behavior: Behavior is cooperative.           ED Course, Procedures, & Data      Procedures       Results for orders placed or performed during the hospital encounter of 10/22/24   XR Chest 2 Views     Status: None    Narrative    XR CHEST 2 VIEWS  10/22/2024 4:27 PM       INDICATION: chest pain  COMPARISON: 9/25/2024       Impression    IMPRESSION: Negative chest.    ADALI MCCABE MD         SYSTEM ID:  [id number]   Comprehensive metabolic panel     Status: Abnormal   Result Value Ref Range    Sodium 142 135 - 145 mmol/L    Potassium 3.7 3.4 - 5.3 mmol/L    Carbon Dioxide (CO2) 21 (L) 22 - 29 mmol/L    Anion Gap 12 7 - 15 mmol/L    Urea Nitrogen 24.7 (H) 6.0 - 20.0 mg/dL    Creatinine 0.98 (H) 0.51 - 0.95 mg/dL    GFR Estimate 82 >60 mL/min/1.73m2    Calcium 8.9 8.8 - 10.4 mg/dL    Chloride 109 (H) 98 - 107 mmol/L    Glucose 111 (H) 70 - 99 mg/dL    Alkaline Phosphatase 67 40 - 150 U/L    AST 52 (H) 0 - 45 U/L    ALT 31 0 - 50 U/L    Protein Total 8.0 6.4 - 8.3 g/dL    Albumin 4.5 3.5 - 5.2 g/dL    Bilirubin Total 0.5 <=1.2 mg/dL   Lipase      Status: Abnormal   Result Value Ref Range    Lipase 96 (H) 13 - 60 U/L   Ethyl Alcohol Level     Status: Normal   Result Value Ref Range    Alcohol ethyl <0.01 <=0.01 g/dL   UA with Microscopic reflex to Culture     Status: Abnormal    Specimen: Urine, Midstream   Result Value Ref Range    Color Urine Orange (A) Colorless, Straw, Light Yellow, Yellow    Appearance Urine Cloudy (A) Clear    Glucose Urine Negative Negative mg/dL    Bilirubin Urine Negative Negative    Ketones Urine 10 (A) Negative mg/dL    Specific Gravity Urine 1.030 1.003 - 1.035    Blood Urine Large (A) Negative    pH Urine 6.0 5.0 - 7.0    Protein Albumin Urine 100 (A) Negative mg/dL    Urobilinogen Urine Normal Normal, 2.0 mg/dL    Nitrite Urine Negative Negative    Leukocyte Esterase Urine Moderate (A) Negative    WBC Clumps Urine Present (A) None Seen /HPF    Mucus Urine Present (A) None Seen /LPF    RBC Urine >182 (H) <=2 /HPF    WBC Urine 97 (H) <=5 /HPF    Narrative    Urine Culture ordered based on laboratory criteria   HCG qualitative urine (UPT)     Status: Normal   Result Value Ref Range    hCG Urine Qualitative Negative Negative   CBC with platelets and differential     Status: Abnormal   Result Value Ref Range    WBC Count 2.3 (L) 4.0 - 11.0 10e3/uL    RBC Count 2.84 (L) 3.80 - 5.20 10e6/uL    Hemoglobin 7.0 (L) 11.7 - 15.7 g/dL    Hematocrit 21.8 (L) 35.0 - 47.0 %    MCV 77 (L) 78 - 100 fL    MCH 24.6 (L) 26.5 - 33.0 pg    MCHC 32.1 31.5 - 36.5 g/dL    RDW 23.7 (H) 10.0 - 15.0 %    Platelet Count 35 (LL) 150 - 450 10e3/uL   RBC and Platelet Morphology     Status: Abnormal   Result Value Ref Range    RBC Morphology Confirmed RBC Indices     Platelet Assessment  Automated Count Confirmed. Platelet morphology is normal.     Automated Count Confirmed. Platelet morphology is normal.    RBC Fragments Rare (A) None Seen   Urine Drug Screen Panel     Status: Abnormal   Result Value Ref Range    Amphetamines Urine Screen Positive (A) Screen  Negative    Barbituates Urine Screen Negative Screen Negative    Benzodiazepine Urine Screen Negative Screen Negative    Cannabinoids Urine Screen Negative Screen Negative    Cocaine Urine Screen Negative Screen Negative    Fentanyl Qual Urine Screen Negative Screen Negative    Opiates Urine Screen Negative Screen Negative    PCP Urine Screen Negative Screen Negative   INR     Status: Abnormal   Result Value Ref Range    INR 1.16 (H) 0.85 - 1.15   Partial thromboplastin time     Status: Normal   Result Value Ref Range    aPTT 26 22 - 38 Seconds   Reticulocyte count     Status: Abnormal   Result Value Ref Range    % Reticulocyte 0.2 (L) 0.5 - 2.0 %    Absolute Reticulocyte <0.010 (L) 0.025 - 0.095 10e6/uL   Lactic acid whole blood with 1x repeat in 2 hr when >2     Status: Normal   Result Value Ref Range    Lactic Acid, Initial 1.3 0.7 - 2.0 mmol/L   Fibrinogen activity     Status: Normal   Result Value Ref Range    Fibrinogen Activity 239 170 - 510 mg/dL   Lactate Dehydrogenase     Status: Abnormal   Result Value Ref Range    Lactate Dehydrogenase 1,793 (H) 0 - 250 U/L   Manual Differential     Status: Abnormal   Result Value Ref Range    % Neutrophils 11 %    % Lymphocytes 80 %    % Monocytes 9 %    % Eosinophils 0 %    % Basophils 0 %    Absolute Neutrophils 0.3 (LL) 1.6 - 8.3 10e3/uL    Absolute Lymphocytes 1.8 0.8 - 5.3 10e3/uL    Absolute Monocytes 0.2 0.0 - 1.3 10e3/uL    Absolute Eosinophils 0.0 0.0 - 0.7 10e3/uL    Absolute Basophils 0.0 0.0 - 0.2 10e3/uL    RBC Morphology Confirmed RBC Indices     Platelet Assessment  Automated Count Confirmed. Platelet morphology is normal.     Automated Count Confirmed. Platelet morphology is normal.   Iron and iron binding capacity     Status: Abnormal   Result Value Ref Range    Iron 161 (H) 37 - 145 ug/dL    Iron Binding Capacity 208 (L) 240 - 430 ug/dL    Iron Sat Index 77 (H) 15 - 46 %   Procalcitonin     Status: Normal   Result Value Ref Range    Procalcitonin 0.25  <0.50 ng/mL   CRP inflammation     Status: Abnormal   Result Value Ref Range    CRP Inflammation 5.57 (H) <5.00 mg/L   Asymptomatic Influenza A/B, RSV, & SARS-CoV2 PCR (COVID-19) Nasopharyngeal     Status: Normal    Specimen: Nasopharyngeal; Swab   Result Value Ref Range    Influenza A PCR Negative Negative    Influenza B PCR Negative Negative    RSV PCR Negative Negative    SARS CoV2 PCR Negative Negative    Narrative    Testing was performed using the Xpert Xpress CoV2/Flu/RSV Assay on the [employer] GeneXpert Instrument. This test should be ordered for the detection of SARS-CoV-2, influenza, and RSV viruses in individuals who meet clinical and/or epidemiological criteria. Test performance is unknown in asymptomatic patients. This test is for in vitro diagnostic use under the FDA EUA for laboratories certified under CLIA to perform high or moderate complexity testing. This test has not been FDA cleared or approved. A negative result does not rule out the presence of PCR inhibitors in the specimen or target RNA in concentration below the limit of detection for the assay. If only one viral target is positive but coinfection with multiple targets is suspected, the sample should be re-tested with another FDA cleared, approved, or authorized test, if coinfection would change clinical management. This test was validated by the [facility]. These laboratories are certified under the Clinical Laboratory Improvement Amendments of 1988 (CLIA-88) as qualified to perform high complexity laboratory testing.   CBC with platelets and differential     Status: Abnormal   Result Value Ref Range    WBC Count 2.3 (L) 4.0 - 11.0 10e3/uL    RBC Count 2.61 (L) 3.80 - 5.20 10e6/uL    Hemoglobin 6.5 (LL) 11.7 - 15.7 g/dL    Hematocrit 20.0 (L) 35.0 - 47.0 %    MCV 77 (L) 78 - 100 fL    MCH 24.9 (L) 26.5 - 33.0 pg    MCHC 32.5 31.5 - 36.5 g/dL    RDW 23.9 (H) 10.0 - 15.0 %    Platelet Count 33 (LL) 150 - 450 10e3/uL    %  Neutrophils 13 %    % Lymphocytes 79 %    % Monocytes 4 %    % Eosinophils 0 %    % Basophils 0 %    % Immature Granulocytes 5 %    NRBCs per 100 WBC 0 <1 /100    Absolute Neutrophils 0.3 (LL) 1.6 - 8.3 10e3/uL    Absolute Lymphocytes 1.8 0.8 - 5.3 10e3/uL    Absolute Monocytes 0.1 0.0 - 1.3 10e3/uL    Absolute Eosinophils 0.0 0.0 - 0.7 10e3/uL    Absolute Basophils 0.0 0.0 - 0.2 10e3/uL    Absolute Immature Granulocytes 0.1 <=0.4 10e3/uL    Absolute NRBCs 0.0 10e3/uL   Reticulocyte count     Status: Abnormal   Result Value Ref Range    % Reticulocyte 0.2 (L) 0.5 - 2.0 %    Absolute Reticulocyte <0.010 (L) 0.025 - 0.095 10e6/uL   RBC and Platelet Morphology     Status: Abnormal   Result Value Ref Range    RBC Morphology Confirmed RBC Indices     Platelet Assessment  Automated Count Confirmed. Platelet morphology is normal.     Automated Count Confirmed. Platelet morphology is normal.    Elliptocytes Slight (A) None Seen    RBC Fragments Slight (A) None Seen    Teardrop Cells Slight (A) None Seen   Adult Type and Screen     Status: None   Result Value Ref Range    ABO/RH(D) O POS     Antibody Screen Negative Negative    SPECIMEN EXPIRATION DATE [id number]    CBC with platelets differential     Status: Abnormal    Narrative    The following orders were created for panel order CBC with platelets differential.  Procedure                               Abnormality         Status                     ---------                               -----------         ------                     CBC with platelets and d...[[id number]]  Abnormal            Final result               RBC and Platelet Morphology[[id number]]  Abnormal            Final result               Manual Differential[[id number]]                                                         Manual Differential[[id number]]          Abnormal            Final result                 Please view results for these tests on the individual orders.   Urine Drug Screen      Status: Abnormal    Narrative    The following orders were created for panel order Urine Drug Screen.  Procedure                               Abnormality         Status                     ---------                               -----------         ------                     Urine Drug Screen Panel[[id number]]      Abnormal            Final result                 Please view results for these tests on the individual orders.   ABO/Rh type and screen *Canceled*     Status: None ()    Narrative    The following orders were created for panel order ABO/Rh type and screen.  Procedure                               Abnormality         Status                     ---------                               -----------         ------                       Please view results for these tests on the individual orders.   ABO/Rh type and screen     Status: None    Narrative    The following orders were created for panel order ABO/Rh type and screen.  Procedure                               Abnormality         Status                     ---------                               -----------         ------                     Adult Type and Screen[[id number]]                            Final result                 Please view results for these tests on the individual orders.   Lab Blood Morphology Pathologist Review     Status: Abnormal (In process)    Narrative    The following orders were created for panel order Lab Blood Morphology Pathologist Review.  Procedure                               Abnormality         Status                     ---------                               -----------         ------                     Bld morphology pathology...[[id number]]                      In process                 CBC with platelets and d...[[id number]]  Abnormal            Final result               RBC and Platelet Morphology[[id number]]  Abnormal            Final result               Reticulocyte count[[id number]]           Abnormal             Final result               Morphology Tracking[[id number]]                              Final result                 Please view results for these tests on the individual orders.     Medications   lidocaine 1 % 0.1-1 mL (has no administration in time range)   lidocaine (LMX4) cream (has no administration in time range)   sodium chloride (PF) 0.9% PF flush 3 mL (has no administration in time range)   sodium chloride (PF) 0.9% PF flush 3 mL (has no administration in time range)   senna-docusate (SENOKOT-S/PERICOLACE) 8.6-50 MG per tablet 1 tablet (has no administration in time range)     Or   senna-docusate (SENOKOT-S/PERICOLACE) 8.6-50 MG per tablet 2 tablet (has no administration in time range)   acetaminophen (TYLENOL) tablet 325 mg (has no administration in time range)     Or   acetaminophen (TYLENOL) Suppository 325 mg (has no administration in time range)   ondansetron (ZOFRAN ODT) ODT tab 4 mg (has no administration in time range)     Or   ondansetron (ZOFRAN) injection 4 mg (has no administration in time range)   prochlorperazine (COMPAZINE) injection 10 mg (has no administration in time range)     Or   prochlorperazine (COMPAZINE) tablet 10 mg (has no administration in time range)     Or   prochlorperazine (COMPAZINE) suppository 25 mg (has no administration in time range)   cefTRIAXone (ROCEPHIN) 1 g vial to attach to  mL bag for ADULTS or NS 50 mL bag for PEDS (0 g Intravenous Stopped 10/22/24 2125)   pantoprazole (PROTONIX) IV push injection 40 mg (has no administration in time range)   cyanocobalamin injection 1,000 mcg (has no administration in time range)   sodium chloride 0.9% BOLUS 1,000 mL (0 mLs Intravenous Stopped 10/22/24 1757)   ondansetron (ZOFRAN) injection 4 mg (4 mg Intravenous $Given 10/22/24 1615)   pantoprazole (PROTONIX) IV push injection 80 mg (80 mg Intravenous $Given 10/22/24 2229)     Labs Ordered and Resulted from Time of ED Arrival to Time of ED Departure   COMPREHENSIVE  METABOLIC PANEL - Abnormal       Result Value    Sodium 142      Potassium 3.7      Carbon Dioxide (CO2) 21 (*)     Anion Gap 12      Urea Nitrogen 24.7 (*)     Creatinine 0.98 (*)     GFR Estimate 82      Calcium 8.9      Chloride 109 (*)     Glucose 111 (*)     Alkaline Phosphatase 67      AST 52 (*)     ALT 31      Protein Total 8.0      Albumin 4.5      Bilirubin Total 0.5     LIPASE - Abnormal    Lipase 96 (*)    ROUTINE UA WITH MICROSCOPIC REFLEX TO CULTURE - Abnormal    Color Urine Orange (*)     Appearance Urine Cloudy (*)     Glucose Urine Negative      Bilirubin Urine Negative      Ketones Urine 10 (*)     Specific Gravity Urine 1.030      Blood Urine Large (*)     pH Urine 6.0      Protein Albumin Urine 100 (*)     Urobilinogen Urine Normal      Nitrite Urine Negative      Leukocyte Esterase Urine Moderate (*)     WBC Clumps Urine Present (*)     Mucus Urine Present (*)     RBC Urine >182 (*)     WBC Urine 97 (*)    CBC WITH PLATELETS AND DIFFERENTIAL - Abnormal    WBC Count 2.3 (*)     RBC Count 2.84 (*)     Hemoglobin 7.0 (*)     Hematocrit 21.8 (*)     MCV 77 (*)     MCH 24.6 (*)     MCHC 32.1      RDW 23.7 (*)     Platelet Count 35 (*)    RBC AND PLATELET MORPHOLOGY - Abnormal    RBC Morphology Confirmed RBC Indices      Platelet Assessment        Value: Automated Count Confirmed. Platelet morphology is normal.    RBC Fragments Rare (*)    URINE DRUG SCREEN PANEL - Abnormal    Amphetamines Urine Screen Positive (*)     Barbituates Urine Screen Negative      Benzodiazepine Urine Screen Negative      Cannabinoids Urine Screen Negative      Cocaine Urine Screen Negative      Fentanyl Qual Urine Screen Negative      Opiates Urine Screen Negative      PCP Urine Screen Negative     INR - Abnormal    INR 1.16 (*)    RETICULOCYTE COUNT - Abnormal    % Reticulocyte 0.2 (*)     Absolute Reticulocyte <0.010 (*)    LACTATE DEHYDROGENASE - Abnormal    Lactate Dehydrogenase 1,793 (*)    MANUAL DIFFERENTIAL -  Abnormal    % Neutrophils 11      % Lymphocytes 80      % Monocytes 9      % Eosinophils 0      % Basophils 0      Absolute Neutrophils 0.3 (*)     Absolute Lymphocytes 1.8      Absolute Monocytes 0.2      Absolute Eosinophils 0.0      Absolute Basophils 0.0      RBC Morphology Confirmed RBC Indices      Platelet Assessment        Value: Automated Count Confirmed. Platelet morphology is normal.   IRON AND IRON BINDING CAPACITY - Abnormal    Iron 161 (*)     Iron Binding Capacity 208 (*)     Iron Sat Index 77 (*)    CRP INFLAMMATION - Abnormal    CRP Inflammation 5.57 (*)    CBC WITH PLATELETS AND DIFFERENTIAL - Abnormal    WBC Count 2.3 (*)     RBC Count 2.61 (*)     Hemoglobin 6.5 (*)     Hematocrit 20.0 (*)     MCV 77 (*)     MCH 24.9 (*)     MCHC 32.5      RDW 23.9 (*)     Platelet Count 33 (*)     % Neutrophils 13      % Lymphocytes 79      % Monocytes 4      % Eosinophils 0      % Basophils 0      % Immature Granulocytes 5      NRBCs per 100 WBC 0      Absolute Neutrophils 0.3 (*)     Absolute Lymphocytes 1.8      Absolute Monocytes 0.1      Absolute Eosinophils 0.0      Absolute Basophils 0.0      Absolute Immature Granulocytes 0.1      Absolute NRBCs 0.0     RETICULOCYTE COUNT - Abnormal    % Reticulocyte 0.2 (*)     Absolute Reticulocyte <0.010 (*)    RBC AND PLATELET MORPHOLOGY - Abnormal    RBC Morphology Confirmed RBC Indices      Platelet Assessment        Value: Automated Count Confirmed. Platelet morphology is normal.    Elliptocytes Slight (*)     RBC Fragments Slight (*)     Teardrop Cells Slight (*)    ETHYL ALCOHOL LEVEL - Normal    Alcohol ethyl <0.01     HCG QUALITATIVE URINE - Normal    hCG Urine Qualitative Negative     PARTIAL THROMBOPLASTIN TIME - Normal    aPTT 26     LACTIC ACID WHOLE BLOOD WITH 1X REPEAT IN 2 HR WHEN >2 - Normal    Lactic Acid, Initial 1.3     FIBRINOGEN ACTIVITY - Normal    Fibrinogen Activity 239     PROCALCITONIN - Normal    Procalcitonin 0.25     INFLUENZA A/B, RSV, &  SARS-COV2 PCR - Normal    Influenza A PCR Negative      Influenza B PCR Negative      RSV PCR Negative      SARS CoV2 PCR Negative     MORPHOLOGY TRACKING   HEPATITIS C ANTIBODY   HAPTOGLOBIN   HIV ANTIGEN ANTIBODY COMBO   PARVOVIRUS B19 DNA PCR   VITAMIN B12   FOLATE   METHYLMALONIC ACID   HEMOGLOBIN   HEMOGLOBIN   MAGNESIUM   PHOSPHORUS   URIC ACID   TYPE AND SCREEN, ADULT    ABO/RH(D) O POS      Antibody Screen Negative      SPECIMEN EXPIRATION DATE [id number]     BLOOD MORPHOLOGY PATHOLOGIST REVIEW   URINE CULTURE   RESPIRATORY PANEL PCR   ABO/RH TYPE AND SCREEN   LAB BLOOD MORPHOLOGY PATHOLOGIST REVIEW     XR Chest 2 Views   Final Result   IMPRESSION: Negative chest.      ADALI MCCABE MD            SYSTEM ID:  [id number]             Critical care was not performed.     Medical Decision Making  The patient's presentation was of high complexity (an acute health issue posing potential threat to life or bodily function).    The patient's evaluation involved:  ordering and/or review of 3+ test(s) in this encounter (see separate area of note for details)    The patient's management necessitated high risk (a decision regarding hospitalization).    Assessment & Plan        I have reviewed the nursing notes. I have reviewed the findings, diagnosis, plan and need for follow up with the patient.    Patient with pancytopenia at this time having multiple vague complaints however is at risk for having abused nitrous oxide unclear as to whether this is contributing to the patient's current pancytopenia but will need further evaluation I discussed the case with hematology as well as the medicine admit team and patient will be admitted to the medicine team with a hematology consult.    Final diagnoses:   Pancytopenia (H)   History of substance abuse (H)         [facility] EMERGENCY DEPARTMENT  10/22/2024     Leland Brady MD  10/22/24 2753

## 2024-10-23 ENCOUNTER — VIRTUAL VISIT (OUTPATIENT)
Dept: INTERPRETER SERVICES | Facility: CLINIC | Age: 24
DRG: 809 | End: 2024-10-23

## 2024-10-23 LAB
ALBUMIN SERPL BCG-MCNC: 3.6 G/DL (ref 3.5–5.2)
ALP SERPL-CCNC: 52 U/L (ref 40–150)
ALT SERPL W P-5'-P-CCNC: 22 U/L (ref 0–50)
ANION GAP SERPL CALCULATED.3IONS-SCNC: 8 MMOL/L (ref 7–15)
AST SERPL W P-5'-P-CCNC: 41 U/L (ref 0–45)
BASOPHILS # BLD AUTO: 0 10E3/UL (ref 0–0.2)
BASOPHILS NFR BLD AUTO: 0 %
BILIRUB SERPL-MCNC: 0.4 MG/DL
BLD PROD TYP BPU: NORMAL
BLD PROD TYP BPU: NORMAL
BLOOD COMPONENT TYPE: NORMAL
BLOOD COMPONENT TYPE: NORMAL
BUN SERPL-MCNC: 20 MG/DL (ref 6–20)
C PNEUM DNA SPEC QL NAA+PROBE: NOT DETECTED
CALCIUM SERPL-MCNC: 7.9 MG/DL (ref 8.8–10.4)
CHLORIDE SERPL-SCNC: 110 MMOL/L (ref 98–107)
CODING SYSTEM: NORMAL
CODING SYSTEM: NORMAL
CREAT SERPL-MCNC: 0.77 MG/DL (ref 0.51–0.95)
CROSSMATCH: NORMAL
CROSSMATCH: NORMAL
EGFRCR SERPLBLD CKD-EPI 2021: >90 ML/MIN/1.73M2
EOSINOPHIL # BLD AUTO: 0 10E3/UL (ref 0–0.7)
EOSINOPHIL NFR BLD AUTO: 0 %
ERYTHROCYTE [DISTWIDTH] IN BLOOD BY AUTOMATED COUNT: 21.2 % (ref 10–15)
FLUAV H1 2009 PAND RNA SPEC QL NAA+PROBE: NOT DETECTED
FLUAV H1 RNA SPEC QL NAA+PROBE: NOT DETECTED
FLUAV H3 RNA SPEC QL NAA+PROBE: NOT DETECTED
FLUAV RNA SPEC QL NAA+PROBE: NOT DETECTED
FLUBV RNA SPEC QL NAA+PROBE: NOT DETECTED
FOLATE SERPL-MCNC: 14.7 NG/ML (ref 4.6–34.8)
FRAGMENTS BLD QL SMEAR: ABNORMAL
GLUCOSE SERPL-MCNC: 104 MG/DL (ref 70–99)
HADV DNA SPEC QL NAA+PROBE: NOT DETECTED
HAPTOGLOB SERPL-MCNC: <10 MG/DL (ref 30–200)
HCO3 SERPL-SCNC: 22 MMOL/L (ref 22–29)
HCOV PNL SPEC NAA+PROBE: NOT DETECTED
HCT VFR BLD AUTO: 23.5 % (ref 35–47)
HGB BLD-MCNC: 7.9 G/DL (ref 11.7–15.7)
HGB BLD-MCNC: 8.1 G/DL (ref 11.7–15.7)
HGB BLD-MCNC: 8.3 G/DL (ref 11.7–15.7)
HIV 1+2 AB+HIV1 P24 AG SERPL QL IA: NONREACTIVE
HMPV RNA SPEC QL NAA+PROBE: NOT DETECTED
HPIV1 RNA SPEC QL NAA+PROBE: NOT DETECTED
HPIV2 RNA SPEC QL NAA+PROBE: NOT DETECTED
HPIV3 RNA SPEC QL NAA+PROBE: NOT DETECTED
HPIV4 RNA SPEC QL NAA+PROBE: NOT DETECTED
IMM GRANULOCYTES # BLD: 0 10E3/UL
IMM GRANULOCYTES NFR BLD: 1 %
ISSUE DATE AND TIME: NORMAL
ISSUE DATE AND TIME: NORMAL
LYMPHOCYTES # BLD AUTO: 1.9 10E3/UL (ref 0.8–5.3)
LYMPHOCYTES NFR BLD AUTO: 81 %
M PNEUMO DNA SPEC QL NAA+PROBE: NOT DETECTED
MAGNESIUM SERPL-MCNC: 2 MG/DL (ref 1.7–2.3)
MCH RBC QN AUTO: 26.6 PG (ref 26.5–33)
MCHC RBC AUTO-ENTMCNC: 33.6 G/DL (ref 31.5–36.5)
MCV RBC AUTO: 79 FL (ref 78–100)
MONOCYTES # BLD AUTO: 0.1 10E3/UL (ref 0–1.3)
MONOCYTES NFR BLD AUTO: 3 %
NEUTROPHILS # BLD AUTO: 0.3 10E3/UL (ref 1.6–8.3)
NEUTROPHILS NFR BLD AUTO: 14 %
NRBC # BLD AUTO: 0 10E3/UL
NRBC BLD AUTO-RTO: 1 /100
PATH REPORT.COMMENTS IMP SPEC: NORMAL
PATH REPORT.COMMENTS IMP SPEC: NORMAL
PATH REPORT.FINAL DX SPEC: NORMAL
PATH REPORT.MICROSCOPIC SPEC OTHER STN: NORMAL
PATH REPORT.MICROSCOPIC SPEC OTHER STN: NORMAL
PATH REPORT.RELEVANT HX SPEC: NORMAL
PHOSPHATE SERPL-MCNC: 3.9 MG/DL (ref 2.5–4.5)
PLAT MORPH BLD: ABNORMAL
PLATELET # BLD AUTO: 23 10E3/UL (ref 150–450)
POTASSIUM SERPL-SCNC: 3.9 MMOL/L (ref 3.4–5.3)
PROT SERPL-MCNC: 6.2 G/DL (ref 6.4–8.3)
RBC # BLD AUTO: 2.97 10E6/UL (ref 3.8–5.2)
RBC MORPH BLD: ABNORMAL
RSV RNA SPEC QL NAA+PROBE: NOT DETECTED
RSV RNA SPEC QL NAA+PROBE: NOT DETECTED
RV+EV RNA SPEC QL NAA+PROBE: NOT DETECTED
SODIUM SERPL-SCNC: 140 MMOL/L (ref 135–145)
UNIT ABO/RH: NORMAL
UNIT ABO/RH: NORMAL
UNIT NUMBER: NORMAL
UNIT NUMBER: NORMAL
UNIT STATUS: NORMAL
UNIT STATUS: NORMAL
UNIT TYPE ISBT: 5100
UNIT TYPE ISBT: 5100
URATE SERPL-MCNC: 6.2 MG/DL (ref 2.4–5.7)
VIT B12 SERPL-MCNC: <150 PG/ML (ref 232–1245)
WBC # BLD AUTO: 2.4 10E3/UL (ref 4–11)

## 2024-10-23 PROCEDURE — 36415 COLL VENOUS BLD VENIPUNCTURE: CPT

## 2024-10-23 PROCEDURE — 85018 HEMOGLOBIN: CPT

## 2024-10-23 PROCEDURE — 80053 COMPREHEN METABOLIC PANEL: CPT

## 2024-10-23 PROCEDURE — 120N000002 HC R&B MED SURG/OB UMMC

## 2024-10-23 PROCEDURE — 99233 SBSQ HOSP IP/OBS HIGH 50: CPT | Performed by: INTERNAL MEDICINE

## 2024-10-23 PROCEDURE — 85025 COMPLETE CBC W/AUTO DIFF WBC: CPT

## 2024-10-23 PROCEDURE — 99222 1ST HOSP IP/OBS MODERATE 55: CPT | Performed by: INTERNAL MEDICINE

## 2024-10-23 PROCEDURE — 250N000011 HC RX IP 250 OP 636

## 2024-10-23 PROCEDURE — 82728 ASSAY OF FERRITIN: CPT | Performed by: PHYSICIAN ASSISTANT

## 2024-10-23 PROCEDURE — T1013 SIGN LANG/ORAL INTERPRETER: HCPCS | Mod: GT,TEL,95

## 2024-10-23 PROCEDURE — P9016 RBC LEUKOCYTES REDUCED: HCPCS | Performed by: STUDENT IN AN ORGANIZED HEALTH CARE EDUCATION/TRAINING PROGRAM

## 2024-10-23 RX ORDER — SODIUM CHLORIDE 9 MG/ML
INJECTION, SOLUTION INTRAVENOUS
Status: COMPLETED
Start: 2024-10-23 | End: 2024-10-23

## 2024-10-23 RX ADMIN — CEFTRIAXONE SODIUM 1 G: 1 INJECTION, POWDER, FOR SOLUTION INTRAMUSCULAR; INTRAVENOUS at 20:29

## 2024-10-23 RX ADMIN — CYANOCOBALAMIN 1000 MCG: 1000 INJECTION, SOLUTION INTRAMUSCULAR; SUBCUTANEOUS at 09:16

## 2024-10-23 ASSESSMENT — ACTIVITIES OF DAILY LIVING (ADL)
ADLS_ACUITY_SCORE: 0
ADLS_ACUITY_SCORE: 35
ADLS_ACUITY_SCORE: 0

## 2024-10-23 NOTE — ED NOTES
Pt had an episode of bleeding. Per pt she is on her period. Provided pt a new brief, pad and wipes. This writer went on break. When back was informed MD Juarez and MD mcginnis was notified of the bleeding. Pt calm, resting. VSS.

## 2024-10-23 NOTE — ED NOTES
Pt was wondering if she could eat, notified MD Avitia about NPO status. Pt was given hygiene products to brush mouth, get clean.

## 2024-10-23 NOTE — ED NOTES
Pt's mother requesting that no information may be released to anyone other than herself and her daughter, being pt's sister. MD Avitia was present during this conversation. Pt's information should stay confidential. MD notified about this.

## 2024-10-23 NOTE — ED NOTES
Per ER tech's, saw pt eat some regular food given by mom. Educated mom and pt why the MD ordered clear liquid diet and encouraged to continue to follow clear liquid diet.

## 2024-10-23 NOTE — ED NOTES
"Pt stated having increased tingling in bilateral feet, more specifically underneath feet around soles. Per pt, \"feels like feet is asleep\". Neuro's intact. Notified MD. CARDOSO. No other current needs.   "

## 2024-10-23 NOTE — PROGRESS NOTES
"[facility]    Medicine Progress Note - Hospitalist Service, GOLD TEAM 18    Date of Admission:  10/22/2024    Assessment & Plan     23 year old female who presents for N/V, lightheadedness, insomnia, mental health concerns, and pancytopenia. She has a notable past medical history of polysubstance use (opiates including fentanyl, marijuana, benzodiazepines, alcohol, tobacco), opiate overdose, previously on Subxone as of 2023, aggressive behavior, and AMS.    Patient was found with pancytopenia, concern for possible GI bleed and suspected UTI.    Changes today 10/23  -Patient still at the ER.   -No acute events overnight.   -Patient received a RBC's transfusion. HGB 7.9 this morning. Waiting for Hematology consult for evaluation of pancytopenia.   -No report of new episodes of hematemesis, waiting for GI consult, probably not having a procedure today. She is feeling very hungry, I ordered clear liquid diet.     # Suspected UGIB:   # Epigastric pain, hematemesis, lightheadedness:    Presents with feeling \"unwell\" for the past month with symptoms of epigastric pain, nausea with vomiting every morning with streaks of blood. Lipase 96. Hgb 7. Of note, presented with abdominal pain/chest pain two weeks ago with Hgb of 9.4 and CT A/P with normal pancreas findings that responded well to IVF, IV protonix and Zofran. Given epigastric pain, hematemesis, lightheadedness, profound anemia decreasing since 2 weeks ago, elevated BUN, high concern for UGIB. Low concern for pancreatitis at this time, but if ongoing pain, N/V, would consider imaging.   - GI, luminal consult.   - CT A/P if ongoing pain and nausea and vomiting   - Trend CBC every 8 hours   - Protonix 80 mg IVP now, start 40 mg IVP in AM  - Cardiac montioring   - Continue monitoring HGB.      # Suspected UTI:   # Recent near pan-sensitive E.Coli UTI, treated with 5-day course of Keflex (9/26/24):    Noting dysuria upon " admission. UA as below with subjective fevers.   - Trend UCx, CBC  - Start Ceftriaxone 1 gm for 5-day course      # Pancytopenia:  Presenting with significant pancytopenia with WBC 2.3, Hgb 7.0 (MCV 77), and Plt 35. Diff without blasts or atypical cells, but absolute retic count <0.010. Bilirubin normal. LDH 1,793. Iron levels as below. CRP 5.57. Negative viral panel (Influenza, Covid-19, RSV). Normal CXR. INR 1.16, normal PTT.  Noting subjective fevers at home, as well as epigastric pain, hematemesis as above. Denies any N/T of extremities, tinnitus, gait abnormalities. Denies any productive cough,  IVDU, known HIV, family history of malignancies. Of note, recently on Keflex, but Hgb was dropping at that time, so less suspicious for drug induced pancytopenia. Per imaging, has 3.1 cm ovarian cysts, but no changes to menses. Denies any decreased intake. Nitric oxide consumption could be playing a role given profound anemia.   - Hematology consult, appreciate recommendations  - CBC daily. S/P RBC's    - Trend Hgb q8hr as above  - Work-up still in process: Peripheral smear, Haptoglobin, uric acid, HIV, HCV, B12, folate, MMA, paravirus   - Consider copper levels, CT CAP if ongoing concerns/unclear source  - Given inactivation of B12 from nitric acid, will start on B12 1,000 mcg IM   - Monitor      # Subjective fevers:   Noting subjective fevers at home, but none noted upon admission. CXR without any acute findings. Denies any productive cough. Suspected UTI as below.   - Monitor  - Treat UTI as above   - If fevers overnight, would consider broadening abx given neutropenia as below and scanning CT CAP     # Polysubstance use with possible withdrawal from unknown substance? (nitric oxide, opiates including fentanyl, marijuana, benzodiazepines, alcohol, tobacco)  # History of opiate overdose:   # Previously Subxone usage (2023):  Long history of polysubstance usage upon admission. Per discussion upon admission, only noting  "using \"Whippets\" nearly everyday. Last usage yesterday morning. Unable to tell me quantity. Denies any other substances, including amphetamines (noted on Utox). Normal alcohol ethyl level. Spoke with Poison Control at 2134 on 10/22, would recommend EKG, but given normal O2 sats, no need for ABG or further lab work-up recommended. Suspect some withdrawal from unknown toxic substance such as nitric oxide or methamphetamines. VS, alcohol levels, and history do not suggest alcohol withdrawal at this time.    - EKG, trend lytes  - Monitor neurological status q4hr      # Mental health concerns:   # Insomnia:   # History of aggressive behavior:  Mother with mental health concerns, but no PMHx of dx besides \"aggressive behavior.\" Not on any medications OP, but has been on clonidine 0.1 mg, gabapentin 300 mg,a nd trazodone 50 mg in the past. Denies SI/HI. No delusional statements.Oriented x4. Was able to state back the importance of being admitted to the hospital.  - Defer Psychiatric eval, sitter  - Monitor      # Isolated AST:   52 upon admission. Remainder of liver panel normal. Normal CT A/P as above two weeks prior to admission.  - Trend CMP in AM and remainder of work-up          Diet: Clear Liquid Diet    DVT Prophylaxis: Pneumatic Compression Devices and Anti-embolisim stockings (TEDs)  Crook Catheter: Not present  Lines: None     Cardiac Monitoring: ACTIVE order. Indication: UGIB  Code Status: Full Code      Clinically Significant Risk Factors Present on Admission          # Hyperchloremia: Highest Cl = 110 mmol/L in last 2 days, will monitor as appropriate      # Hypocalcemia: Lowest Ca = 7.9 mg/dL in last 2 days, will monitor and replace as appropriate      # Coagulation Defect: INR = 1.16 (Ref range: 0.85 - 1.15) and/or PTT = 26 Seconds (Ref range: 22 - 38 Seconds), will monitor for bleeding  # Thrombocytopenia: Lowest platelets = 23 in last 2 days, will monitor for bleeding      # Anemia: based on hgb <11        "           Disposition Plan     Medically Ready for Discharge: Anticipated in 2-4 Days             Julian Fletcher MD  Hospitalist Service, GOLD TEAM 18  [facility]  Securely message with [employer] (more info)  Text page via [employer] Paging/Directory   See signed in provider for up to date coverage information  ______________________________________________________________________    Interval History     No acute events overnight.   Patient was tired this morning.   No more episodes of hematemesis. No nausea today.     Physical Exam   Vital Signs: Temp: 97.7  F (36.5  C) Temp src: Oral BP: 111/70 Pulse: 87   Resp: 16 SpO2: 100 % O2 Device: None (Room air)    Weight: 115 lbs 0 oz    General Appearance: Alert, room air, no acute distress.   HEENT: Oral mucosa moist.    Respiratory: LS clear b/l, normal RR  Cardiovascular: S1, S2, no m/r/g  GI: BS+, all 4 quadrants, no masses, TTP in epigastric region. No rebound or guarding.   Neuropsych: Alert, oriented, moving all extremities.       Medical Decision Making       50 MINUTES SPENT BY ME on the date of service doing chart review, history, exam, documentation & further activities per the note.      Data     I have personally reviewed the following data over the past 24 hrs:    2.4 (L)  \   7.9 (L)   / 23 (LL)     140 110 (H) 20.0 /  104 (H)   3.9 22 0.77 \     ALT: 22 AST: 41 AP: 52 TBILI: 0.4   ALB: 3.6 TOT PROTEIN: 6.2 (L) LIPASE: 96 (H)     Procal: 0.25 CRP: 5.57 (H) Lactic Acid: 1.3       INR:  1.16 (H) PTT:  26   D-dimer:  N/A Fibrinogen:  239     Ferritin:  N/A % Retic:  0.2 (L) LDH:  1,793 (H)       Imaging results reviewed over the past 24 hrs:   Recent Results (from the past 24 hours)   XR Chest 2 Views    Narrative    XR CHEST 2 VIEWS  10/22/2024 4:27 PM       INDICATION: chest pain  COMPARISON: 9/25/2024       Impression    IMPRESSION: Negative chest.    ADALI MCCABE MD         SYSTEM ID:  [id number]

## 2024-10-23 NOTE — CONSULTS
GASTROENTEROLOGY CONSULTATION      Date of Admission:  10/22/2024  Requesting physician: Hayley Avitia MD           Reason for Consultation:   Concern for UGIB         ASSESSMENT AND RECOMMENDATIONS:   Assessment:  Camelia Corea is a 23-year-old female with a past medical history of pancytopenia, polysubstance abuse, and opioid use disorder (currently on Suboxone), who presents with nausea, vomiting, weakness, and lightheadedness, accompanied by multiple episodes of vomiting over the past few weeks. Gastroenterology was consulted for concerns of an upper gastrointestinal bleed.    The patient describes vomiting blood mixed with food on at least three occasions, though she denies any reflux, regurgitation, or heartburn. She has abdominal pain localized to the lower abdomen, but denies seeing blood in her stool, reporting green-colored bowel movements. The most recent vomiting episode was prior to her hospital admission.    Since admission, the patient has not experienced further hematemesis or vomiting, and denies acute GI symptoms. Lab results show significant pancytopenia, including a hemoglobin of 6.5 (down from 9.4 a month ago), leukopenia (2.4), and thrombocytopenia (23). The patient required 2 units of transfusion for anemia. Hematology has been consulted for further workup of her pancytopenia.     Given her clinical presentation, it is unlikely that her anemia is due to acute upper GI bleeding, as she is hemodynamically stable with a normal blood pressure, pulse, and a BUN of 20, which does not suggest upper GI bleeding as a primary etiology of anemia. Previous hematemesis is likely secondary to recurrent vomiting in the setting of thrombocytopenia, possibly causing esophageal tears or esophagitis. At this time, an EGD is not warranted.    #.Acute Anemia, likely central (pancytopenia, low reticulocyte count)  #Reported hematemesis (resolved)    Recommendations  - Okay to advance diet as tolerated.  -  Could consider continuing p.o. pantoprazole 40 mg bid.  - Would appreciate hematology recommendations.   - Monitor for any evidence of GI bleeding, including hematemesis or melena.  -No indication for acute GI intervention at this time.  Upper endoscopy and colonoscopy should be of the same in the outpatient setting if etiology of anemia is not identified.  To be followed by PCP.    Gastroenterology to sign off at this time    Thank you for involving us in this patient's care. Please do not hesitate to contact the GI service with any questions or concerns.     Pt care plan discussed with Dr. Catalan, GI staff physician.      Breezy Espinoza MD  Gastroenterology Fellow   Division of Gastroenterology, Hepatology and Nutrition  [facility]  Pager: [phone]   -------------------------------------------------------------------------------------------------------------------           History of Present Illness:   Camelia Corea is a 23 year old female with a past medical history of pancytopenia, polysubstance abuse, opioid use disorder on Suboxone who presents to the emergency department with her mother for nausea, vomiting, weakness, lightheadedness.  Gastroenterology was consulted for concern of upper GI bleeding.      She presented with weakness and vomiting blood over the past couple of weeks. The onset of weakness has been progressive, and the patient reports feeling generally unwell. He confirms multiple episodes of hematemesis (vomiting blood), typically occurring in the morning. The patient is uncertain about the exact number of episodes but estimates having vomited blood more than three times. He describes the vomitus as food mixed with blood, without the presence of clots. He denies experiencing reflux, regurgitation, or heartburn.    The patient also reports stomach pain localized to the lower abdomen, without being able to specify the frequency or further characteristics, noting it varies  depending on food intake. He denies seeing blood in his stool and reports green-colored bowel movements. He is unsure of the most recent episode of vomiting blood but thinks it occurred before his current hospital admission.    Since admission, the patient has not experienced any further vomiting episodes and denies any acute gastrointestinal symptoms.     The patient has no additional questions or concerns at this time.        Past Medical History:   Reviewed and edited as appropriate  History reviewed. No pertinent past medical history.         Past Surgical History:   Reviewed and edited as appropriate   History reviewed. No pertinent surgical history.         Social History:   Reviewed and edited as appropriate  Social History     Socioeconomic History    Marital status: Single     Spouse name: Not on file    Number of children: Not on file    Years of education: Not on file    Highest education level: Not on file   Occupational History    Not on file   Tobacco Use    Smoking status: Every Day     Types: Vaping Device    Smokeless tobacco: Never   Substance and Sexual Activity    Alcohol use: No    Drug use: Not Currently     Types: Marijuana, Fentanyl     Comment: Last used 8/15/2023    Sexual activity: Not on file   Other Topics Concern    Not on file   Social History Narrative    Not on file     Social Drivers of Health     Financial Resource Strain: Not on File (7/13/2021)    Received from [facility]    Financial Resource Strain     Financial Resource Strain: 0   Food Insecurity: Not on File (7/13/2021)    Received from [facility]    Food Insecurity     Food: 0   Transportation Needs: Not on File (7/13/2021)    Received from [facility]    Transportation Needs     Transportation: 0   Physical Activity: Not on File (7/13/2021)    Received from [facility]    Physical Activity     Physical Activity: 0   Stress: Not on File (7/13/2021)    Received from [facility]    Stress     Stress: 0   Social  "Connections: Not on File (2021)    Received from [facility]    Social Connections     Social Connections and Isolation: 0   Interpersonal Safety: Not on file   Housing Stability: Not on File (2021)    Received from [facility]    Housing Stability     Housin            Family History:   Reviewed and edited as appropriate  Family History   Problem Relation Age of Onset    Substance Abuse Sister     Substance Abuse Sister       No known history of colorectal cancer, liver disease, or inflammatory bowel disease.         Allergies:   Reviewed and edited as appropriate   No Known Allergies         Medications:   (Not in a hospital admission)            Review of Systems:     A complete 10 point review of systems was performed and is negative except as noted in the HPI           Physical Exam:   /70   Pulse 87   Temp 97.7  F (36.5  C) (Oral)   Resp 16   Ht 1.676 m (5' 6\")   Wt 52.2 kg (115 lb)   SpO2 100%   BMI 18.56 kg/m    Wt:   Wt Readings from Last 2 Encounters:   10/22/24 52.2 kg (115 lb)   21 55 kg (121 lb 3.2 oz)      Constitutional: No acute distress, resting comfortably in bed  Eyes: Sclera anicteric  Ears/nose/mouth/throat: Moist mucus membranes, hearing intact  Neck: supple  CV: No edema  Respiratory: Breathing comfortably on room air  Abd: Soft, mild tenderness in the hypogastrium, nondistended, bowel sounds present  Skin: warm, perfused, no jaundice  Neuro: AAO x 3  Psych: Normal affect  MSK: No gross deformities         Data:   Labs and imaging below were independently reviewed and interpreted    BMP  Recent Labs   Lab 10/23/24  0626 10/22/24  1604    142   POTASSIUM 3.9 3.7   CHLORIDE 110* 109*   LIANA 7.9* 8.9   CO2 22 21*   BUN 20.0 24.7*   CR 0.77 0.98*   * 111*     CBC  Recent Labs   Lab 10/23/24  0626 10/22/24  2319 10/22/24  2023 10/22/24  1604   WBC 2.4*  --  2.3* 2.3*   RBC 2.97*  --  2.61* 2.84*   HGB 7.9*   < > 6.5* 7.0*   HCT 23.5*  --  20.0* 21.8* "   MCV 79  --  77* 77*   MCH 26.6  --  24.9* 24.6*   MCHC 33.6  --  32.5 32.1   RDW 21.2*  --  23.9* 23.7*   PLT 23*  --  33* 35*    < > = values in this interval not displayed.     INR  Recent Labs   Lab 10/22/24  1848   INR 1.16*     LFTs  Recent Labs   Lab 10/23/24  0626 10/22/24  1604   ALKPHOS 52 67   AST 41 52*   ALT 22 31   BILITOTAL 0.4 0.5   PROTTOTAL 6.2* 8.0   ALBUMIN 3.6 4.5      MELD  MELD 3.0: 9 at 10/23/2024  6:26 AM  MELD-Na: 8 at 10/23/2024  6:26 AM  Calculated from:  Serum Creatinine: 0.77 mg/dL (Using min of 1 mg/dL) at 10/23/2024  6:26 AM  Serum Sodium: 140 mmol/L (Using max of 137 mmol/L) at 10/23/2024  6:26 AM  Total Bilirubin: 0.4 mg/dL (Using min of 1 mg/dL) at 10/23/2024  6:26 AM  Serum Albumin: 3.6 g/dL (Using max of 3.5 g/dL) at 10/23/2024  6:26 AM  INR(ratio): 1.16 at 10/22/2024  6:48 PM  Age at listing (hypothetical): 23 years  Sex: Female at 10/23/2024  6:26 AM    PANC  Recent Labs   Lab 10/22/24  1604   LIPASE 96*     Imaging: CT ABDOMEN PELVIS W CONTRAST  LOCATION: [facility]  DATE: 9/25/2024     INDICATION: Lower quadrant tender and fever  COMPARISON: 02/13/2021  TECHNIQUE: CT scan of the abdomen and pelvis was performed following injection of IV contrast. Multiplanar reformats were obtained. Dose reduction techniques were used.  CONTRAST: 74ml isovue 370     FINDINGS:   LOWER CHEST: Lung bases clear.     HEPATOBILIARY: Normal. Artifact over the liver.     PANCREAS: Normal.     SPLEEN: Normal.     ADRENAL GLANDS: Normal.     KIDNEYS/BLADDER: Normal.     BOWEL: Normal caliber. Appendix not seen with certainty. No right lower quadrant inflammation.     LYMPH NODES: Normal.     VASCULATURE: Normal.     PELVIC ORGANS: 3.1 cm left ovarian cyst. Trace of pelvic free fluid.     MUSCULOSKELETAL: Normal.                                                                      IMPRESSION:   1.  No inflammatory change, bowel obstruction or abscess.  2.   3.1 cm left ovarian cyst.     Endoscopy: NA

## 2024-10-23 NOTE — MEDICATION SCRIBE - ADMISSION MEDICATION HISTORY
Medication Scribe Admission Medication History    Admission medication history is complete. The information provided in this note is only as accurate as the sources available at the time of the update.    Information Source(s): Patient and CareEverywhere/SureScripts via in-person    Pertinent Information: Patient not taking any medication.    Changes made to PTA medication list:  Added: None  Deleted: suboxone 8-2mg, clonidine 0.1mg, gabapentin 300mg, trazodone 50mg  Changed: None    Allergies reviewed with patient and updates made in EHR: yes    Medication History Completed By: Kandice Montoya 10/22/2024 7:44 PM    No outpatient medications have been marked as taking for the 10/22/24 encounter (Hospital Encounter).

## 2024-10-23 NOTE — CONSULTS
Hematology Consult Note   Date of Service: 10/23/2024    Patient: Camelia Corea  MRN: [id number]  Admission Date: 10/22/2024  Hospital Day # Hospital Day: 2  Primary Outpatient Hematologist: None    Reason for Consult: Pancytopenia    Assessment & Plan:   Camelia Corea is a 23 year old female who presents with persistent nausea/vomiting, subjective fevers at home and concern for UGI bleeding with hematology consulted for pancytopenia. She has a past medical history of substance use and recent ED visit (9/25/24) for E. coli UTI with associated nausea/vomiting, abdominal pain, and chest pain. At that time, her Hgb was 9.4 with MCV 77, WBC 3.1 and Plt 157. She now presents with Hgb 7 and MCV 77, platelets 35, WBC 2.3, ANC 0.3. Her Hgb decreased to 5.4 overnight after initial presentation, prompting 2U PRBC transfusion with Hgb subsequently at 7.9. Her presentation is concerning for severe B12 deficiency, with B12 undetectable on admission. She also notes Whippet use near daily, which is associated with rapid and significant decrease in B12. Her elevated LDH at 1,793 and haptoglobin < 10, with total bili normal at 0.5 is likely a result of hemolysis within the bone marrow as a result of B12 deficiency. She has a low reticulocyte count with absolute retic < 0.010, % retic at 0.2%, further supporting the B12 deficiency diagnosis. Her iron is elevated at 161, TIBC 208, iron sat 77%, though ferritin has not been checked.  Workup thus far has been negative for HIV, Hep C, Flu, RSV, Covid. Parvovirus pending. Peripheral smear with low suspicion for hemolysis, more consistent with iron deficiency anemia picture, which could possibly be contributing to anemia as well. Overall, her clinical picture appears highly consistent with severe B12 deficiency, most likely due to Whippet use, with possible iron deficiency anemia as well.    Recommendations:   - Check ferritin to evaluate for possible iron deficiency anemia  - Continue B12  IM injections daily until discharge, then discharge with oral B12  - Consider IV TXA 1000mg q8 for persistent vaginal bleeding  - Recommend against additional blood transfusions and platelet transfusions given patient's age to avoid additional blood exposure    Patient was seen and plan of care was discussed with attending physician Dr. Hayes.    We will continue to follow this patient. Please don't hesitate to contact the Fellow On-Call with questions.    I spent 45 minutes in the care of this patient today, which included time necessary for preparation for the visit, obtaining history, ordering medications/tests/procedures as medically indicated, review of pertinent medical literature, counseling of the patient, communication of recommendations to the care team, and documentation time.     Kamryn Rosa, MS4  [employer]    Physician Attestation   I, Momo Hayes MD, saw this patient with the stduent and agree with the student's findings and plan of care as documented in the note.      I personally reviewed vital signs, medications, labs, and imaging.    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  No further need for transfusion at this point given risk of alloimmunization.  Continue B12 replacement as per above.  TXA for vaginal bleeding.    Momo Hayes MD  Date of Service (when I saw the patient): 10/23/24       History of Present Illness:    Camelia Corea is a 23 year old female with a past medical history of substance use including opiate overdose and recent admission 9/25/2024 found to have E. coli UTI now presenting with persistent nausea/vomiting, lightheadedness, subjective fevers at home and numbness/tingling of feet bilaterally. She has been feeling nauseated and vomiting for several weeks. The numbness and tingling in her feet  bilaterally started within the last day. She does note recent whippet use, estimating near daily use. She notes eating a diet including meat, though her mom says she does not eat very healthy foods. She also has been experiencing increased vaginal bleeding that she says is not part of her menstrual cycle.      Hematologic History:  None, no history of clots    Review of Systems: Pertinent positive and negative systems described in HPI; the remainder of the 14 systems are negative    Past Medical History:  History reviewed. No pertinent past medical history.    Past Surgical History:  History reviewed. No pertinent surgical history.    Social History:  Social History     Socioeconomic History    Marital status: Single     Spouse name: None    Number of children: None    Years of education: None    Highest education level: None   Tobacco Use    Smoking status: Every Day     Types: Vaping Device    Smokeless tobacco: Never   Substance and Sexual Activity    Alcohol use: No    Drug use: Not Currently     Types: Marijuana, Fentanyl     Comment: Last used 8/15/2023     Social Drivers of Health     Financial Resource Strain: Not on File (2021)    Received from [facility]    Financial Resource Strain     Financial Resource Strain: 0   Food Insecurity: Not on File (2021)    Received from [facility]    Food Insecurity     Food: 0   Transportation Needs: Not on File (2021)    Received from [facility]    Transportation Needs     Transportation: 0   Physical Activity: Not on File (2021)    Received from [facility]    Physical Activity     Physical Activity: 0   Stress: Not on File (2021)    Received from [facility]    Stress     Stress: 0   Social Connections: Not on File (2021)    Received from [facility]    Social Connections     Social Connections and Isolation: 0   Housing Stability: Not on File (2021)    Received from [facility]    Housing Stability     Housin         Family History  Family History   Problem Relation Age of Onset    Substance Abuse Sister     Substance Abuse Sister        Outpatient Medications:  Current Facility-Administered Medications   Medication Dose Route Frequency Provider Last Rate Last Admin    acetaminophen (TYLENOL) tablet 325 mg  325 mg Oral Q4H PRN Katherine Sylvester APRN CNP        Or    acetaminophen (TYLENOL) Suppository 325 mg  325 mg Rectal Q4H PRN Katherine Sylvester APRN CNP        cefTRIAXone (ROCEPHIN) 1 g vial to attach to  mL bag for ADULTS or NS 50 mL bag for PEDS  1 g Intravenous Q24H Katherine Sylvester APRN CNP   Stopped at 10/22/24 2125    cyanocobalamin injection 1,000 mcg  1,000 mcg Intramuscular Daily Katherine Sylvester APRN CNP        lidocaine (LMX4) cream   Topical Q1H PRN Katherine Sylvester APRN CNP        lidocaine 1 % 0.1-1 mL  0.1-1 mL Other Q1H PRN Katherine Sylvester APRN CNP        ondansetron (ZOFRAN ODT) ODT tab 4 mg  4 mg Oral Q6H PRN Katherine Sylvester APRN CNP        Or    ondansetron (ZOFRAN) injection 4 mg  4 mg Intravenous Q6H PRN Katherine Sylvester APRN CNP        pantoprazole (PROTONIX) IV push injection 40 mg  40 mg Intravenous Daily with breakfast Katherine Sylvester APRN CNP        prochlorperazine (COMPAZINE) injection 10 mg  10 mg Intravenous Q6H PRN Katherine Sylvester APRN CNP        Or    prochlorperazine (COMPAZINE) tablet 10 mg  10 mg Oral Q6H PRN Katherine Sylvester APRN CNP        Or    prochlorperazine (COMPAZINE) suppository 25 mg  25 mg Rectal Q12H PRN Katherine Sylvester APRN CNP        senna-docusate (SENOKOT-S/PERICOLACE) 8.6-50 MG per tablet 1 tablet  1 tablet Oral BID PRN Katherine Sylvester APRN CNP        Or    senna-docusate (SENOKOT-S/PERICOLACE) 8.6-50 MG per tablet 2 tablet  2 tablet Oral BID PRN Katherine Sylvester APRN CNP        sodium chloride (PF) 0.9% PF flush 3 mL  3 mL Intracatheter Q8H Katherine Sylvester, APRN CNP        sodium chloride (PF) 0.9% PF flush 3 mL  3 mL Intracatheter q1 min prn  "Katherine Sylvester, KAHLIL CNP         No current outpatient medications on file.        Physical Exam:    /78   Pulse 86   Temp 97.7  F (36.5  C) (Oral)   Resp 16   Ht 1.676 m (5' 6\")   Wt 52.2 kg (115 lb)   SpO2 100%   BMI 18.56 kg/m    Gen: Well appearing, in NAD, appears tired on exam  HEENT: EOMI  Pulm: normal work of breathing on room air  Skin: No petechiae, some bruising noted on arms  Neuro: Alert and answering questions appropriately.     Labs & Studies: I personally reviewed the following studies:  ROUTINE LABS (Last four results):  CMP  Recent Labs   Lab 10/23/24  0626 10/22/24  1604    142   POTASSIUM 3.9 3.7   CHLORIDE 110* 109*   CO2 22 21*   ANIONGAP 8 12   * 111*   BUN 20.0 24.7*   CR 0.77 0.98*   GFRESTIMATED >90 82   LIANA 7.9* 8.9   MAG  --  2.0   PHOS  --  3.9   PROTTOTAL 6.2* 8.0   ALBUMIN 3.6 4.5   BILITOTAL 0.4 0.5   ALKPHOS 52 67   AST 41 52*   ALT 22 31     CBC  Recent Labs   Lab 10/23/24  0626 10/22/24  2319 10/22/24  2023 10/22/24  1604   WBC 2.4*  --  2.3* 2.3*   RBC 2.97*  --  2.61* 2.84*   HGB 7.9* 5.4* 6.5* 7.0*   HCT 23.5*  --  20.0* 21.8*   MCV 79  --  77* 77*   MCH 26.6  --  24.9* 24.6*   MCHC 33.6  --  32.5 32.1   RDW 21.2*  --  23.9* 23.7*   PLT 23*  --  33* 35*     INR  Recent Labs   Lab 10/22/24  1848   INR 1.16*     "
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
## 2024-10-23 NOTE — ED NOTES
Pt. slept well, denies pain or nausea.  Received 2 units RBC's tolerated well.  VSS.  Pt. alert and oriented X 3, up independently to rest room during the night. Pt. remains NPO. Continue PPOC.

## 2024-10-24 ENCOUNTER — APPOINTMENT (OUTPATIENT)
Dept: CT IMAGING | Facility: CLINIC | Age: 24
DRG: 809 | End: 2024-10-24
Attending: INTERNAL MEDICINE

## 2024-10-24 LAB
BACTERIA UR CULT: NORMAL
BASOPHILS # BLD MANUAL: 0 10E3/UL (ref 0–0.2)
BASOPHILS NFR BLD MANUAL: 0 %
BURR CELLS BLD QL SMEAR: SLIGHT
ELLIPTOCYTES BLD QL SMEAR: SLIGHT
EOSINOPHIL # BLD MANUAL: 0.1 10E3/UL (ref 0–0.7)
EOSINOPHIL NFR BLD MANUAL: 2 %
ERYTHROCYTE [DISTWIDTH] IN BLOOD BY AUTOMATED COUNT: 20.8 % (ref 10–15)
FERRITIN SERPL-MCNC: 160 NG/ML (ref 6–175)
FRAGMENTS BLD QL SMEAR: SLIGHT
HCT VFR BLD AUTO: 26 % (ref 35–47)
HGB BLD-MCNC: 8.6 G/DL (ref 11.7–15.7)
HGB BLD-MCNC: 8.8 G/DL (ref 11.7–15.7)
LYMPHOCYTES # BLD MANUAL: 2.4 10E3/UL (ref 0.8–5.3)
LYMPHOCYTES NFR BLD MANUAL: 58 %
MCH RBC QN AUTO: 27.6 PG (ref 26.5–33)
MCHC RBC AUTO-ENTMCNC: 33.8 G/DL (ref 31.5–36.5)
MCV RBC AUTO: 82 FL (ref 78–100)
METHYLMALONATE SERPL-SCNC: 5.16 UMOL/L (ref 0–0.4)
MONOCYTES # BLD MANUAL: 1 10E3/UL (ref 0–1.3)
MONOCYTES NFR BLD MANUAL: 23 %
MYELOCYTES # BLD MANUAL: 0.2 10E3/UL
MYELOCYTES NFR BLD MANUAL: 6 %
NEUTROPHILS # BLD MANUAL: 0.5 10E3/UL (ref 1.6–8.3)
NEUTROPHILS NFR BLD MANUAL: 12 %
PLAT MORPH BLD: ABNORMAL
PLATELET # BLD AUTO: 29 10E3/UL (ref 150–450)
RBC # BLD AUTO: 3.19 10E6/UL (ref 3.8–5.2)
RBC MORPH BLD: ABNORMAL
WBC # BLD AUTO: 4.2 10E3/UL (ref 4–11)

## 2024-10-24 PROCEDURE — 120N000002 HC R&B MED SURG/OB UMMC

## 2024-10-24 PROCEDURE — 250N000013 HC RX MED GY IP 250 OP 250 PS 637: Performed by: STUDENT IN AN ORGANIZED HEALTH CARE EDUCATION/TRAINING PROGRAM

## 2024-10-24 PROCEDURE — 250N000013 HC RX MED GY IP 250 OP 250 PS 637: Performed by: INTERNAL MEDICINE

## 2024-10-24 PROCEDURE — 85027 COMPLETE CBC AUTOMATED: CPT

## 2024-10-24 PROCEDURE — 70450 CT HEAD/BRAIN W/O DYE: CPT | Mod: 26 | Performed by: RADIOLOGY

## 2024-10-24 PROCEDURE — 85007 BL SMEAR W/DIFF WBC COUNT: CPT | Performed by: INTERNAL MEDICINE

## 2024-10-24 PROCEDURE — 250N000009 HC RX 250

## 2024-10-24 PROCEDURE — 85018 HEMOGLOBIN: CPT | Performed by: INTERNAL MEDICINE

## 2024-10-24 PROCEDURE — 82525 ASSAY OF COPPER: CPT | Performed by: INTERNAL MEDICINE

## 2024-10-24 PROCEDURE — 36415 COLL VENOUS BLD VENIPUNCTURE: CPT

## 2024-10-24 PROCEDURE — 36415 COLL VENOUS BLD VENIPUNCTURE: CPT | Performed by: INTERNAL MEDICINE

## 2024-10-24 PROCEDURE — 250N000011 HC RX IP 250 OP 636

## 2024-10-24 PROCEDURE — 99233 SBSQ HOSP IP/OBS HIGH 50: CPT | Performed by: INTERNAL MEDICINE

## 2024-10-24 PROCEDURE — 70450 CT HEAD/BRAIN W/O DYE: CPT

## 2024-10-24 RX ORDER — LORAZEPAM 1 MG/1
1 TABLET ORAL ONCE
Status: COMPLETED | OUTPATIENT
Start: 2024-10-24 | End: 2024-10-24

## 2024-10-24 RX ORDER — GABAPENTIN 100 MG/1
100 CAPSULE ORAL 2 TIMES DAILY
Status: DISCONTINUED | OUTPATIENT
Start: 2024-10-24 | End: 2024-10-26

## 2024-10-24 RX ADMIN — GABAPENTIN 100 MG: 100 CAPSULE ORAL at 20:23

## 2024-10-24 RX ADMIN — PANTOPRAZOLE SODIUM 40 MG: 40 INJECTION, POWDER, FOR SOLUTION INTRAVENOUS at 09:12

## 2024-10-24 RX ADMIN — Medication 25 MG: at 21:50

## 2024-10-24 RX ADMIN — LORAZEPAM 1 MG: 1 TABLET ORAL at 15:35

## 2024-10-24 RX ADMIN — CYANOCOBALAMIN 1000 MCG: 1000 INJECTION, SOLUTION INTRAMUSCULAR; SUBCUTANEOUS at 09:16

## 2024-10-24 NOTE — PLAN OF CARE
8MS Admission Note 1940    Reason for admission: N/V, lightheadedness, insomnia, mental health concerns, and pancytopenia with UGIB and UTI.   Primary team notified of pt arrival: yes  Admitted from: ED  Accompanied by: Mother  Belongings: remains with patient.   Admission Required Doc Completed: Yes  Teaching: Orientation to unit and call light- call light within reach, use of console, meal times, when to call for the RN, and enforced importance of safety.  IV Access: yes  Telemetry: Yes  Ht./Wt.: Completed  Code Status verified on armband: Yes  2 RN Skin Assessment Completed with: only per writer. Declined 2 person skin check.  Suction/Ambu bag/Flowmeter at bedside: Yes    Pt status:     Temp:  [97.4  F (36.3  C)-98.9  F (37.2  C)] 97.4  F (36.3  C)  Pulse:  [] 96  Resp:  [16] 16  BP: ()/(49-78) 109/68  SpO2:  [99 %-100 %] 99 %

## 2024-10-24 NOTE — PROGRESS NOTES
Hematology  Daily Progress Note   Date of Service: 10/24/2024    Patient: Camelia Corea  MRN: [id number]  Admission Date: 10/22/2024  Hospital Day # Hospital Day: 3      Initial Reason for Consult: Pancytopenia    Assessment & Plan:   Camelia Corea is a 23 year old female who presents with persistent nausea/vomiting, subjective fevers at home and concern for UGI bleeding with hematology consulted for pancytopenia. She has a past medical history of substance use and recent ED visit (9/25/24) for E. coli UTI with associated nausea/vomiting, abdominal pain, and chest pain. At that time, her Hgb was 9.4 with MCV 77, WBC 3.1 and Plt 157. She now presents with Hgb 7 and MCV 77, platelets 35, WBC 2.3, ANC 0.3. Her Hgb decreased to 5.4 overnight after initial presentation, prompting 2U PRBC transfusion with Hgb subsequently at 7.9. Her presentation is concerning for severe B12 deficiency, with B12 undetectable on admission. She also notes Whippet use near daily, which is associated with rapid and significant decrease in B12. Her elevated LDH at 1,793 and haptoglobin < 10, with total bili normal at 0.5 is likely a result of hemolysis within the bone marrow as a result of B12 deficiency. She has a low reticulocyte count with absolute retic < 0.010, % retic at 0.2%, further supporting the B12 deficiency diagnosis. Her iron is elevated at 161, TIBC 208, iron sat 77%, and ferritin at 160 ruling out iron deficiency anemia.  Workup thus far has been negative for HIV, Hep C, Flu, RSV, Covid. Parvovirus pending. Peripheral smear with low suspicion for hemolysis. Overall, her clinical picture appears highly consistent with severe B12 deficiency, most likely due to Whippet use, warranting B12 replacement. Low suspicion for iron deficiency anemia contributing to anemia given ferritin at 160.  We will plan to see her tomorrow to discuss possible outpatient follow-up in hematology clinic.     Recommendations:   - Continue B12 IM injections  "daily until discharge, then discharge with oral B12  - Consider IV TXA 1000mg q8 for persistent vaginal bleeding  - Recommend against additional blood transfusions and platelet transfusions given patient's age to avoid additional blood exposure    Patient was not seen in person today. Plan of care was discussed with attending physician Dr. Hayes.    We will continue to follow this patient. Please don't hesitate to contact the Fellow On-Call with questions.    I spent 20 minutes in the care of this patient today, which included time necessary for preparation for the visit, obtaining history, ordering medications/tests/procedures as medically indicated, review of pertinent medical literature, counseling of the patient, communication of recommendations to the care team, and documentation time.     Kamryn Rosa, MS4  [employer] [facility] [employer]    ___________________________________________________________________    Subjective & Interval History:    No acute events noted overnight. Continues to have numbness and tingling bilaterally in hands and feet, feet are worse. She also reports some dizziness and continued vaginal bleeding. Some confusion and forgetfulness noted.      Physical Exam:    BP (!) 117/96 (BP Location: Right arm)   Pulse 91   Temp 97.8  F (36.6  C) (Oral)   Resp 18   Ht 1.676 m (5' 6\")   Wt 74.7 kg (164 lb 10.9 oz)   SpO2 100%   BMI 26.58 kg/m    Patient was not seen in person today.      Labs & Studies: I personally reviewed the following studies:  ROUTINE LABS (Last four results):  CMP  Recent Labs   Lab 10/23/24  0626 10/22/24  1604    142   POTASSIUM 3.9 3.7   CHLORIDE 110* 109*   CO2 22 21*   ANIONGAP 8 12   * 111*   BUN 20.0 24.7*   CR 0.77 0.98*   GFRESTIMATED >90 82   LIANA 7.9* 8.9   MAG  --  2.0   PHOS  --  3.9   PROTTOTAL 6.2* 8.0   ALBUMIN 3.6 4.5   BILITOTAL 0.4 0.5   ALKPHOS 52 67   AST 41 52*   ALT 22 31     CBC  Recent Labs   Lab 10/24/24  0738 " 10/23/24  2241 10/23/24  1344 10/23/24  0626 10/22/24  2319 10/22/24  2023 10/22/24  1604   WBC  --   --   --  2.4*  --  2.3* 2.3*   RBC  --   --   --  2.97*  --  2.61* 2.84*   HGB 8.6* 8.3* 8.1* 7.9*   < > 6.5* 7.0*   HCT  --   --   --  23.5*  --  20.0* 21.8*   MCV  --   --   --  79  --  77* 77*   MCH  --   --   --  26.6  --  24.9* 24.6*   MCHC  --   --   --  33.6  --  32.5 32.1   RDW  --   --   --  21.2*  --  23.9* 23.7*   PLT  --   --   --  23*  --  33* 35*    < > = values in this interval not displayed.     INR  Recent Labs   Lab 10/22/24  1848   INR 1.16*       Medications list for reference:  Current Facility-Administered Medications   Medication Dose Route Frequency Provider Last Rate Last Admin    acetaminophen (TYLENOL) tablet 325 mg  325 mg Oral Q4H PRN Katherine Sylvester APRN CNP        Or    acetaminophen (TYLENOL) Suppository 325 mg  325 mg Rectal Q4H PRN Katherine Sylvester APRN CNP        cefTRIAXone (ROCEPHIN) 1 g vial to attach to  mL bag for ADULTS or NS 50 mL bag for PEDS  1 g Intravenous Q24H Katherine Sylvester APRN CNP 0 mL/hr at 10/22/24 2125 1 g at 10/23/24 2029    cyanocobalamin injection 1,000 mcg  1,000 mcg Intramuscular Daily Katherine Sylvester APRN CNP   1,000 mcg at 10/24/24 0916    lidocaine (LMX4) cream   Topical Q1H PRN Katherine Sylvester APRN CNP        lidocaine 1 % 0.1-1 mL  0.1-1 mL Other Q1H PRN Katherine Sylvester APRN CNP        ondansetron (ZOFRAN ODT) ODT tab 4 mg  4 mg Oral Q6H PRN Katherine Sylvester APRN CNP        Or    ondansetron (ZOFRAN) injection 4 mg  4 mg Intravenous Q6H PRN Katherine Sylvester APRN CNP        pantoprazole (PROTONIX) IV push injection 40 mg  40 mg Intravenous Daily with breakfast Katherine Sylvester APRN CNP   40 mg at 10/24/24 0912    prochlorperazine (COMPAZINE) injection 10 mg  10 mg Intravenous Q6H PRN Katherine Sylvester APRN CNP        Or    prochlorperazine (COMPAZINE) tablet 10 mg  10 mg Oral Q6H PRN Katherine Sylvester APRN CNP        Or     prochlorperazine (COMPAZINE) suppository 25 mg  25 mg Rectal Q12H PRN Katherine Sylvester APRN CNP        senna-docusate (SENOKOT-S/PERICOLACE) 8.6-50 MG per tablet 1 tablet  1 tablet Oral BID PRN Katherine Sylvester APRN CNP        Or    senna-docusate (SENOKOT-S/PERICOLACE) 8.6-50 MG per tablet 2 tablet  2 tablet Oral BID PRN Katherine Sylvester APRN CNP        sodium chloride (PF) 0.9% PF flush 3 mL  3 mL Intracatheter Q8H Katherine Sylvester APRN CNP   3 mL at 10/24/24 0911    sodium chloride (PF) 0.9% PF flush 3 mL  3 mL Intracatheter q1 min prn Katherine Sylvester APRN CNP

## 2024-10-24 NOTE — PROGRESS NOTES
"@1530: Patient yelling and screaming in the room stating, \"I don't know what is wrong with my feet!\" \"Tell me what's wrong with my feet!\" \"My feet are numb\". Patient repeatedly yelling these sentences. Writer assessed patient. Patient appears extremely anxious. Denies SOB or chest pain.  MD Julian Pineda notified. One time dose of Ativan given.     @1620: Intervention effective, patient appears to be resting comfortably in bed.  "

## 2024-10-24 NOTE — PLAN OF CARE
"Goal Outcome Evaluation:      Plan of Care Reviewed With: patient      VS: /56 (BP Location: Right arm, Patient Position: Sitting)   Pulse 99   Temp 98.1  F (36.7  C) (Oral)   Resp 16   Ht 1.676 m (5' 6\")   Wt 74.7 kg (164 lb 10.9 oz)   SpO2 100%   BMI 26.58 kg/m      O2: Stable on RA, no complaints of SOB or chest pain.   Output: Voiding w/o pain or difficulty to bathroom.    Activity: SBA    Skin: Visible skin intact.   Pain: Denies pain   CMS: A&Ox4, c/o of numbness & tingling. MD aware.   Dressing: N/A   Diet: Regular. No complaints of nausea or vomiting.   LDA: L PIV, SL   Equipment: Personal belongings   Plan: Continue POC.   Additional Info: Head CT done today.  Patient had high anxiety today about numbness & tingling in her feet. See earlier note.          "

## 2024-10-24 NOTE — PLAN OF CARE
Goal Outcome Evaluation:    Plan of Care Reviewed With: patient    Overall Patient Progress: no change    Outcome Evaluation:     Neuro/CMS: A&Ox4. Numbness and tingling in hands and feet, worse in feet. Pt very concerned with this. Neuros remain intact. Patient very anxious claiming her she is going to lose her feet. Notified evening and overnight MD. No new orders.   Resp/Cardiac: Reports dizziness, no chest pain or shortness of breath.   GI/: denies N/V. Continent of bowel and bladder. Has menses and reports bleeding quite a bit. Notified MD of heavy bleeding.   Skin: Some brown spots on back, otherwise intact.   Activity: Standby assist due to dizziness.   Pain: Denies.   LDA: Left PIV saline locked.  Diet: Full liquid diet.   Other: Mother at bedside.     Pt mother reported to writer (patient in room) that a week ago patient took all the pills from a large bottle of melatonin 10mg in 2-3 days. Inquired if this would cause any symptoms the patient is having today. Notified MD.     Plan: Continue to monitor for bleeding. Hgb checks q8h. Pending hematology consult. Continue with POC.

## 2024-10-24 NOTE — PROGRESS NOTES
"[facility]    Medicine Progress Note - Hospitalist Service, GOLD TEAM 18    Date of Admission:  10/22/2024    Assessment & Plan   23 year old female who presents for N/V, lightheadedness, insomnia, mental health concerns, and pancytopenia. She has a notable past medical history of polysubstance use (opiates including fentanyl, marijuana, benzodiazepines, alcohol, tobacco), opiate overdose, previously on Subxone as of 2023, aggressive behavior, and AMS.    Patient was found with pancytopenia, concern for possible GI bleed and suspected UTI.    Changes today 10/24  -No acute events overnight. She was admitted to the medical floor.  -Today the patient reports bilateral LE numbness.  -Patient is showing erratic behavior today, she was playing with her phone when is was in the room and kept asking me the same questions multiple times. I ordered a CT head to r/o acute intracranial process in the context of pancytopenia.   -Patient was evaluated by GI. No more episodes of hematemesis. Diet advanced.        # Suspected UGIB - Resolved.   # Epigastric pain, hematemesis, lightheadedness - Resolved.   Presents with feeling \"unwell\" for the past month with symptoms of epigastric pain, nausea with vomiting every morning with streaks of blood. Lipase 96. Hgb 7. Of note, presented with abdominal pain/chest pain two weeks ago with Hgb of 9.4 and CT A/P with normal pancreas findings that responded well to IVF, IV protonix and Zofran. Given epigastric pain, hematemesis, lightheadedness, profound anemia decreasing since 2 weeks ago, elevated BUN, high concern for UGIB. Low concern for pancreatitis at this time, but if ongoing pain, N/V, would consider imaging.     GI recommendations   Recommendations  - Okay to advance diet as tolerated.  - Could consider continuing p.o. pantoprazole 40 mg bid.  - Would appreciate hematology recommendations.   - Monitor for any evidence of GI bleeding, " "including hematemesis or melena.  - No indication for acute GI intervention at this time.  Upper endoscopy and colonoscopy should be of the same in the outpatient setting if etiology of anemia is not identified.  To be followed by PCP.    - HGB stable.      # Suspected UTI:   # Recent near pan-sensitive E.Coli UTI, treated with 5-day course of Keflex (9/26/24):    - Ucx negative. Stop Ceftriaxone.      # Pancytopenia:  # Severe vit B 12 deficiency   # Whippet abuse   - Hematology evaluated the patient  - Continue B12 IM injections daily until discharge, then discharge with oral B12  - Consider IV TXA 1000mg q8 for persistent vaginal bleeding  - Recommend against additional blood transfusions and platelet transfusions given patient's age to avoid additional blood exposure     # Subjective fevers:   - Resolved. Continue monitoring.      # Polysubstance use with possible withdrawal from unknown substance? (nitric oxide, opiates including fentanyl, marijuana, benzodiazepines, alcohol, tobacco)  # History of opiate overdose:   # Previously Subxone usage (2023):  Long history of polysubstance usage upon admission. Per discussion upon admission, only noting using \"Whippets\" nearly everyday. Last usage yesterday morning. Unable to tell me quantity. Denies any other substances, including amphetamines (noted on Utox). Normal alcohol ethyl level. Spoke with Poison Control at 2134 on 10/22, would recommend EKG, but given normal O2 sats, no need for ABG or further lab work-up recommended. Suspect some withdrawal from unknown toxic substance such as nitric oxide or methamphetamines. VS, alcohol levels, and history do not suggest alcohol withdrawal at this time.    - EKG, trend lytes  - Monitor neurological status q4hr      # Mental health concerns:   # Insomnia:   # History of aggressive behavior:  Mother with mental health concerns, but no PMHx of dx besides \"aggressive behavior.\" Not on any medications OP, but has been on " "clonidine 0.1 mg, gabapentin 300 mg,a nd trazodone 50 mg in the past. Denies SI/HI. No delusional statements.Oriented x4. Was able to state back the importance of being admitted to the hospital.  - Due to erratic behavior and hx of substance abuse I'll place a Psych consult.             Diet: Regular Diet Adult    DVT Prophylaxis: Pneumatic Compression Devices and Anti-embolisim stockings (TEDs)  Crook Catheter: Not present  Lines: None     Cardiac Monitoring: None  Code Status: Full Code      Clinically Significant Risk Factors          # Hyperchloremia: Highest Cl = 110 mmol/L in last 2 days, will monitor as appropriate      # Hypocalcemia: Lowest Ca = 7.9 mg/dL in last 2 days, will monitor and replace as appropriate        # Coagulation Defect: INR = 1.16 (Ref range: 0.85 - 1.15) and/or PTT = 26 Seconds (Ref range: 22 - 38 Seconds), will monitor for bleeding  # Thrombocytopenia: Lowest platelets = 23 in last 2 days, will monitor for bleeding             # Overweight: Estimated body mass index is 26.58 kg/m  as calculated from the following:    Height as of this encounter: 1.676 m (5' 6\").    Weight as of this encounter: 74.7 kg (164 lb 10.9 oz)., PRESENT ON ADMISSION            Disposition Plan     Medically Ready for Discharge: Anticipated in 2-4 Days             Julian Fletcher MD  Hospitalist Service, GOLD TEAM 18  [facility]  Securely message with [employer] (more info)  Text page via [facility] Paging/Directory   See signed in provider for up to date coverage information  ______________________________________________________________________    Interval History     Acute events as above    Physical Exam   Vital Signs: Temp: 97.8  F (36.6  C) Temp src: Oral BP: (!) 117/96 Pulse: 91   Resp: 18 SpO2: 100 % O2 Device: None (Room air)    Weight: 164 lbs 10.94 oz    General Appearance: Alert, room air, no acute distress.   HEENT: Oral mucosa moist.    Respiratory: LS clear " b/l, normal RR  Cardiovascular: S1, S2, no m/r/g  GI: BS+, all 4 quadrants, no masses, TTP in epigastric region. No rebound or guarding.   Neuropsych: Alert, forgetful, appears confused. Moving all extremities. Reports bilateral LE numbness.       Medical Decision Making       50 MINUTES SPENT BY ME on the date of service doing chart review, history, exam, documentation & further activities per the note.      Data     I have personally reviewed the following data over the past 24 hrs:    N/A  \   8.6 (L)   / N/A     N/A N/A N/A /  N/A   N/A N/A N/A \     Ferritin:  N/A % Retic:  N/A LDH:  N/A       Imaging results reviewed over the past 24 hrs:   No results found for this or any previous visit (from the past 24 hours).

## 2024-10-25 PROCEDURE — 99254 IP/OBS CNSLTJ NEW/EST MOD 60: CPT | Performed by: NURSE PRACTITIONER

## 2024-10-25 PROCEDURE — 250N000013 HC RX MED GY IP 250 OP 250 PS 637: Performed by: STUDENT IN AN ORGANIZED HEALTH CARE EDUCATION/TRAINING PROGRAM

## 2024-10-25 PROCEDURE — 250N000013 HC RX MED GY IP 250 OP 250 PS 637: Performed by: INTERNAL MEDICINE

## 2024-10-25 PROCEDURE — 99232 SBSQ HOSP IP/OBS MODERATE 35: CPT | Performed by: INTERNAL MEDICINE

## 2024-10-25 PROCEDURE — 250N000011 HC RX IP 250 OP 636

## 2024-10-25 PROCEDURE — 93010 ELECTROCARDIOGRAM REPORT: CPT | Performed by: INTERNAL MEDICINE

## 2024-10-25 PROCEDURE — 120N000002 HC R&B MED SURG/OB UMMC

## 2024-10-25 PROCEDURE — 93005 ELECTROCARDIOGRAM TRACING: CPT

## 2024-10-25 PROCEDURE — 250N000013 HC RX MED GY IP 250 OP 250 PS 637: Performed by: NURSE PRACTITIONER

## 2024-10-25 PROCEDURE — 250N000009 HC RX 250

## 2024-10-25 RX ORDER — HYDROXYZINE HYDROCHLORIDE 25 MG/1
25 TABLET, FILM COATED ORAL EVERY 6 HOURS PRN
Status: DISCONTINUED | OUTPATIENT
Start: 2024-10-25 | End: 2024-10-27 | Stop reason: HOSPADM

## 2024-10-25 RX ORDER — TRAZODONE HYDROCHLORIDE 50 MG/1
50 TABLET, FILM COATED ORAL
Status: DISCONTINUED | OUTPATIENT
Start: 2024-10-25 | End: 2024-10-27 | Stop reason: HOSPADM

## 2024-10-25 RX ADMIN — Medication 5 MG: at 20:32

## 2024-10-25 RX ADMIN — CYANOCOBALAMIN 1000 MCG: 1000 INJECTION, SOLUTION INTRAMUSCULAR; SUBCUTANEOUS at 07:56

## 2024-10-25 RX ADMIN — GABAPENTIN 100 MG: 100 CAPSULE ORAL at 20:32

## 2024-10-25 RX ADMIN — GABAPENTIN 100 MG: 100 CAPSULE ORAL at 07:56

## 2024-10-25 RX ADMIN — Medication 5 MG: at 01:50

## 2024-10-25 RX ADMIN — TRAZODONE HYDROCHLORIDE 50 MG: 50 TABLET ORAL at 23:55

## 2024-10-25 RX ADMIN — PANTOPRAZOLE SODIUM 40 MG: 40 INJECTION, POWDER, FOR SOLUTION INTRAVENOUS at 07:57

## 2024-10-25 ASSESSMENT — ACTIVITIES OF DAILY LIVING (ADL)
ADLS_ACUITY_SCORE: 0
DEPENDENT_IADLS:: INDEPENDENT
ADLS_ACUITY_SCORE: 0

## 2024-10-25 NOTE — PLAN OF CARE
Goal Outcome Evaluation:    Plan of Care Reviewed With: patient    Overall Patient Progress: improving    Outcome Evaluation:     Neuro/CMS: Continues to have numbness and tingling in BLE. Patient much less anxious about it today. A&Ox4 - noted pt would repeatedly asking same questions throughout the shift  Resp/Cardiac: Denies SOB, chest pain, dizziness.   GI/: Continent. On menses. Denies N/V.   Skin: intact.   Activity: Standby assist.   Pain: Denies.   LDA: Left PIV saline locked.   Diet: Regular.   Other: Overall patient improving compared from yesterday. Much less anxiety. Mother not at bedside overnight. PRN trazodone and melatonin given for sleep.     Plan: Continue with POC.

## 2024-10-25 NOTE — PROGRESS NOTES
Hematology  Daily Progress Note   Date of Service: 10/25/2024    Patient: Camelia Corea  MRN: [id number]  Admission Date: 10/22/2024  Hospital Day # Hospital Day: 4      Initial Reason for Consult: Pancytopenia    Assessment & Plan:   Camelia Corea is a 23 year old female who presents with persistent nausea/vomiting, subjective fevers at home and concern for UGI bleeding with hematology consulted for pancytopenia. She has a past medical history of substance use and recent ED visit (9/25/24) for E. coli UTI with associated nausea/vomiting, abdominal pain, and chest pain. At that time, her Hgb was 9.4 with MCV 77, WBC 3.1 and Plt 157. She now presents with Hgb 7 and MCV 77, platelets 35, WBC 2.3, ANC 0.3. Her Hgb decreased to 5.4 overnight after initial presentation, prompting 2U PRBC transfusion with Hgb subsequently at 7.9. Her presentation is concerning for severe B12 deficiency, with B12 undetectable on admission. She also notes Whippet use near daily, which is associated with rapid and significant decrease in B12. Her elevated LDH at 1,793 and haptoglobin < 10, with total bili normal at 0.5 is likely a result of hemolysis within the bone marrow as a result of B12 deficiency. She has a low reticulocyte count with absolute retic < 0.010, retic at 0.2%, further supporting the B12 deficiency diagnosis. Her iron is elevated at 161, TIBC 208, iron sat 77%, and ferritin at 160 ruling out iron deficiency anemia.  Workup thus far has been negative for HIV, Hep C, Flu, RSV, Covid. Peripheral smear with low suspicion for hemolysis. Overall, her clinical picture appears highly consistent with severe B12 deficiency, most likely due to Whippet use, warranting B12 replacement. Low suspicion for iron deficiency anemia contributing to anemia given ferritin at 160.       Recommendations:   - Continue B12 IM injections daily until discharge, then discharge with oral B12  - Consider IV TXA 1000mg q8 for persistent vaginal bleeding  -  "Recommend against additional blood transfusions and platelet transfusions given patient's age to avoid additional blood exposure  - Recommend follow-up with PCP in 1-2 months to ensure CBC has stabilized after initiating B12 treatment    Plan of care was discussed with attending physician Dr. Hayes.    We will sign off on this patient. Please don't hesitate to contact the Fellow On-Call with questions.    I spent 25 minutes in the care of this patient today, which included time necessary for preparation for the visit, obtaining history, ordering medications/tests/procedures as medically indicated, review of pertinent medical literature, counseling of the patient, communication of recommendations to the care team, and documentation time.     Kamryn Rosa, MS4  [employer] [facility] [employer]    ___________________________________________________________________    Subjective & Interval History:    No acute events noted overnight. Per nursing notes, continues to have bilateral numbness/tingling in lower extremities though she is less anxious about this today. Appears to be improving today with less confusion and anxiety.      Physical Exam:    BP 98/52 (BP Location: Right arm)   Pulse 89   Temp 98.2  F (36.8  C) (Oral)   Resp 16   Ht 1.676 m (5' 6\")   Wt 74.7 kg (164 lb 10.9 oz)   SpO2 100%   BMI 26.58 kg/m    Patient not seen in person today    Labs & Studies: I personally reviewed the following studies:  ROUTINE LABS (Last four results):  CMP  Recent Labs   Lab 10/23/24  0626 10/22/24  1604    142   POTASSIUM 3.9 3.7   CHLORIDE 110* 109*   CO2 22 21*   ANIONGAP 8 12   * 111*   BUN 20.0 24.7*   CR 0.77 0.98*   GFRESTIMATED >90 82   LIANA 7.9* 8.9   MAG  --  2.0   PHOS  --  3.9   PROTTOTAL 6.2* 8.0   ALBUMIN 3.6 4.5   BILITOTAL 0.4 0.5   ALKPHOS 52 67   AST 41 52*   ALT 22 31     CBC  Recent Labs   Lab 10/24/24  1340 10/24/24  0738 10/23/24  2241 10/23/24  1344 10/23/24  0626 " 10/22/24  2319 10/22/24  2023 10/22/24  1604   WBC 4.2  --   --   --  2.4*  --  2.3* 2.3*   RBC 3.19*  --   --   --  2.97*  --  2.61* 2.84*   HGB 8.8* 8.6* 8.3* 8.1* 7.9*   < > 6.5* 7.0*   HCT 26.0*  --   --   --  23.5*  --  20.0* 21.8*   MCV 82  --   --   --  79  --  77* 77*   MCH 27.6  --   --   --  26.6  --  24.9* 24.6*   MCHC 33.8  --   --   --  33.6  --  32.5 32.1   RDW 20.8*  --   --   --  21.2*  --  23.9* 23.7*   PLT 29*  --   --   --  23*  --  33* 35*    < > = values in this interval not displayed.     INR  Recent Labs   Lab 10/22/24  1848   INR 1.16*       Medications list for reference:  Current Facility-Administered Medications   Medication Dose Route Frequency Provider Last Rate Last Admin    acetaminophen (TYLENOL) tablet 325 mg  325 mg Oral Q4H PRN Katherine Sylvester APRN CNP        Or    acetaminophen (TYLENOL) Suppository 325 mg  325 mg Rectal Q4H PRN Katherine Sylvester APRN CNP        cyanocobalamin injection 1,000 mcg  1,000 mcg Intramuscular Daily Katherine Sylvester APRN CNP   1,000 mcg at 10/25/24 0756    gabapentin (NEURONTIN) capsule 100 mg  100 mg Oral BID Julian Fletcher MD   100 mg at 10/25/24 0756    lidocaine (LMX4) cream   Topical Q1H PRN Katherine Sylvester APRN CNP        lidocaine 1 % 0.1-1 mL  0.1-1 mL Other Q1H PRN Katherine Sylvester APRN CNP        melatonin tablet 5 mg  5 mg Oral At Bedtime PRN Dougie Adams MD   5 mg at 10/25/24 0150    ondansetron (ZOFRAN ODT) ODT tab 4 mg  4 mg Oral Q6H PRN Katherine Sylvester APRN CNP        Or    ondansetron (ZOFRAN) injection 4 mg  4 mg Intravenous Q6H PRN Katherine Sylvester APRN CNP        pantoprazole (PROTONIX) IV push injection 40 mg  40 mg Intravenous Daily with breakfast Katherine Sylvester APRN CNP   40 mg at 10/25/24 0757    prochlorperazine (COMPAZINE) injection 10 mg  10 mg Intravenous Q6H PRN Katherine Sylvester APRN CNP        Or    prochlorperazine (COMPAZINE) tablet 10 mg  10 mg Oral Q6H PRN Katherine Sylvester APRN CNP         Or    prochlorperazine (COMPAZINE) suppository 25 mg  25 mg Rectal Q12H PRN Katherine Sylvester APRN CNP        senna-docusate (SENOKOT-S/PERICOLACE) 8.6-50 MG per tablet 1 tablet  1 tablet Oral BID PRN Katherine Sylvester APRN CNP        Or    senna-docusate (SENOKOT-S/PERICOLACE) 8.6-50 MG per tablet 2 tablet  2 tablet Oral BID PRN Katherine Sylvester APRN CNP        sodium chloride (PF) 0.9% PF flush 3 mL  3 mL Intracatheter Q8H Katherine Sylvester APRN CNP   3 mL at 10/25/24 0813    sodium chloride (PF) 0.9% PF flush 3 mL  3 mL Intracatheter q1 min prn Katherine Sylvester APRN CNP   3 mL at 10/25/24 0813

## 2024-10-25 NOTE — CONSULTS
[facility], [place]   Psychiatric Consult   Consult date: 10/25/2024        Reason for Consult:   Management of her mental health and addiction symptoms and medications.        HPI:     Camelia Corea is a 23 year old female who presents for N/V, lightheadedness, insomnia, mental health concerns, and pancytopenia. She has a notable past medical history of polysubstance use (opiates including fentanyl, marijuana, benzodiazepines, alcohol, tobacco), opiate overdose, previously on Subxone as of 2023, aggressive behavior, and AMS.     Camelia Corea 23 years old [place] female with prior history of polysubstance abuse, insomnia, and aggressive behavior.  The patient is a good historian.  She is not interested in discussing her mental health and addiction.  She only wants to know when she will be able to walk again.  She is complaining of pins-and-needles in her feet, weakness, and inability to walk.  She finally agreed to talk about her mental health and addiction history.  Reports she has never been depressed prior to getting sick few days ago.  She has never been on medications for mental illness.  Currently denies feeling suicidal homicidal.  Her sleep is good.  Energy is low.  Denies problems with memory and concentration.  Appetite is intact.  Anxiety is only related to her inability to walk.  Denies prior history of odilia, psychosis, PTSD, OCD, eating disorders, borderline personality disorder, suicide attempts, self-injury behaviors.  She has never taken medication for mental illness.    The patient reports history of polysubstance abuse.  She started using opiates at age 17.  She has been clean for about a year now.  She used to take Suboxone but not anymore.  Per chart review, the patient was seen in the recovery clinic by Dr. Paola Hurley.  She had Suboxone 16 mg a day for opiate dependency, clonidine, and gabapentin as needed.  The patient reports that that she never followed up after  "that.  She does not drink alcohol or smoke cigarettes.  Reports a history of abusing cannabis but not in the last year.  She has never abused benzodiazepine.  Currently reports using whippets \"INFUZD N2O\".  She started using it about a month ago.  States it makes her sleepy.  Denies hallucinations, paranoia, delusions with it.  Denies history of seizures and DTs.  The patient has never been in detox or rehab.    Spoke with patient's mother.  Initially stated that the patient has been having mental health issues but later admitted that it is not true.  She lied about it because she wanted the patient to get help with her addiction.  The patient has been using drugs for several years.  She would like her to get into an IOP program.        Past Psychiatric History:     No past psychiatric history of medications.        Substance Use and History:   See HPI.        Past Medical History:   PAST MEDICAL HISTORY: History reviewed. No pertinent past medical history.    PAST SURGICAL HISTORY: History reviewed. No pertinent surgical history.          Family History:   FAMILY HISTORY:   Family History   Problem Relation Age of Onset    Substance Abuse Sister     Substance Abuse Sister      No history of mental illness or chemical dependency.        Social History:   SOCIAL HISTORY:   Social History     Tobacco Use    Smoking status: Every Day     Types: Vaping Device    Smokeless tobacco: Never   Substance Use Topics    Alcohol use: No   The patient was born here.  She has brothers and sisters.  Parents are alive.  She lives with her mom.  Completed high school.  She is not working.  Denies  and legal history.  She has never been  and has no children.         PTA Medications:     No medications prior to admission.          Allergies:   No Known Allergies       Labs:     Recent Results (from the past 48 hours)   Hemoglobin    Collection Time: 10/23/24  1:44 PM   Result Value Ref Range    Hemoglobin 8.1 (L) 11.7 - " "15.7 g/dL   Hemoglobin    Collection Time: 10/23/24 10:41 PM   Result Value Ref Range    Hemoglobin 8.3 (L) 11.7 - 15.7 g/dL   Hemoglobin    Collection Time: 10/24/24  7:38 AM   Result Value Ref Range    Hemoglobin 8.6 (L) 11.7 - 15.7 g/dL   CBC with platelets and differential    Collection Time: 10/24/24  1:40 PM   Result Value Ref Range    WBC Count 4.2 4.0 - 11.0 10e3/uL    RBC Count 3.19 (L) 3.80 - 5.20 10e6/uL    Hemoglobin 8.8 (L) 11.7 - 15.7 g/dL    Hematocrit 26.0 (L) 35.0 - 47.0 %    MCV 82 78 - 100 fL    MCH 27.6 26.5 - 33.0 pg    MCHC 33.8 31.5 - 36.5 g/dL    RDW 20.8 (H) 10.0 - 15.0 %    Platelet Count 29 (LL) 150 - 450 10e3/uL   Manual Differential    Collection Time: 10/24/24  1:40 PM   Result Value Ref Range    % Neutrophils 12 %    % Lymphocytes 58 %    % Monocytes 23 %    % Eosinophils 2 %    % Basophils 0 %    % Myelocytes 6 %    Absolute Neutrophils 0.5 (L) 1.6 - 8.3 10e3/uL    Absolute Lymphocytes 2.4 0.8 - 5.3 10e3/uL    Absolute Monocytes 1.0 0.0 - 1.3 10e3/uL    Absolute Eosinophils 0.1 0.0 - 0.7 10e3/uL    Absolute Basophils 0.0 0.0 - 0.2 10e3/uL    Absolute Myelocytes 0.2 (H) <=0.0 10e3/uL    RBC Morphology Confirmed RBC Indices     Platelet Assessment  Automated Count Confirmed. Platelet morphology is normal.     Automated Count Confirmed. Platelet morphology is normal.    [place] Cells Slight (A) None Seen    Elliptocytes Slight (A) None Seen    RBC Fragments Slight (A) None Seen          Physical and Psychiatric Examination:     BP 98/52 (BP Location: Right arm)   Pulse 89   Temp 98.2  F (36.8  C) (Oral)   Resp 16   Ht 1.676 m (5' 6\")   Wt 74.7 kg (164 lb 10.9 oz)   SpO2 100%   BMI 26.58 kg/m    Weight is 164 lbs 10.94 oz  Body mass index is 26.58 kg/m .    Mental Status Exam:  Appearance: awake, alert and adequately groomed  Attitude:  cooperative  Eye Contact:  good  Mood:  anxious, depressed, and related to her inability to walk  Affect:  mood congruent  Speech:  clear, " coherent  Language: fluent and intact in English  Psychomotor, Gait, Musculoskeletal:  no evidence of tardive dyskinesia, dystonia, or tics  Thought Process:  logical and goal oriented  Associations:  no loose associations  Thought Content:  no evidence of suicidal ideation or homicidal ideation, no auditory hallucinations present, and no visual hallucinations present  Insight:  fair  Judgement:  fair  Oriented to:  time, person, and place  Attention Span and Concentration:  intact  Recent and Remote Memory:  intact  Fund of Knowledge:  appropriate         Diagnoses:   Inhalant Use disorder (Nitrous Oxide)  Opiate use disorder, in sustained remission  Cannabis use disorder, in sustained remission  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  Currently reports mild depression and anxiety mainly related to her medical problems.  She is denying everything else.  Per patient's mom, the patient has no history of mental illness or medications however, the main concern is polysubstance abuse.    At this point, the patient is not interested in psychiatric medications.  As needed hydroxyzine and trazodone can be used for anxiety or sleep.    The patient is not interested in rehab however, will consider IOP program.    Psychiatry service will sign off. Please re-consult if additional questions arise or if the patent requests to meet with the team again.

## 2024-10-25 NOTE — PROGRESS NOTES
"[facility]    Medicine Progress Note - Hospitalist Service, GOLD TEAM 18    Date of Admission:  10/22/2024    Assessment & Plan   23 year old female who presents for N/V, lightheadedness, insomnia, mental health concerns, and pancytopenia. She has a notable past medical history of polysubstance use (opiates including fentanyl, marijuana, benzodiazepines, alcohol, tobacco), opiate overdose, previously on Subxone as of 2023, aggressive behavior, and AMS.    Patient was found with pancytopenia, concern for possible GI bleed and suspected UTI.    Changes today 10/25  -No acute events overnight.  -CT head negative.   -Confusion is slowly improving today.   -Patient was started on Neurontin for neuropathy.   -Patient continues with bilateral LE numbness.   -Vaginal bleeding is slowing down.   -CBC stable.       # Suspected UGIB - Resolved.   # Epigastric pain, hematemesis, lightheadedness - Resolved.   Presents with feeling \"unwell\" for the past month with symptoms of epigastric pain, nausea with vomiting every morning with streaks of blood. Lipase 96. Hgb 7. Of note, presented with abdominal pain/chest pain two weeks ago with Hgb of 9.4 and CT A/P with normal pancreas findings that responded well to IVF, IV protonix and Zofran. Given epigastric pain, hematemesis, lightheadedness, profound anemia decreasing since 2 weeks ago, elevated BUN, high concern for UGIB. Low concern for pancreatitis at this time, but if ongoing pain, N/V, would consider imaging.     GI recommendations   Recommendations  - Okay to advance diet as tolerated.  - Could consider continuing p.o. pantoprazole 40 mg bid.  - Would appreciate hematology recommendations.   - Monitor for any evidence of GI bleeding, including hematemesis or melena.  - No indication for acute GI intervention at this time.  Upper endoscopy and colonoscopy should be of the same in the outpatient setting if etiology of anemia is not " "identified.  To be followed by PCP.    - HGB stable.      # Suspected UTI:   # Recent near pan-sensitive E.Coli UTI, treated with 5-day course of Keflex (9/26/24):    - Ucx negative. Stop Ceftriaxone.      # Pancytopenia:  # Severe vit B 12 deficiency   # Whippet abuse   - Hematology evaluated the patient  - Continue B12 IM injections daily until discharge, then discharge with oral B12  - Consider IV TXA 1000mg q8 for persistent vaginal bleeding  - Recommend against additional blood transfusions and platelet transfusions given patient's age to avoid additional blood exposure     # Subjective fevers:   - Resolved. Continue monitoring.      # Polysubstance use with possible withdrawal from unknown substance? (nitric oxide, opiates including fentanyl, marijuana, benzodiazepines, alcohol, tobacco)  # History of opiate overdose:   # Previously Subxone usage (2023):  Long history of polysubstance usage upon admission. Per discussion upon admission, only noting using \"Whippets\" nearly everyday. Last usage yesterday morning. Unable to tell me quantity. Denies any other substances, including amphetamines (noted on Utox). Normal alcohol ethyl level. Spoke with Poison Control at 2134 on 10/22, would recommend EKG, but given normal O2 sats, no need for ABG or further lab work-up recommended. Suspect some withdrawal from unknown toxic substance such as nitric oxide or methamphetamines. VS, alcohol levels, and history do not suggest alcohol withdrawal at this time.    - EKG, trend lytes  - Monitor neurological status q4hr      # Mental health concerns:   # Insomnia:   # History of aggressive behavior:  Mother with mental health concerns, but no PMHx of dx besides \"aggressive behavior.\" Not on any medications OP, but has been on clonidine 0.1 mg, gabapentin 300 mg,a nd trazodone 50 mg in the past. Denies SI/HI. No delusional statements.Oriented x4. Was able to state back the importance of being admitted to the hospital.  - Waiting " "for Psych consult.             Diet: Regular Diet Adult    DVT Prophylaxis: Pneumatic Compression Devices and Anti-embolisim stockings (TEDs)  Crook Catheter: Not present  Lines: None     Cardiac Monitoring: None  Code Status: Full Code      Clinically Significant Risk Factors                   # Thrombocytopenia: Lowest platelets = 29 in last 2 days, will monitor for bleeding             # Overweight: Estimated body mass index is 26.58 kg/m  as calculated from the following:    Height as of this encounter: 1.676 m (5' 6\").    Weight as of this encounter: 74.7 kg (164 lb 10.9 oz)., PRESENT ON ADMISSION            Disposition Plan     Medically Ready for Discharge: Anticipated in 2-4 Days             Julian Fletcher MD  Hospitalist Service, GOLD TEAM 18  [facility]  Securely message with Tap[employer] (more info)  Text page via [employer] Paging/Directory   See signed in provider for up to date coverage information  ______________________________________________________________________    Interval History     Acute events as above  Other ROS negative    Physical Exam   Vital Signs: Temp: 98.2  F (36.8  C) Temp src: Oral BP: 98/52 Pulse: 89   Resp: 16 SpO2: 100 % O2 Device: None (Room air)    Weight: 164 lbs 10.94 oz    General Appearance: Alert, room air, no acute distress.   HEENT: Oral mucosa moist.    Respiratory: LS clear b/l, normal RR  Cardiovascular: S1, S2, no m/r/g  GI: BS+, all 4 quadrants, no masses, TTP in epigastric region. No rebound or guarding.   Neuropsych: Alert, oriented x 3. Moving all extremities. Reports bilateral LE numbness.       Medical Decision Making       50 MINUTES SPENT BY ME on the date of service doing chart review, history, exam, documentation & further activities per the note.      Data     I have personally reviewed the following data over the past 24 hrs:    N/A  \   N/A   / N/A     N/A N/A N/A /  N/A   N/A N/A N/A \       Imaging results " reviewed over the past 24 hrs:   Recent Results (from the past 24 hours)   CT Head w/o Contrast    Narrative    EXAM: CT HEAD W/O CONTRAST  10/24/2024 4:13 PM     HISTORY: Pt with noticed with confusion today, she is showing erratic  behaviour and  forgetful.       COMPARISON: None available    TECHNIQUE: Using multidetector thin collimation helical acquisition  technique, axial, coronal and sagittal CT images from the skull base  to the vertex were obtained without intravenous contrast.   (topogram) image(s) also obtained and reviewed.    FINDINGS:  No acute intracranial hemorrhage, mass effect, or midline shift. No  acute loss of gray-white matter differentiation in the cerebral  hemispheres. Ventricles are proportionate to the cerebral sulci. Clear  basal cisterns.    The bony calvaria and the bones of the skull base are normal. The  visualized portions of the paranasal sinuses and mastoid air cells are  clear. Grossly normal orbits.       Impression    IMPRESSION: No CT evidence of acute intracranial pathology.     I have personally reviewed the examination and initial interpretation  and I agree with the findings.    GINI KINNEY MD         SYSTEM ID:  [id number]

## 2024-10-25 NOTE — CONSULTS
Care Management Initial Consult    General Information  Assessment completed with: Patient,    Type of CM/SW Visit: Initial Assessment    Primary Care Provider verified and updated as needed: Yes   Readmission within the last 30 days: no previous admission in last 30 days      Reason for Consult: other (see comments) (elevated readmission risk score)  Advance Care Planning: Advance Care Planning Reviewed: no concerns identified          Communication Assessment  Patient's communication style: spoken language (English or Bilingual)    Hearing Difficulty or Deaf: no   Wear Glasses or Blind: no    Cognitive  Cognitive/Neuro/Behavioral: WDL     Arousal Level: arouses to voice, arouses to pain, arouses to touch/gentle shaking  Orientation: oriented x 4  Mood/Behavior: anxious  Best Language: 0 - No aphasia  Speech: clear    Living Environment:   People in home: other (see comments) (family, not specific)     Current living Arrangements: house      Able to return to prior arrangements: yes       Family/Social Support:  Care provided by: self, parent(s)  Provides care for: no one  Marital Status: Single  Support system: Parent(s)          Description of Support System: Involved, Supportive    Support Assessment: Adequate family and caregiver support, Adequate social supports    Current Resources:   Patient receiving home care services: No        Community Resources: None  Equipment currently used at home: none  Supplies currently used at home: None    Employment/Financial:  Employment Status: other (see comments) (unable to assess)        Financial Concerns: insurance, none   Referral to Financial Worker: Yes       Does the patient's insurance plan have a 3 day qualifying hospital stay waiver?  Yes     Which insurance plan 3 day waiver is available? Alternative insurance waiver    Will the waiver be used for post-acute placement? Undetermined at this time    Lifestyle & Psychosocial Needs:  Social Drivers of Health      Food Insecurity: Low Risk  (10/23/2024)    Food Insecurity     Within the past 12 months, did you worry that your food would run out before you got money to buy more?: No     Within the past 12 months, did the food you bought just not last and you didn t have money to get more?: No   Depression: Not at risk (8/22/2023)    PHQ-2     PHQ-2 Score: 0   Housing Stability: Low Risk  (10/23/2024)    Housing Stability     Do you have housing? : Yes     Are you worried about losing your housing?: No   Tobacco Use: High Risk (10/22/2024)    Patient History     Smoking Tobacco Use: Every Day     Smokeless Tobacco Use: Never     Passive Exposure: Not on file   Financial Resource Strain: Low Risk  (10/23/2024)    Financial Resource Strain     Within the past 12 months, have you or your family members you live with been unable to get utilities (heat, electricity) when it was really needed?: No   Alcohol Use: Not At Risk (2/6/2019)    AUDIT-C     Frequency of Alcohol Consumption: Never     Average Number of Drinks: Not on file     Frequency of Binge Drinking: Not on file   Transportation Needs: Low Risk  (10/23/2024)    Transportation Needs     Within the past 12 months, has lack of transportation kept you from medical appointments, getting your medicines, non-medical meetings or appointments, work, or from getting things that you need?: No   Physical Activity: Not on File (7/13/2021)    [facility] from [facility]    Physical Activity     Physical Activity: 0   Interpersonal Safety: Low Risk  (10/23/2024)    Interpersonal Safety     Do you feel physically and emotionally safe where you currently live?: Yes     Within the past 12 months, have you been hit, slapped, kicked or otherwise physically hurt by someone?: No     Within the past 12 months, have you been humiliated or emotionally abused in other ways by your partner or ex-partner?: No   Stress: Not on File (7/13/2021)    [facility] from [facility]    Stress     Stress: 0    Social Connections: Not on File (7/13/2021)    [facility]    Social Connections     Social Connections and Isolation: 0   Health Literacy: Not on file       Functional Status:  Prior to admission patient needed assistance:   Dependent ADLs:: Independent  Dependent IADLs:: Independent       Mental Health Status:  Mental Health Status: No Current Concerns       Chemical Dependency Status:  Chemical Dependency Status: No Current Concerns             Values/Beliefs:  Spiritual, Cultural Beliefs, [employer] Practices, Values that affect care: no               Discussed  Partnership in Safe Discharge Planning  document with patient/family: Yes:     Additional Information:  SW consulted for high readmission risk score. SW met with pt at bedside to complete CMA. SW introduced self, role, and purpose of meeting. SW verified pts address, contacts, PCP, and insurance. Pt does not have any insurance. SW placed a referral to the financial counseling team.     Pt lives with 5 family members. Pt does not use home or community services. Pt was independent with all ADL's and iADL's prior to being sick. Pt reports her feet are now numb which makes it hard to get around. Pt was working prior to hospitalization. Pt reported not having any mental or chemical health concerns, although pts mother brought pt in on concern that pt was/is using.     SW will continue to follow for safe discharge planning. Unclear if chem dep/psych will see pt.     Next Steps:   -Follow for needs as they arise.     ORESTES Herr   [facility]  Available via [employer]  Covering 8MS SANDRA, ph: [phone]

## 2024-10-25 NOTE — PLAN OF CARE
Problem: Adult Inpatient Plan of Care  Goal: Plan of Care Review  Description: The Plan of Care Review/Shift note should be completed every shift.  The Outcome Evaluation is a brief statement about your assessment that the patient is improving, declining, or no change.  This information will be displayed automatically on your shift  note.  Flowsheets (Taken 10/25/2024 9625)  Outcome Evaluation: CMA completed. Pt anticipates d/c home  Plan of Care Reviewed With: patient

## 2024-10-26 LAB
COPPER SERPL-MCNC: 104.5 UG/DL
ERYTHROCYTE [DISTWIDTH] IN BLOOD BY AUTOMATED COUNT: 23.7 % (ref 10–15)
HCT VFR BLD AUTO: 24.5 % (ref 35–47)
HGB BLD-MCNC: 7.8 G/DL (ref 11.7–15.7)
HOLD SPECIMEN: NORMAL
MCH RBC QN AUTO: 27.6 PG (ref 26.5–33)
MCHC RBC AUTO-ENTMCNC: 31.8 G/DL (ref 31.5–36.5)
MCV RBC AUTO: 87 FL (ref 78–100)
PLATELET # BLD AUTO: 43 10E3/UL (ref 150–450)
RBC # BLD AUTO: 2.83 10E6/UL (ref 3.8–5.2)
WBC # BLD AUTO: 6.9 10E3/UL (ref 4–11)

## 2024-10-26 PROCEDURE — 36415 COLL VENOUS BLD VENIPUNCTURE: CPT | Performed by: INTERNAL MEDICINE

## 2024-10-26 PROCEDURE — 99232 SBSQ HOSP IP/OBS MODERATE 35: CPT | Performed by: INTERNAL MEDICINE

## 2024-10-26 PROCEDURE — 250N000011 HC RX IP 250 OP 636

## 2024-10-26 PROCEDURE — 120N000002 HC R&B MED SURG/OB UMMC

## 2024-10-26 PROCEDURE — 250N000013 HC RX MED GY IP 250 OP 250 PS 637

## 2024-10-26 PROCEDURE — 250N000013 HC RX MED GY IP 250 OP 250 PS 637: Performed by: STUDENT IN AN ORGANIZED HEALTH CARE EDUCATION/TRAINING PROGRAM

## 2024-10-26 PROCEDURE — 250N000009 HC RX 250

## 2024-10-26 PROCEDURE — 85014 HEMATOCRIT: CPT | Performed by: INTERNAL MEDICINE

## 2024-10-26 PROCEDURE — 250N000013 HC RX MED GY IP 250 OP 250 PS 637: Performed by: INTERNAL MEDICINE

## 2024-10-26 PROCEDURE — 85048 AUTOMATED LEUKOCYTE COUNT: CPT | Performed by: INTERNAL MEDICINE

## 2024-10-26 RX ORDER — GABAPENTIN 100 MG/1
100 CAPSULE ORAL 3 TIMES DAILY
Status: DISCONTINUED | OUTPATIENT
Start: 2024-10-26 | End: 2024-10-27 | Stop reason: HOSPADM

## 2024-10-26 RX ORDER — PANTOPRAZOLE SODIUM 40 MG/1
40 TABLET, DELAYED RELEASE ORAL
Status: DISCONTINUED | OUTPATIENT
Start: 2024-10-26 | End: 2024-10-27 | Stop reason: HOSPADM

## 2024-10-26 RX ADMIN — PANTOPRAZOLE SODIUM 40 MG: 40 INJECTION, POWDER, FOR SOLUTION INTRAVENOUS at 07:47

## 2024-10-26 RX ADMIN — Medication 5 MG: at 20:17

## 2024-10-26 RX ADMIN — GABAPENTIN 100 MG: 100 CAPSULE ORAL at 20:17

## 2024-10-26 RX ADMIN — PANTOPRAZOLE SODIUM 40 MG: 40 TABLET, DELAYED RELEASE ORAL at 16:35

## 2024-10-26 RX ADMIN — CYANOCOBALAMIN 1000 MCG: 1000 INJECTION, SOLUTION INTRAMUSCULAR; SUBCUTANEOUS at 07:48

## 2024-10-26 RX ADMIN — GABAPENTIN 100 MG: 100 CAPSULE ORAL at 14:33

## 2024-10-26 RX ADMIN — ACETAMINOPHEN 325 MG: 325 TABLET, FILM COATED ORAL at 07:47

## 2024-10-26 RX ADMIN — GABAPENTIN 100 MG: 100 CAPSULE ORAL at 07:47

## 2024-10-26 NOTE — PLAN OF CARE
Goal Outcome Evaluation:      Plan of Care Reviewed With: patient, family    Pt is A&Ox4. Lungs are clear bilaterally and denies SOB. Continue to have numbness and tingling on BLE. Pt appeared anxious for short time at this shift but refused any interventions. Mom was at bedside through the shift. Pt showered at this shift with mom's assistant and set up supply. Visible skin is intact. Dark spots noted on upper back. Call light is within reach and Pt is able to make needs known.

## 2024-10-26 NOTE — PLAN OF CARE
"Goal Outcome Evaluation:      Plan of Care Reviewed With: patient    Overall Patient Progress: no changeOverall Patient Progress: no change  Shift: 4225-3094  VS: /81 (BP Location: Right arm)   Pulse 99   Temp 97.9  F (36.6  C) (Oral)   Resp 18   Ht 1.676 m (5' 6\")   Wt 74.7 kg (164 lb 10.9 oz)   SpO2 100%   BMI 26.58 kg/m      Pain: Denied pain  Neuro: A&Ox4.Makes needs known. Has bilateral LE numbness and tingling  Cardiac: WDL. Denied chest pain  Respiratory: WDL. Denied SOB, on RA  Diet/Appetite: Tolerating regular diet.   /GI: Continent of B&B.  LDA's: L PIV SL  Skin: Dark brown spots upper back  Activity: Independent  Plan: Precautions maintained / Continue with plan of care. Call light within reach, Able to make needs known.       "

## 2024-10-26 NOTE — PROGRESS NOTES
"[facility]    Medicine Progress Note - Hospitalist Service, GOLD TEAM 18    Date of Admission:  10/22/2024    Assessment & Plan   23 year old female who presents for N/V, lightheadedness, insomnia, mental health concerns, and pancytopenia. She has a notable past medical history of polysubstance use (opiates including fentanyl, marijuana, benzodiazepines, alcohol, tobacco), opiate overdose, previously on Subxone as of 2023, aggressive behavior, and AMS.    Patient was found with pancytopenia and she was evaluated by Hematology.   Hematology considered: \"Peripheral smear with low suspicion for hemolysis, more consistent with iron deficiency anemia picture, which could possibly be contributing to anemia as well. Overall, her clinical picture appears highly consistent with severe B12 deficiency, most likely due to Whippet use, with possible iron deficiency anemia as well\".  Patient received a RBC's transfusion at the ED.   Infectious workup negative.    Patient endorses bilateral LE numbness most likely secondary to neuropathy due to severe B 12 deficiency.     Changes today 10/26  -No acute events overnight.  -Patient's mother at bedside. I had a long conversation with patient and her family.   -Patient endorses anxiety and shortness of breath with ambulation. O 2 sat's 100% on room air and he lungs sound normal on physical examination. I explained to them that anemia could be playing a role on her shortness of breath.   -HGB 7.8 from 8.8 yesterday.   -WBC normal.  -Platelets trending up.   -Possible discharge home tomorrow if HGB stable.       # Suspected UGIB - Resolved.   # Epigastric pain, hematemesis, lightheadedness - Resolved.   GI recommendations:   - Okay to advance diet as tolerated.  - Could consider continuing p.o. pantoprazole 40 mg bid.  - Would appreciate hematology recommendations.   - Monitor for any evidence of GI bleeding, including hematemesis or " "melena.  - No indication for acute GI intervention at this time.  Upper endoscopy and colonoscopy should be of the same in the outpatient setting if etiology of anemia is not identified.  To be followed by PCP.    - HGB 7.8 today, trending down. Continue monitoring.      # Neuropathy   -- B 12 deficiency.   -- Continue on Gabapentin TID.      # Suspected UTI:   # Recent near pan-sensitive E.Coli UTI, treated with 5-day course of Keflex (9/26/24):    - Ucx negative. Stop Ceftriaxone.      # Pancytopenia:  # Severe vit B 12 deficiency   # Whippet abuse   - Hematology evaluated the patient  - Continue B12 IM injections daily until discharge, then discharge with oral B12  - Consider IV TXA 1000mg q8 for persistent vaginal bleeding  - Recommend against additional blood transfusions and platelet transfusions given patient's age to avoid additional blood exposure     # Subjective fevers:   - Resolved. Continue monitoring.      # Polysubstance use with possible withdrawal from unknown substance? (nitric oxide, opiates including fentanyl, marijuana, benzodiazepines, alcohol, tobacco)  # History of opiate overdose:   # Previously Subxone usage (2023):  Long history of polysubstance usage upon admission. Per discussion upon admission, only noting using \"Whippets\" nearly everyday. Last usage yesterday morning. Unable to tell me quantity. Denies any other substances, including amphetamines (noted on Utox). Normal alcohol ethyl level. Spoke with Poison Control at 2134 on 10/22, would recommend EKG, but given normal O2 sats, no need for ABG or further lab work-up recommended. Suspect some withdrawal from unknown toxic substance such as nitric oxide or methamphetamines. VS, alcohol levels, and history do not suggest alcohol withdrawal at this time.    - EKG, trend lytes  - Monitor neurological status q4hr      # Mental health concerns:   # Insomnia:   # History of aggressive behavior:  -- Evaluated by Psychiatry. Recommendations: " "\"At this point, the patient is not interested in psychiatric medications. As needed hydroxyzine and trazodone can be used for anxiety or sleep\".           Diet: Regular Diet Adult    DVT Prophylaxis: Pneumatic Compression Devices and Anti-embolisim stockings (TEDs)  Crook Catheter: Not present  Lines: None     Cardiac Monitoring: None  Code Status: Full Code      Clinically Significant Risk Factors                   # Thrombocytopenia: Lowest platelets = 29 in last 2 days, will monitor for bleeding             # Overweight: Estimated body mass index is 26.58 kg/m  as calculated from the following:    Height as of this encounter: 1.676 m (5' 6\").    Weight as of this encounter: 74.7 kg (164 lb 10.9 oz)., PRESENT ON ADMISSION            Disposition Plan     Medically Ready for Discharge: Anticipated Tomorrow       Julian Fletcher MD  Hospitalist Service, GOLD TEAM 18  [facility]  Securely message with [employer] (more info)  Text page via [employer] Paging/Directory   See signed in provider for up to date coverage information  ______________________________________________________________________    Interval History     Acute events as above.     Physical Exam   Vital Signs: Temp: 97.9  F (36.6  C) Temp src: Oral BP: 98/58 Pulse: 98   Resp: 18 SpO2: 100 % O2 Device: None (Room air)    Weight: 164 lbs 10.94 oz    General Appearance: Alert, room air, no acute distress.   HEENT: Oral mucosa moist.    Respiratory: Normal respiratory effort, clear lungs, no crackles or wheezing.   Cardiovascular: RRR.  GI: BS+, all 4 quadrants, no masses, TTP in epigastric region. No rebound or guarding.   Neuropsych: Alert, oriented x 3. Moving all extremities. Reports bilateral LE numbness.       Medical Decision Making       50 MINUTES SPENT BY ME on the date of service doing chart review, history, exam, documentation & further activities per the note.      Data     I have personally reviewed " the following data over the past 24 hrs:    6.9  \   7.8 (L)   / 43 (LL)     N/A N/A N/A /  N/A   N/A N/A N/A \       Imaging results reviewed over the past 24 hrs:   No results found for this or any previous visit (from the past 24 hours).

## 2024-10-26 NOTE — PLAN OF CARE
[id number]    Goal Outcome Evaluation:      Plan of Care Reviewed With: patient, parent, family    Overall Patient Progress: no change    Alert and oriented, stable on room air. Up SBA/independently with mother at bedside. Continues to have BLE numbness/tingling. Visible skin intact, dark spots noted on upper back. Pt IV needs new dressing, offered to complete during shift, pt and mom requested to be completed in AM so they can sleep, will reattempt dressing change before shift change. Able to make needs known. Call light within reach. Continue with current plan of care.

## 2024-10-27 VITALS
OXYGEN SATURATION: 100 % | RESPIRATION RATE: 16 BRPM | HEIGHT: 66 IN | TEMPERATURE: 97.9 F | BODY MASS INDEX: 26.47 KG/M2 | DIASTOLIC BLOOD PRESSURE: 66 MMHG | HEART RATE: 86 BPM | SYSTOLIC BLOOD PRESSURE: 114 MMHG | WEIGHT: 164.68 LBS

## 2024-10-27 LAB
B19V DNA SER QL NAA+PROBE: NOT DETECTED
ERYTHROCYTE [DISTWIDTH] IN BLOOD BY AUTOMATED COUNT: 24.7 % (ref 10–15)
HCT VFR BLD AUTO: 25.8 % (ref 35–47)
HGB BLD-MCNC: 8 G/DL (ref 11.7–15.7)
MCH RBC QN AUTO: 27.4 PG (ref 26.5–33)
MCHC RBC AUTO-ENTMCNC: 31 G/DL (ref 31.5–36.5)
MCV RBC AUTO: 88 FL (ref 78–100)
PLATELET # BLD AUTO: 72 10E3/UL (ref 150–450)
RBC # BLD AUTO: 2.92 10E6/UL (ref 3.8–5.2)
WBC # BLD AUTO: 6.2 10E3/UL (ref 4–11)

## 2024-10-27 PROCEDURE — 250N000011 HC RX IP 250 OP 636

## 2024-10-27 PROCEDURE — 85027 COMPLETE CBC AUTOMATED: CPT | Performed by: INTERNAL MEDICINE

## 2024-10-27 PROCEDURE — 99239 HOSP IP/OBS DSCHRG MGMT >30: CPT | Performed by: INTERNAL MEDICINE

## 2024-10-27 PROCEDURE — 250N000013 HC RX MED GY IP 250 OP 250 PS 637: Performed by: INTERNAL MEDICINE

## 2024-10-27 PROCEDURE — 36415 COLL VENOUS BLD VENIPUNCTURE: CPT | Performed by: INTERNAL MEDICINE

## 2024-10-27 RX ORDER — PANTOPRAZOLE SODIUM 40 MG/1
40 TABLET, DELAYED RELEASE ORAL
Qty: 60 TABLET | Refills: 0 | Status: SHIPPED | OUTPATIENT
Start: 2024-10-27

## 2024-10-27 RX ORDER — GABAPENTIN 100 MG/1
100 CAPSULE ORAL 3 TIMES DAILY
Qty: 90 CAPSULE | Refills: 0 | Status: SHIPPED | OUTPATIENT
Start: 2024-10-27 | End: 2024-11-05

## 2024-10-27 RX ORDER — UBIDECARENONE 75 MG
100 CAPSULE ORAL DAILY
Status: DISCONTINUED | OUTPATIENT
Start: 2024-10-28 | End: 2024-10-27 | Stop reason: HOSPADM

## 2024-10-27 RX ORDER — HYDROXYZINE HYDROCHLORIDE 25 MG/1
25 TABLET, FILM COATED ORAL EVERY 6 HOURS PRN
Qty: 30 TABLET | Refills: 0 | Status: SHIPPED | OUTPATIENT
Start: 2024-10-27 | End: 2024-11-05

## 2024-10-27 RX ADMIN — PANTOPRAZOLE SODIUM 40 MG: 40 TABLET, DELAYED RELEASE ORAL at 07:39

## 2024-10-27 RX ADMIN — CYANOCOBALAMIN 1000 MCG: 1000 INJECTION, SOLUTION INTRAMUSCULAR; SUBCUTANEOUS at 07:40

## 2024-10-27 RX ADMIN — GABAPENTIN 100 MG: 100 CAPSULE ORAL at 07:40

## 2024-10-27 ASSESSMENT — ACTIVITIES OF DAILY LIVING (ADL)
ADLS_ACUITY_SCORE: 0

## 2024-10-27 NOTE — PLAN OF CARE
[id number]    Goal Outcome Evaluation:      Plan of Care Reviewed With: patient    Overall Patient Progress: no change    Patient alert and oriented, stable on room air. Up independently in room with family. Regular diet, thin liquids, pills whole, denies nausea/vomiting. Denies pain. Continues to have BLE numbness/tingling. Left PIV, saline locked, refused dressing change and flush. Droplet precautions maintained. Able to make needs known. Call light within reach. Continue with current plan of care.

## 2024-10-27 NOTE — PLAN OF CARE
"Goal Outcome Evaluation:      Plan of Care Reviewed With: patient    Overall Patient Progress: no changeOverall Patient Progress: no change  Shift: 0616-8226  VS: /66 (BP Location: Right arm)   Pulse 93   Temp 98.9  F (37.2  C) (Oral)   Resp 16   Ht 1.676 m (5' 6\")   Wt 74.7 kg (164 lb 10.9 oz)   SpO2 100%   BMI 26.58 kg/m      Pain: Denied pain  Neuro: A&Ox4.Makes needs known. Has bilateral LE numbness and tingling  Cardiac: WDL. Denied chest pain  Respiratory: WDL. Denied SOB, on RA  Diet/Appetite: Tolerating regular diet.   /GI: Continent of B&B.  LDA's: L PIV SL  Skin: Dark brown spots upper back  Activity: Independent  Plan: Precautions maintained / Continue with plan of care. Call light within reach, Able to make needs known.       Discharge instruction reviewed.  Patient verbalized understanding. PIV removed, patient ambulated to the \[facility]\"". Family awaiting at \[facility]\"". Patient discharged    "

## 2024-10-28 ENCOUNTER — PATIENT OUTREACH (OUTPATIENT)
Dept: CARE COORDINATION | Facility: CLINIC | Age: 24
End: 2024-10-28

## 2024-10-28 LAB
ATRIAL RATE - MUSE: 94 BPM
DIASTOLIC BLOOD PRESSURE - MUSE: NORMAL MMHG
INTERPRETATION ECG - MUSE: NORMAL
P AXIS - MUSE: 48 DEGREES
PR INTERVAL - MUSE: 156 MS
QRS DURATION - MUSE: 74 MS
QT - MUSE: 352 MS
QTC - MUSE: 440 MS
R AXIS - MUSE: 74 DEGREES
SYSTOLIC BLOOD PRESSURE - MUSE: NORMAL MMHG
T AXIS - MUSE: 67 DEGREES
VENTRICULAR RATE- MUSE: 94 BPM

## 2024-10-28 NOTE — DISCHARGE SUMMARY
"[facility]  Hospitalist Discharge Summary      Date of Admission:  10/22/2024  Date of Discharge:  10/27/2024  3:00 PM  Discharging Provider: Julian Fletcher MD  Discharge Service: Hospitalist Service, GOLD TEAM 18    Discharge Diagnoses     # Suspected UGIB - Resolved.   # Epigastric pain, hematemesis, lightheadedness - Resolved.  # Neuropathy   # Pancytopenia  # Severe vit B 12 deficiency   # Whippet abuse     Clinically Significant Risk Factors     # Overweight: Estimated body mass index is 26.58 kg/m  as calculated from the following:    Height as of this encounter: 1.676 m (5' 6\").    Weight as of this encounter: 74.7 kg (164 lb 10.9 oz).       Follow-ups Needed After Discharge   Follow-up Appointments     Adult [facility] Follow-up and recommended labs and tests      Follow up with primary care provider, Physician No Ref-Primary, within 7   days for hospital follow- up.  The following labs/tests are recommended:   CBC.      Appointments on [place] and/or [facility] (with [facility] or [facility]   provider or service). Call [phone] if you haven't heard regarding   these appointments within 7 days of discharge.            Unresulted Labs Ordered in the Past 30 Days of this Admission       No orders found from 9/22/2024 to 10/23/2024.            Discharge Disposition   Discharged to home  Condition at discharge: Stable    Hospital Course   Camelia Corea is a 23 year old female who presents for N/V, lightheadedness, insomnia, mental health concerns, and pancytopenia. She has a notable past medical history of polysubstance use (opiates including fentanyl, marijuana, benzodiazepines, alcohol, tobacco), opiate overdose, previously on Subxone as of 2023, aggressive behavior, and AMS.    Patient was found with pancytopenia and she was evaluated by Hematology.   Hematology considered: \"Peripheral smear with low suspicion for hemolysis, more consistent with iron deficiency " "anemia picture, which could possibly be contributing to anemia as well. Overall, her clinical picture appears highly consistent with severe B12 deficiency, most likely due to Whippet use, with possible iron deficiency anemia as well\".  Patient received a RBC's transfusion at the ED.   Infectious workup negative.    Patient endorses bilateral LE numbness most likely secondary to neuropathy due to severe B 12 deficiency.   Hematology gave recommendations as below. Blood counts improved on the day of discharge. Substance abuse counseling was given to the patient and her mother at bedside. I recommended close follow-up with PCP as outpatient.     # Suspected UGIB - Resolved.   # Epigastric pain, hematemesis, lightheadedness - Resolved.   GI recommendations:   - Okay to advance diet as tolerated.  - Could consider continuing p.o. pantoprazole 40 mg bid.  - Would appreciate hematology recommendations.   - Monitor for any evidence of GI bleeding, including hematemesis or melena.  - No indication for acute GI intervention at this time.  Upper endoscopy and colonoscopy should be of the same in the outpatient setting if etiology of anemia is not identified.  To be followed by PCP.     - HGB 7.8 today, trending down. Continue monitoring.       # Neuropathy   -- B 12 deficiency.   -- Continue on Gabapentin TID.      # Suspected UTI:   # Recent near pan-sensitive E.Coli UTI, treated with 5-day course of Keflex (9/26/24):    - Ucx negative. Stop Ceftriaxone.      # Pancytopenia:  # Severe vit B 12 deficiency   # Whippet abuse   - Hematology evaluated the patient  - Continue B12 IM injections daily until discharge, then discharge with oral B12  - Consider IV TXA 1000mg q8 for persistent vaginal bleeding  - Recommend against additional blood transfusions and platelet transfusions given patient's age to avoid additional blood exposure     # Subjective fevers:   - Resolved. Continue monitoring.      # Polysubstance use with possible " "withdrawal from unknown substance? (nitric oxide, opiates including fentanyl, marijuana, benzodiazepines, alcohol, tobacco)  # History of opiate overdose:   # Previously Subxone usage (2023):  Long history of polysubstance usage upon admission. Per discussion upon admission, only noting using \"Whippets\" nearly everyday. Last usage yesterday morning. Unable to tell me quantity. Denies any other substances, including amphetamines (noted on Utox). Normal alcohol ethyl level. Spoke with Poison Control at 2134 on 10/22, would recommend EKG, but given normal O2 sats, no need for ABG or further lab work-up recommended. Suspect some withdrawal from unknown toxic substance such as nitric oxide or methamphetamines. VS, alcohol levels, and history do not suggest alcohol withdrawal at this time.    - EKG, trend lytes  - Monitor neurological status q4hr      # Mental health concerns:   # Insomnia:   # History of aggressive behavior:  -- Evaluated by Psychiatry. Recommendations: \"At this point, the patient is not interested in psychiatric medications. As needed hydroxyzine and trazodone can be used for anxiety or sleep\".         Consultations This Hospital Stay   HEMATOLOGY ADULT IP CONSULT  GI LUMINAL ADULT IP CONSULT  MEDICATION HISTORY IP PHARMACY CONSULT  PSYCHIATRY IP CONSULT  GI LUMINAL ADULT IP CONSULT  CARE MANAGEMENT / SOCIAL WORK IP CONSULT  PSYCHIATRY IP CONSULT    Code Status   Prior    Time Spent on this Encounter   I, Julian Fletcher MD, personally saw the patient today and spent greater than 30 minutes discharging this patient.       Julian Fletcher MD  [facility] [address]  [address] [place] [id number]  Phone: [phone]  Fax: [phone]  ______________________________________________________________________    Physical Exam   Vital Signs: Temp: 97.9  F (36.6  C) Temp src: Oral   Pulse: 86   Resp: 16   O2 Device: None (Room air)    Weight: 164 lbs 10.94 oz  General Appearance: Alert, room " "air, no acute distress.   HEENT: Oral mucosa moist.    Respiratory: Normal respiratory effort, clear lungs, no crackles or wheezing.   Cardiovascular: RRR.  GI: BS+, all 4 quadrants, no masses, TTP in epigastric region. No rebound or guarding.   Neuropsych: Alert, oriented x 3. Moving all extremities. Reports bilateral LE numbness.        Primary Care Physician   Physician No Ref-Primary    Discharge Orders      CBC with platelets     Reason for your hospital stay    23 year old female who presents for N/V, lightheadedness, insomnia, mental health concerns, and pancytopenia. She has a notable past medical history of polysubstance use (opiates including fentanyl, marijuana, benzodiazepines, alcohol, tobacco), opiate overdose, previously on Subxone as of 2023, aggressive behavior, and AMS.    Patient was found with pancytopenia and she was evaluated by Hematology.   Hematology considered: \"Peripheral smear with low suspicion for hemolysis, more consistent with iron deficiency anemia picture, which could possibly be contributing to anemia as well. Overall, her clinical picture appears highly consistent with severe B12 deficiency, most likely due to Whippet use, with possible iron deficiency anemia as well\".     Activity    Your activity upon discharge: activity as tolerated     Adult [facility] Follow-up and recommended labs and tests    Follow up with primary care provider, Physician No Ref-Primary, within 7 days for hospital follow- up.  The following labs/tests are recommended: CBC.      Appointments on [place] and/or [facility] (with [facility] or [facility] provider or service). Call [phone] if you haven't heard regarding these appointments within 7 days of discharge.     Diet    Follow this diet upon discharge: Current Diet:Orders Placed This Encounter      Regular Diet Adult       Discharge Follow-up Details         Significant Results and Procedures   Results for orders placed or performed during the hospital " encounter of 10/22/24   XR Chest 2 Views    Narrative    XR CHEST 2 VIEWS  10/22/2024 4:27 PM       INDICATION: chest pain  COMPARISON: 9/25/2024       Impression    IMPRESSION: Negative chest.    ADALI MCCABE MD         SYSTEM ID:  [id number]   CT Head w/o Contrast    Narrative    EXAM: CT HEAD W/O CONTRAST  10/24/2024 4:13 PM     HISTORY: Pt with noticed with confusion today, she is showing erratic  behaviour and  forgetful.       COMPARISON: None available    TECHNIQUE: Using multidetector thin collimation helical acquisition  technique, axial, coronal and sagittal CT images from the skull base  to the vertex were obtained without intravenous contrast.   (topogram) image(s) also obtained and reviewed.    FINDINGS:  No acute intracranial hemorrhage, mass effect, or midline shift. No  acute loss of gray-white matter differentiation in the cerebral  hemispheres. Ventricles are proportionate to the cerebral sulci. Clear  basal cisterns.    The bony calvaria and the bones of the skull base are normal. The  visualized portions of the paranasal sinuses and mastoid air cells are  clear. Grossly normal orbits.       Impression    IMPRESSION: No CT evidence of acute intracranial pathology.     I have personally reviewed the examination and initial interpretation  and I agree with the findings.    GINI KINNEY MD         SYSTEM ID:  [id number]       Discharge Medications   Discharge Medication List as of 10/27/2024 12:07 PM        START taking these medications    Details   cyanocobalamin (CYANOCOBALAMIN) 100 MCG tablet Take 1 tablet (100 mcg) by mouth daily., Disp-30 tablet, R-0, E-Prescribe      gabapentin (NEURONTIN) 100 MG capsule Take 1 capsule (100 mg) by mouth 3 times daily., Disp-90 capsule, R-0, E-Prescribe      hydrOXYzine HCl (ATARAX) 25 MG tablet Take 1 tablet (25 mg) by mouth every 6 hours as needed for other (adjuvant pain)., Disp-30 tablet, R-0, E-Prescribe      pantoprazole (PROTONIX) 40 MG EC tablet  Take 1 tablet (40 mg) by mouth 2 times daily (before meals)., Disp-60 tablet, R-0, E-Prescribe           Allergies   No Known Allergies

## 2024-10-28 NOTE — PROGRESS NOTES
"Clinic Care Coordination Contact  Transitions of Care Outreach  Chief Complaint   Patient presents with    Clinic Care Coordination - Post Hospital       Most Recent Admission Date: 10/22/2024   Most Recent Admission Diagnosis: History of substance abuse (H) - F19.11  Pancytopenia (H) - D61.818     Most Recent Discharge Date: 10/27/2024   Most Recent Discharge Diagnosis: Pancytopenia (H) - D61.818  History of substance abuse (H) - F19.11  Nausea and vomiting, unspecified vomiting type - R11.2  Anxiety - F41.9  Vitamin B 12 deficiency - E53.8  Neuropathy - G62.9     Transitions of Care Assessment    Discharge Assessment  How are you doing now that you are home?: \"Yes I am doing, I'm taking my medications too.\"  How are your symptoms? (Red Flag symptoms escalate to triage hotline per guidelines): Improved  Do you know how to contact your clinic care team if you have future questions or changes to your health status? : Yes  Does the patient have their discharge instructions? : Yes  Does the patient have questions regarding their discharge instructions? : No  Were you started on any new medications or were there changes to any of your previous medications? : Yes  Does the patient have all of their medications?: Yes  Do you have questions regarding any of your medications? : No  Do you have all of your needed medical supplies or equipment (DME)?  (i.e. oxygen tank, CPAP, cane, etc.): Yes    Post-op ([facility] CTA Only)  If the patient had a surgery or procedure, do they have any questions for a nurse?: No      Follow up Plan     Discharge Follow-Up  Discharge follow up appointment scheduled in alignment with recommended follow up timeframe or Transitions of Risk Category? (Low = within 30 days; Moderate= within 14 days; High= within 7 days): Yes  Discharge Follow Up Appointment Date: 11/05/24  Discharge Follow Up Appointment Scheduled with?: Primary Care Provider ([facility] scheduled pt a follow-up appt.)    Future Appointments "   Date Time Provider Department Center   11/5/2024 11:00 AM Lissette Velasquez, APRN CNP [facility]       Outpatient Plan as outlined on AVS reviewed with patient.    For any urgent concerns, please contact our 24 hour nurse triage line: [phone] ([phone])       IVON No  [phone]  Connected Care Resource [facility]

## 2024-11-05 ENCOUNTER — OFFICE VISIT (OUTPATIENT)
Dept: FAMILY MEDICINE | Facility: CLINIC | Age: 24
End: 2024-11-05

## 2024-11-05 ENCOUNTER — TELEPHONE (OUTPATIENT)
Dept: NURSING | Facility: CLINIC | Age: 24
End: 2024-11-05

## 2024-11-05 ENCOUNTER — HOSPITAL ENCOUNTER (EMERGENCY)
Facility: CLINIC | Age: 24
Discharge: HOME | End: 2024-11-06
Attending: EMERGENCY MEDICINE | Admitting: EMERGENCY MEDICINE

## 2024-11-05 ENCOUNTER — TELEPHONE (OUTPATIENT)
Dept: FAMILY MEDICINE | Facility: CLINIC | Age: 24
End: 2024-11-05

## 2024-11-05 VITALS
RESPIRATION RATE: 20 BRPM | HEART RATE: 93 BPM | SYSTOLIC BLOOD PRESSURE: 92 MMHG | TEMPERATURE: 96.7 F | BODY MASS INDEX: 28.53 KG/M2 | HEIGHT: 66 IN | WEIGHT: 177.5 LBS | DIASTOLIC BLOOD PRESSURE: 68 MMHG | OXYGEN SATURATION: 98 %

## 2024-11-05 DIAGNOSIS — D61.818 PANCYTOPENIA (H): ICD-10-CM

## 2024-11-05 DIAGNOSIS — D75.839 THROMBOCYTOSIS: ICD-10-CM

## 2024-11-05 DIAGNOSIS — G62.9 NEUROPATHY: ICD-10-CM

## 2024-11-05 DIAGNOSIS — F11.20 OPIOID USE DISORDER, SEVERE, DEPENDENCE (H): ICD-10-CM

## 2024-11-05 DIAGNOSIS — E53.8 VITAMIN B 12 DEFICIENCY: ICD-10-CM

## 2024-11-05 DIAGNOSIS — E53.8 VITAMIN B12 DEFICIENCY (NON ANEMIC): Primary | ICD-10-CM

## 2024-11-05 DIAGNOSIS — R74.8 ELEVATED LIPASE: ICD-10-CM

## 2024-11-05 LAB
BASOPHILS # BLD AUTO: 0.1 10E3/UL (ref 0–0.2)
BASOPHILS NFR BLD AUTO: 1 %
EOSINOPHIL # BLD AUTO: 0.1 10E3/UL (ref 0–0.7)
EOSINOPHIL NFR BLD AUTO: 1 %
ERYTHROCYTE [DISTWIDTH] IN BLOOD BY AUTOMATED COUNT: 23.9 % (ref 10–15)
HCT VFR BLD AUTO: 32.2 % (ref 35–47)
HGB BLD-MCNC: 9.9 G/DL (ref 11.7–15.7)
IMM GRANULOCYTES # BLD: 0.1 10E3/UL
IMM GRANULOCYTES NFR BLD: 1 %
LIPASE SERPL-CCNC: 31 U/L (ref 13–60)
LYMPHOCYTES # BLD AUTO: 2 10E3/UL (ref 0.8–5.3)
LYMPHOCYTES NFR BLD AUTO: 20 %
MCH RBC QN AUTO: 26.5 PG (ref 26.5–33)
MCHC RBC AUTO-ENTMCNC: 30.7 G/DL (ref 31.5–36.5)
MCV RBC AUTO: 86 FL (ref 78–100)
MONOCYTES # BLD AUTO: 1.1 10E3/UL (ref 0–1.3)
MONOCYTES NFR BLD AUTO: 11 %
NEUTROPHILS # BLD AUTO: 6.7 10E3/UL (ref 1.6–8.3)
NEUTROPHILS NFR BLD AUTO: 67 %
NRBC # BLD AUTO: 0 10E3/UL
NRBC BLD AUTO-RTO: 0 /100
PLATELET # BLD AUTO: 1257 10E3/UL (ref 150–450)
RBC # BLD AUTO: 3.74 10E6/UL (ref 3.8–5.2)
VIT B12 SERPL-MCNC: 527 PG/ML (ref 232–1245)
WBC # BLD AUTO: 10 10E3/UL (ref 4–11)

## 2024-11-05 PROCEDURE — 93010 ELECTROCARDIOGRAM REPORT: CPT | Performed by: EMERGENCY MEDICINE

## 2024-11-05 PROCEDURE — 85025 COMPLETE CBC W/AUTO DIFF WBC: CPT | Performed by: NURSE PRACTITIONER

## 2024-11-05 PROCEDURE — 85045 AUTOMATED RETICULOCYTE COUNT: CPT | Performed by: EMERGENCY MEDICINE

## 2024-11-05 PROCEDURE — 36415 COLL VENOUS BLD VENIPUNCTURE: CPT | Performed by: NURSE PRACTITIONER

## 2024-11-05 PROCEDURE — 85060 BLOOD SMEAR INTERPRETATION: CPT | Performed by: STUDENT IN AN ORGANIZED HEALTH CARE EDUCATION/TRAINING PROGRAM

## 2024-11-05 PROCEDURE — 84484 ASSAY OF TROPONIN QUANT: CPT | Performed by: EMERGENCY MEDICINE

## 2024-11-05 PROCEDURE — 83690 ASSAY OF LIPASE: CPT | Performed by: NURSE PRACTITIONER

## 2024-11-05 PROCEDURE — 84702 CHORIONIC GONADOTROPIN TEST: CPT | Performed by: EMERGENCY MEDICINE

## 2024-11-05 PROCEDURE — 93005 ELECTROCARDIOGRAM TRACING: CPT | Performed by: EMERGENCY MEDICINE

## 2024-11-05 PROCEDURE — 84443 ASSAY THYROID STIM HORMONE: CPT | Performed by: EMERGENCY MEDICINE

## 2024-11-05 PROCEDURE — 36415 COLL VENOUS BLD VENIPUNCTURE: CPT | Performed by: EMERGENCY MEDICINE

## 2024-11-05 PROCEDURE — 85004 AUTOMATED DIFF WBC COUNT: CPT | Performed by: EMERGENCY MEDICINE

## 2024-11-05 PROCEDURE — 83880 ASSAY OF NATRIURETIC PEPTIDE: CPT | Performed by: EMERGENCY MEDICINE

## 2024-11-05 PROCEDURE — 82607 VITAMIN B-12: CPT | Performed by: NURSE PRACTITIONER

## 2024-11-05 PROCEDURE — 99284 EMERGENCY DEPT VISIT MOD MDM: CPT | Performed by: EMERGENCY MEDICINE

## 2024-11-05 PROCEDURE — 85379 FIBRIN DEGRADATION QUANT: CPT | Performed by: EMERGENCY MEDICINE

## 2024-11-05 PROCEDURE — 82310 ASSAY OF CALCIUM: CPT | Performed by: EMERGENCY MEDICINE

## 2024-11-05 PROCEDURE — 99285 EMERGENCY DEPT VISIT HI MDM: CPT | Mod: 25 | Performed by: EMERGENCY MEDICINE

## 2024-11-05 RX ORDER — GABAPENTIN 100 MG/1
100 CAPSULE ORAL 3 TIMES DAILY
Qty: 90 CAPSULE | Refills: 1 | Status: SHIPPED | OUTPATIENT
Start: 2024-11-05

## 2024-11-05 ASSESSMENT — ANXIETY QUESTIONNAIRES
1. FEELING NERVOUS, ANXIOUS, OR ON EDGE: SEVERAL DAYS
GAD7 TOTAL SCORE: 2
IF YOU CHECKED OFF ANY PROBLEMS ON THIS QUESTIONNAIRE, HOW DIFFICULT HAVE THESE PROBLEMS MADE IT FOR YOU TO DO YOUR WORK, TAKE CARE OF THINGS AT HOME, OR GET ALONG WITH OTHER PEOPLE: EXTREMELY DIFFICULT
6. BECOMING EASILY ANNOYED OR IRRITABLE: NOT AT ALL
GAD7 TOTAL SCORE: 2
5. BEING SO RESTLESS THAT IT IS HARD TO SIT STILL: SEVERAL DAYS
7. FEELING AFRAID AS IF SOMETHING AWFUL MIGHT HAPPEN: NOT AT ALL
3. WORRYING TOO MUCH ABOUT DIFFERENT THINGS: NOT AT ALL
4. TROUBLE RELAXING: NOT AT ALL
GAD7 TOTAL SCORE: 2
8. IF YOU CHECKED OFF ANY PROBLEMS, HOW DIFFICULT HAVE THESE MADE IT FOR YOU TO DO YOUR WORK, TAKE CARE OF THINGS AT HOME, OR GET ALONG WITH OTHER PEOPLE?: EXTREMELY DIFFICULT
7. FEELING AFRAID AS IF SOMETHING AWFUL MIGHT HAPPEN: NOT AT ALL
2. NOT BEING ABLE TO STOP OR CONTROL WORRYING: NOT AT ALL

## 2024-11-05 ASSESSMENT — ACTIVITIES OF DAILY LIVING (ADL): ADLS_ACUITY_SCORE: 0

## 2024-11-05 ASSESSMENT — PATIENT HEALTH QUESTIONNAIRE - PHQ9
SUM OF ALL RESPONSES TO PHQ QUESTIONS 1-9: 7
SUM OF ALL RESPONSES TO PHQ QUESTIONS 1-9: 7
10. IF YOU CHECKED OFF ANY PROBLEMS, HOW DIFFICULT HAVE THESE PROBLEMS MADE IT FOR YOU TO DO YOUR WORK, TAKE CARE OF THINGS AT HOME, OR GET ALONG WITH OTHER PEOPLE: EXTREMELY DIFFICULT

## 2024-11-05 ASSESSMENT — PAIN SCALES - GENERAL: PAINLEVEL_OUTOF10: MILD PAIN (3)

## 2024-11-05 ASSESSMENT — COLUMBIA-SUICIDE SEVERITY RATING SCALE - C-SSRS
6. HAVE YOU EVER DONE ANYTHING, STARTED TO DO ANYTHING, OR PREPARED TO DO ANYTHING TO END YOUR LIFE?: NO
2. HAVE YOU ACTUALLY HAD ANY THOUGHTS OF KILLING YOURSELF IN THE PAST MONTH?: NO
1. IN THE PAST MONTH, HAVE YOU WISHED YOU WERE DEAD OR WISHED YOU COULD GO TO SLEEP AND NOT WAKE UP?: NO

## 2024-11-05 NOTE — TELEPHONE ENCOUNTER
Date/time of call received from lab: 11/05/24 at 1:00 PM.    Lab test:  Platelet count     Lab value:  1177    Ordering provider name: KAHLIL Godoy CNP    Ordering provider department: Primary Care     Primary Care Provider: No Ref-Primary, Physician     Result managed by: Clinic Hours: Primary Care clinic RN staff. Was test ordered in Primary Care?: Yes, ordered in Primary Care Primary Care: route telephone encounter high priority to ordering provider and ordering provider's clinic Nurse pool

## 2024-11-05 NOTE — TELEPHONE ENCOUNTER
Teams message a chart routed to provider. Huddled with provider.     Jenn Renee RN  [facility]

## 2024-11-05 NOTE — TELEPHONE ENCOUNTER
Left message requesting a call back on pt's home number. Called cell phone number and was unable to leave message.     Will attempt again soon.    Jenn Renee RN  [facility]

## 2024-11-05 NOTE — Clinical Note
Can you look at which is better coding for this patient - post hospital within 14 days or new patient level 4?

## 2024-11-05 NOTE — PROGRESS NOTES
Assessment & Plan     Pancytopenia (H)  Hemoglobin continues to improve  Platelets >1200 today - see below  - CBC with platelets and differential; Future  - CBC with platelets and differential  - CBC with platelets and differential; Future  - Lab Blood Morphology Pathologist Review; Future    Thrombocytosis  Unclear if this is an expected level of platelet increase from <100 to >1000 with the recent clinical picture of low hemoglobin 2/2 Whippet abuse and B12 deficiency both being corrected. E-consult to hematology today. Consider lab follow-up tomorrow.  No signs in history or exam for infection. No known spleen injury or splenectomy. CRP only very mildly elevated inpatient and no systemic symptoms so seems less likely a rheumatologic cause. It doesn't seem that her anemia was thought to be hemolysis or JUAN ALBERTO. I have concerns about malignancy.  - Adult E-Consult to Hematology (Outpt Provider to Specialist Written Question & Response)  - CBC with platelets and differential; Future  - Lab Blood Morphology Pathologist Review; Future    Opioid use disorder, severe, dependence (H)  Sober from opioids one+ year  Has stopped Whippets    Vitamin B12 deficiency (non anemic)  Recheck pending  - Vitamin B12; Future  - Vitamin B12    Vitamin B 12 deficiency  - vitamin B-12 (CYANOCOBALAMIN) 100 MCG tablet; Take 1 tablet (100 mcg) by mouth daily.  Anticipate long term supplement at this point    Neuropathy  Improved  Continue gabapentin. May be able to discontinue once B12 replaced to normal levels  - gabapentin (NEURONTIN) 100 MG capsule; Take 1 capsule (100 mg) by mouth 3 times daily.    Elevated lipase  Rechecked today. No abdominal pain.  - Lipase; Future  - Lipase        MED REC REQUIRED  Post Medication Reconciliation Status:  Discharge medications reconciled and changed, see notes/orders  Nicotine/Tobacco Cessation  She reports that she has been smoking vaping device. She has never used smokeless  tobacco.  Nicotine/Tobacco Cessation Plan  Information offered: Patient not interested at this time    Review of prior external note(s) from - inpatient notes and discharge summary  Ordering of each unique test  Prescription drug management  I spent a total of 48 minutes on the day of the visit.   Time spent by me doing chart review, history and exam, documentation and further activities per the note    Octavia Denise is a 23 year old, presenting for the following health issues:  Hospital F/U      11/5/2024    10:56 AM   Additional Questions   Roomed by Earline HART     [facility]        Hospital Follow-up Visit:    Hospital/Nursing Home/IP Rehab Facility: [facility]  Date of Admission: 10/22/2024  Date of Discharge: 10/27/2024  Reason(s) for Admission: Epigastric pain, neuropathy, pancytopenia, B12 deficiency, and whippet abuse  Was the patient in the ICU or did the patient experience delirium during hospitalization?  Yes       Do you have any other stressors you would like to discuss with your provider? No    Problems taking medications regularly:  None  Medication changes since discharge: None  Problems adhering to non-medication therapy:  None    Summary of hospitalization:  [facility] discharge summary reviewed  Diagnostic Tests/Treatments reviewed.  Follow up needed: CBC  Other Healthcare Providers Involved in Patient s Care:         None  Update since discharge: improved.     Went into the ED with bleeding and nerve damage. Skin had been changing colors.  Initial hgb 7. Received transfusions  Diagnosed with anemia and severe B12 deficiency (B12 level less than 150)  Has been taking oral vitamin B12 replacement 100 mcg daily  Has stopped taking Whippets    Other lab values of note - elevated AST, elevated lipase    Plan of care communicated with patient               Review of Systems  Constitutional, neuro, ENT, endocrine, pulmonary, cardiac,  "gastrointestinal, genitourinary, musculoskeletal, integument and psychiatric systems are negative, except as otherwise noted.      Objective    BP 92/68 (BP Location: Left arm, Patient Position: Sitting, Cuff Size: Adult Regular)   Pulse 93   Temp (!) 96.7  F (35.9  C) (Temporal)   Resp 20   Ht 1.676 m (5' 6\")   Wt 80.5 kg (177 lb 8 oz)   LMP 10/22/2024 (Within Days)   SpO2 98%   BMI 28.65 kg/m    Body mass index is 28.65 kg/m .  Physical Exam   GENERAL: alert and no distress  EYES: Eyes grossly normal to inspection, PERRL and conjunctivae and sclerae normal  HENT: ear canals and TM's normal, nose and mouth without ulcers or lesions  NECK: no adenopathy, no asymmetry, masses, or scars  RESP: lungs clear to auscultation - no rales, rhonchi or wheezes  CV: regular rate and rhythm, normal S1 S2, no S3 or S4, no murmur, click or rub, no peripheral edema  ABDOMEN: soft, nontender, no hepatosplenomegaly, no masses and bowel sounds normal  MS: no gross musculoskeletal defects noted, no edema  SKIN: no suspicious lesions or rashes  NEURO: Normal strength and tone, mentation intact and speech normal  PSYCH: mentation appears normal, affect normal/bright          Signed Electronically by: KAHLIL Francis CNP    Answers submitted by the patient for this visit:  Patient Health Questionnaire (Submitted on 11/5/2024)  If you checked off any problems, how difficult have these problems made it for you to do your work, take care of things at home, or get along with other people?: Extremely difficult  PHQ9 TOTAL SCORE: 7  Patient Health Questionnaire (G7) (Submitted on 11/5/2024)  TAY 7 TOTAL SCORE: 2    "

## 2024-11-05 NOTE — TELEPHONE ENCOUNTER
Please call patient - there was an unexpected result on her bloodwork showing a big increase in platelets. I'd like to submit a e-consult to hematology asap, but I need her ok to do this. The econsult is a way to get specialist input sooner than a traditional appt and patient doesn't need to do anything for that to happen. I'd submit the consult and get their recommendations. If she does not agree to this, please let me know and adviser her to come for lab recheck tomorrow.  But I strongly advise the e-consult. TRINO Jones

## 2024-11-05 NOTE — TELEPHONE ENCOUNTER
Called pt a 2nd time. Pt is agreeable to E-consult. Pt advised to please answer phone for when we call back. Thanks    Jenn Renee RN  [facility]

## 2024-11-06 ENCOUNTER — APPOINTMENT (OUTPATIENT)
Dept: ULTRASOUND IMAGING | Facility: CLINIC | Age: 24
End: 2024-11-06
Attending: EMERGENCY MEDICINE

## 2024-11-06 ENCOUNTER — E-CONSULT (OUTPATIENT)
Dept: HEMATOLOGY | Facility: CLINIC | Age: 24
End: 2024-11-06

## 2024-11-06 ENCOUNTER — APPOINTMENT (OUTPATIENT)
Dept: GENERAL RADIOLOGY | Facility: CLINIC | Age: 24
End: 2024-11-06
Attending: EMERGENCY MEDICINE

## 2024-11-06 VITALS
DIASTOLIC BLOOD PRESSURE: 58 MMHG | WEIGHT: 177 LBS | OXYGEN SATURATION: 98 % | RESPIRATION RATE: 16 BRPM | TEMPERATURE: 97.9 F | BODY MASS INDEX: 28.57 KG/M2 | HEART RATE: 96 BPM | SYSTOLIC BLOOD PRESSURE: 95 MMHG

## 2024-11-06 LAB
ALBUMIN SERPL BCG-MCNC: 3.9 G/DL (ref 3.5–5.2)
ALBUMIN UR-MCNC: NEGATIVE MG/DL
ALP SERPL-CCNC: 70 U/L (ref 40–150)
ALT SERPL W P-5'-P-CCNC: 17 U/L (ref 0–50)
ANION GAP SERPL CALCULATED.3IONS-SCNC: 8 MMOL/L (ref 7–15)
APPEARANCE UR: CLEAR
AST SERPL W P-5'-P-CCNC: 19 U/L (ref 0–45)
BASOPHILS # BLD AUTO: 0.1 10E3/UL (ref 0–0.2)
BASOPHILS NFR BLD AUTO: 1 %
BILIRUB SERPL-MCNC: 0.2 MG/DL
BILIRUB UR QL STRIP: NEGATIVE
BUN SERPL-MCNC: 15.5 MG/DL (ref 6–20)
CALCIUM SERPL-MCNC: 9 MG/DL (ref 8.8–10.4)
CHLORIDE SERPL-SCNC: 108 MMOL/L (ref 98–107)
COLOR UR AUTO: YELLOW
CREAT SERPL-MCNC: 0.77 MG/DL (ref 0.51–0.95)
D DIMER PPP FEU-MCNC: 0.31 UG/ML FEU (ref 0–0.5)
EGFRCR SERPLBLD CKD-EPI 2021: >90 ML/MIN/1.73M2
EOSINOPHIL # BLD AUTO: 0.1 10E3/UL (ref 0–0.7)
EOSINOPHIL NFR BLD AUTO: 1 %
ERYTHROCYTE [DISTWIDTH] IN BLOOD BY AUTOMATED COUNT: 23.9 % (ref 10–15)
FLUAV RNA SPEC QL NAA+PROBE: NEGATIVE
FLUBV RNA RESP QL NAA+PROBE: NEGATIVE
GLUCOSE SERPL-MCNC: 110 MG/DL (ref 70–99)
GLUCOSE UR STRIP-MCNC: NEGATIVE MG/DL
HCG INTACT+B SERPL-ACNC: <1 MIU/ML
HCO3 SERPL-SCNC: 25 MMOL/L (ref 22–29)
HCT VFR BLD AUTO: 30.3 % (ref 35–47)
HGB BLD-MCNC: 9.4 G/DL (ref 11.7–15.7)
HGB UR QL STRIP: NEGATIVE
IMM GRANULOCYTES # BLD: 0.1 10E3/UL
IMM GRANULOCYTES NFR BLD: 0 %
KETONES UR STRIP-MCNC: NEGATIVE MG/DL
LEUKOCYTE ESTERASE UR QL STRIP: NEGATIVE
LYMPHOCYTES # BLD AUTO: 2.3 10E3/UL (ref 0.8–5.3)
LYMPHOCYTES NFR BLD AUTO: 20 %
MCH RBC QN AUTO: 26.9 PG (ref 26.5–33)
MCHC RBC AUTO-ENTMCNC: 31 G/DL (ref 31.5–36.5)
MCV RBC AUTO: 87 FL (ref 78–100)
MONOCYTES # BLD AUTO: 1.1 10E3/UL (ref 0–1.3)
MONOCYTES NFR BLD AUTO: 9 %
MUCOUS THREADS #/AREA URNS LPF: PRESENT /LPF
NEUTROPHILS # BLD AUTO: 8.2 10E3/UL (ref 1.6–8.3)
NEUTROPHILS NFR BLD AUTO: 69 %
NITRATE UR QL: NEGATIVE
NRBC # BLD AUTO: 0 10E3/UL
NRBC BLD AUTO-RTO: 0 /100
NT-PROBNP SERPL-MCNC: 84 PG/ML (ref 0–450)
PATH REPORT.COMMENTS IMP SPEC: NORMAL
PATH REPORT.COMMENTS IMP SPEC: NORMAL
PATH REPORT.FINAL DX SPEC: NORMAL
PATH REPORT.MICROSCOPIC SPEC OTHER STN: NORMAL
PATH REPORT.MICROSCOPIC SPEC OTHER STN: NORMAL
PATH REPORT.RELEVANT HX SPEC: NORMAL
PH UR STRIP: 6.5 [PH] (ref 5–7)
PLAT MORPH BLD: NORMAL
PLATELET # BLD AUTO: 1183 10E3/UL (ref 150–450)
POTASSIUM SERPL-SCNC: 3.4 MMOL/L (ref 3.4–5.3)
PROT SERPL-MCNC: 7.3 G/DL (ref 6.4–8.3)
RBC # BLD AUTO: 3.5 10E6/UL (ref 3.8–5.2)
RBC MORPH BLD: NORMAL
RBC URINE: 1 /HPF
RETICS # AUTO: 0.06 10E6/UL (ref 0.03–0.1)
RETICS/RBC NFR AUTO: 1.6 % (ref 0.5–2)
RSV RNA SPEC NAA+PROBE: NEGATIVE
SARS-COV-2 RNA RESP QL NAA+PROBE: NEGATIVE
SODIUM SERPL-SCNC: 141 MMOL/L (ref 135–145)
SP GR UR STRIP: 1.03 (ref 1–1.03)
SQUAMOUS EPITHELIAL: 9 /HPF
TROPONIN T SERPL HS-MCNC: <6 NG/L
TSH SERPL DL<=0.005 MIU/L-ACNC: 1.59 UIU/ML (ref 0.3–4.2)
UROBILINOGEN UR STRIP-MCNC: NORMAL MG/DL
WBC # BLD AUTO: 11.9 10E3/UL (ref 4–11)
WBC URINE: 2 /HPF

## 2024-11-06 PROCEDURE — 99451 NTRPROF PH1/NTRNET/EHR 5/>: CPT | Performed by: INTERNAL MEDICINE

## 2024-11-06 PROCEDURE — 93970 EXTREMITY STUDY: CPT | Mod: XS

## 2024-11-06 PROCEDURE — 93970 EXTREMITY STUDY: CPT

## 2024-11-06 PROCEDURE — 71046 X-RAY EXAM CHEST 2 VIEWS: CPT

## 2024-11-06 PROCEDURE — 81003 URINALYSIS AUTO W/O SCOPE: CPT | Performed by: EMERGENCY MEDICINE

## 2024-11-06 PROCEDURE — 99283 EMERGENCY DEPT VISIT LOW MDM: CPT | Mod: GC | Performed by: INTERNAL MEDICINE

## 2024-11-06 PROCEDURE — 87637 SARSCOV2&INF A&B&RSV AMP PRB: CPT | Performed by: EMERGENCY MEDICINE

## 2024-11-06 ASSESSMENT — ACTIVITIES OF DAILY LIVING (ADL)
ADLS_ACUITY_SCORE: 0

## 2024-11-06 NOTE — ED TRIAGE NOTES
Recent hospitalization and had follow up labs today; plts were high so clinic told pt to come to ED. Rec'd 2 units of blood while hospitalized. Pt says she was anemic and had low B12. Pt was abusing nitrous. Mom says pt used today but pt denies.     Triage Assessment (Adult)       Row Name 11/05/24 9120          Triage Assessment    Airway WDL WDL        Respiratory WDL    Respiratory WDL WDL        Skin Circulation/Temperature WDL    Skin Circulation/Temperature WDL WDL        Cardiac WDL    Cardiac WDL WDL        Peripheral/Neurovascular WDL    Peripheral Neurovascular WDL WDL        Cognitive/Neuro/Behavioral WDL    Cognitive/Neuro/Behavioral WDL WDL

## 2024-11-06 NOTE — ED PROVIDER NOTES
ED Provider Note  [facility]      History     Chief Complaint   Patient presents with    Abnormal Labs     Recent hospitalization and had follow up labs today; plts were high so clinic told pt to come to ED. Rec'd 2 units of blood while hospitalized. Pt says she was anemic and had low B12. Pt was abusing nitrous. Mom says pt used today but pt denies.     HPI  Camelia Corea is a 23 year old female with recent hospitalization for pancytopenia thought to be due to severe vitamin B12 deficiency due to lipid abuse who presents to the emergency department with thrombocytosis on follow-up labs today.  Patient had outpatient labs performed today which revealed platelet count of 1.2 million.  Her hemoglobin was improved to 7.7.  She had recent hospitalization during which severe B12 deficiency was treated with daily B12 injections until she was discharged on 10/27.  She was discharged on oral vitamin B12.  Patient followed up in clinic today and had the above results.  She was referred to the emergency department for evaluation.  She complains of dyspnea on exertion if she walks too fast.  She also complains of swelling of her hands and feet.  She denies any chest pain.  No fever.  No abdominal pain.  No nausea or vomiting.  Mother is concerned that she began using whippets again but patient denies.    Past Medical History  History reviewed. No pertinent past medical history.  History reviewed. No pertinent surgical history.  gabapentin (NEURONTIN) 100 MG capsule  melatonin 5 MG CAPS  pantoprazole (PROTONIX) 40 MG EC tablet  vitamin B-12 (CYANOCOBALAMIN) 100 MCG tablet      No Known Allergies  Family History  Family History   Problem Relation Age of Onset    Substance Abuse Sister     Substance Abuse Sister      Social History   Social History     Tobacco Use    Smoking status: Every Day     Types: Vaping Device    Smokeless tobacco: Never   Vaping Use    Vaping status: Some Days   Substance Use  Topics    Alcohol use: No    Drug use: Not Currently     Types: Marijuana, Fentanyl     Comment: abuses nitrous; last fentanyl/MJ 2023      A medically appropriate review of systems was performed with pertinent positives and negatives noted in the HPI, and all other systems negative.    Physical Exam   BP: 102/63  Pulse: 82  Temp: 97.9  F (36.6  C)  Resp: 16  Weight: 80.3 kg (177 lb)  SpO2: 100 %  Physical Exam  Vitals and nursing note reviewed.   Constitutional:       General: She is not in acute distress.     Appearance: Normal appearance. She is not diaphoretic.   HENT:      Head: Normocephalic and atraumatic.   Eyes:      Extraocular Movements: Extraocular movements intact.      Conjunctiva/sclera: Conjunctivae normal.   Cardiovascular:      Rate and Rhythm: Normal rate.      Heart sounds: Normal heart sounds.      Comments: Mild nonpitting edema of hands and feet.  Pulmonary:      Effort: Pulmonary effort is normal. No respiratory distress.      Breath sounds: Normal breath sounds.   Abdominal:      Palpations: Abdomen is soft.      Tenderness: There is no abdominal tenderness.   Musculoskeletal:         General: No tenderness. Normal range of motion.      Cervical back: Normal range of motion and neck supple.   Skin:     General: Skin is warm.      Capillary Refill: Capillary refill takes less than 2 seconds.      Findings: No rash.   Neurological:      General: No focal deficit present.      Mental Status: She is alert.      Cranial Nerves: No cranial nerve deficit.      Motor: No weakness.      Coordination: Coordination normal.      Gait: Gait normal.   Psychiatric:         Mood and Affect: Mood normal.           ED Course, Procedures, & Data      Procedures            EKG Interpretation:      Interpreted by JOSE ELIAS STEINER MD, MD  Time reviewed: 2338  Symptoms at time of EKG: SOA   Rhythm: normal sinus   Rate: 66  Axis: normal  Ectopy: none  Conduction: normal  ST Segments/ T Waves: No ST-T wave changes  Q  Waves: none  Comparison to prior: Unchanged    Clinical Impression: normal EKG           Results for orders placed or performed during the hospital encounter of 11/05/24   XR Chest 2 Views     Status: None    Narrative    EXAM: XR CHEST 2 VIEWS  LOCATION: [facility]  DATE: 11/6/2024    INDICATION: Short of air  COMPARISON: None.      Impression    IMPRESSION: Negative chest.   US Upper Extremity Venous Duplex Bilat     Status: None    Narrative    EXAM: US UPPER EXTREMITY VENOUS DUPLEX BILATERAL  LOCATION: [facility]  DATE: 11/6/2024    INDICATION: Swelling, thrombosis???evaluate for DVT  COMPARISON: None.  TECHNIQUE: Venous Duplex ultrasound of both upper extremities with (when possible) and without compression, augmentation, and duplex. Color flow and spectral Doppler with waveform analysis performed.    FINDINGS: Ultrasound includes evaluation of the internal jugular veins, innominate veins, subclavian veins, axillary veins, and brachial veins. The superficial cephalic and basilic veins were also evaluated where seen.    RIGHT: No deep venous thrombosis. No superficial thrombophlebitis.    LEFT: No deep venous thrombosis. No superficial thrombophlebitis.       Impression    IMPRESSION:  1.  No deep venous thrombosis in the bilateral upper extremities.   US Lower Extremity Venous Duplex Bilateral     Status: None    Narrative    EXAM: US LOWER EXTREMITY VENOUS DUPLEX BILATERAL  LOCATION: [facility]  DATE: 11/6/2024    INDICATION: Swelling, thrombosis???evaluate for DVT  COMPARISON: None.  TECHNIQUE: Venous Duplex ultrasound of bilateral lower extremities with and without compression, augmentation and duplex. Color flow and spectral Doppler with waveform analysis performed.    FINDINGS: Exam includes the common femoral, femoral, popliteal veins as well as segmentally visualized  deep calf veins and greater saphenous vein.     RIGHT: No deep vein thrombosis. No superficial thrombophlebitis. No popliteal cyst.    LEFT: No deep vein thrombosis. No superficial thrombophlebitis. No popliteal cyst.      Impression    IMPRESSION:  1.  No deep venous thrombosis in the bilateral lower extremities.   Comprehensive metabolic panel     Status: Abnormal   Result Value Ref Range    Sodium 141 135 - 145 mmol/L    Potassium 3.4 3.4 - 5.3 mmol/L    Carbon Dioxide (CO2) 25 22 - 29 mmol/L    Anion Gap 8 7 - 15 mmol/L    Urea Nitrogen 15.5 6.0 - 20.0 mg/dL    Creatinine 0.77 0.51 - 0.95 mg/dL    GFR Estimate >90 >60 mL/min/1.73m2    Calcium 9.0 8.8 - 10.4 mg/dL    Chloride 108 (H) 98 - 107 mmol/L    Glucose 110 (H) 70 - 99 mg/dL    Alkaline Phosphatase 70 40 - 150 U/L    AST 19 0 - 45 U/L    ALT 17 0 - 50 U/L    Protein Total 7.3 6.4 - 8.3 g/dL    Albumin 3.9 3.5 - 5.2 g/dL    Bilirubin Total 0.2 <=1.2 mg/dL   Troponin T, High Sensitivity     Status: Normal   Result Value Ref Range    Troponin T, High Sensitivity <6 <=14 ng/L   TSH with free T4 reflex     Status: Normal   Result Value Ref Range    TSH 1.59 0.30 - 4.20 uIU/mL   Nt probnp inpatient (BNP)     Status: Normal   Result Value Ref Range    N terminal Pro BNP Inpatient 84 0 - 450 pg/mL   HCG quantitative pregnancy     Status: Normal   Result Value Ref Range    hCG Quantitative <1 <5 mIU/mL   D dimer quantitative     Status: Normal   Result Value Ref Range    D-Dimer Quantitative 0.31 0.00 - 0.50 ug/mL FEU    Narrative    This D-dimer assay is intended for use in conjunction with a clinical pretest probability assessment model to exclude pulmonary embolism (PE) and deep venous thrombosis (DVT) in outpatients suspected of PE or DVT. The cut-off value is 0.50 ug/mL FEU.   CBC with platelets and differential     Status: Abnormal (Preliminary result)   Result Value Ref Range    WBC Count 11.9 (H) 4.0 - 11.0 10e3/uL    RBC Count 3.50 (L) 3.80 - 5.20 10e6/uL     Hemoglobin 9.4 (L) 11.7 - 15.7 g/dL    Hematocrit 30.3 (L) 35.0 - 47.0 %    MCV 87 78 - 100 fL    MCH 26.9 26.5 - 33.0 pg    MCHC 31.0 (L) 31.5 - 36.5 g/dL    RDW 23.9 (H) 10.0 - 15.0 %    Platelet Count 1,183 (HH) 150 - 450 10e3/uL   Reticulocyte count     Status: Normal   Result Value Ref Range    % Reticulocyte 1.6 0.5 - 2.0 %    Absolute Reticulocyte 0.055 0.025 - 0.095 10e6/uL   RBC and Platelet Morphology     Status: None   Result Value Ref Range    RBC Morphology Confirmed RBC Indices     Platelet Assessment  Automated Count Confirmed. Platelet morphology is normal.     Automated Count Confirmed. Platelet morphology is normal.   Lab Blood Morphology Pathologist Review     Status: Abnormal (In process)    Narrative    The following orders were created for panel order Lab Blood Morphology Pathologist Review.  Procedure                               Abnormality         Status                     ---------                               -----------         ------                     Bld morphology pathology...[[id number]]                                                 CBC with platelets and d...[[id number]]  Abnormal            Preliminary result         RBC and Platelet Morphology[[id number]]                      Final result               Reticulocyte count[[id number]]           Normal              Final result               Morphology Tracking[[id number]]                              Final result                 Please view results for these tests on the individual orders.     Medications - No data to display  Labs Ordered and Resulted from Time of ED Arrival to Time of ED Departure   COMPREHENSIVE METABOLIC PANEL - Abnormal       Result Value    Sodium 141      Potassium 3.4      Carbon Dioxide (CO2) 25      Anion Gap 8      Urea Nitrogen 15.5      Creatinine 0.77      GFR Estimate >90      Calcium 9.0      Chloride 108 (*)     Glucose 110 (*)     Alkaline Phosphatase 70      AST 19      ALT 17      Protein  Total 7.3      Albumin 3.9      Bilirubin Total 0.2     CBC WITH PLATELETS AND DIFFERENTIAL - Abnormal    WBC Count 11.9 (*)     RBC Count 3.50 (*)     Hemoglobin 9.4 (*)     Hematocrit 30.3 (*)     MCV 87      MCH 26.9      MCHC 31.0 (*)     RDW 23.9 (*)     Platelet Count 1,183 (*)    TROPONIN T, HIGH SENSITIVITY - Normal    Troponin T, High Sensitivity <6     TSH WITH FREE T4 REFLEX - Normal    TSH 1.59     NT PROBNP INPATIENT - Normal    N terminal Pro BNP Inpatient 84     HCG QUANTITATIVE PREGNANCY - Normal    hCG Quantitative <1     D DIMER QUANTITATIVE - Normal    D-Dimer Quantitative 0.31     RETICULOCYTE COUNT - Normal    % Reticulocyte 1.6      Absolute Reticulocyte 0.055     MORPHOLOGY TRACKING   RBC AND PLATELET MORPHOLOGY    RBC Morphology Confirmed RBC Indices      Platelet Assessment        Value: Automated Count Confirmed. Platelet morphology is normal.   ROUTINE UA WITH MICROSCOPIC REFLEX TO CULTURE   INFLUENZA A/B, RSV, & SARS-COV2 PCR   BLOOD MORPHOLOGY PATHOLOGIST REVIEW   LAB BLOOD MORPHOLOGY PATHOLOGIST REVIEW     US Lower Extremity Venous Duplex Bilateral   Final Result   IMPRESSION:   1.  No deep venous thrombosis in the bilateral lower extremities.      US Upper Extremity Venous Duplex Bilat   Final Result   IMPRESSION:   1.  No deep venous thrombosis in the bilateral upper extremities.      XR Chest 2 Views   Final Result   IMPRESSION: Negative chest.             Critical care was not performed.     Medical Decision Making  The patient's presentation was of moderate complexity (an undiagnosed new problem with uncertain prognosis).    The patient's evaluation involved:  review of external note(s) from 3+ sources (see separate area of note for details)  review of 3+ test result(s) ordered prior to this encounter (see separate area of note for details)  ordering and/or review of 3+ test(s) in this encounter (see separate area of note for details)  discussion of management or test  interpretation with another health professional (hematology fellow)    The patient's management necessitated further care after sign-out to Dr. Arzate (see their note for further management).    Assessment & Plan    23 year old female with recent hospitalization for pancytopenia due to severe B12 deficiency now here with thrombocytosis.  She does complain of some dyspnea on exertion but has normal EKG, troponin, BNP, negative D-dimer.  Chest radiograph is normal.  Do not suspect ACS, pulmonary embolism, pneumothorax, pneumonia.  The patient's hemoglobin is improved to 9.3.  She does have 1.187 million platelets on CBC.  No evidence for DVT on ultrasounds of her bilateral upper and lower extremities.  D-dimer negative so do not suspect pulmonary embolism.  Peripheral smear pending.  Discussed with hematology-recommended waiting for peripheral smear result-okay to discharge if no blasts.  Also recommended hematology consult in the morning-ordered.  Signed out at change of shift.    I have reviewed the nursing notes. I have reviewed the findings, diagnosis, plan and need for follow up with the patient.    New Prescriptions    No medications on file       Final diagnoses:   Thrombocytosis     Chart documentation was completed with Dragon voice-recognition software. Even though reviewed, this chart may still contain some grammatical, spelling, and word errors.     Jeff Butler Md    [facility] located within [facility] EMERGENCY DEPARTMENT  11/5/2024     Jeff Butler MD  11/06/24 0051       Jeff Butler MD  11/06/24 0057       Jeff Butler MD  11/06/24 0104

## 2024-11-06 NOTE — TELEPHONE ENCOUNTER
Hematology has been able to review patients labs from yesterday and clinical picture and says this is a normal response to the vitamin B12 being replaced. Not at risk with this platelet level.  We will recheck when Camelia is in for her physical in December. Thank you!  TRINO Jones

## 2024-11-06 NOTE — PROGRESS NOTES
11/6/2024     E-Consult has been accepted.    Interprofessional consultation requested by:  Lissette Velasquez APRN CNP      Clinical Question/Purpose: MY CLINICAL QUESTION IS: inpatient 10/22-10/27, presented with anemia hgb 7 and had heme consult while IP. Also discovered to be severely B12 deficient. Thought to be secondary to Whippet abuse. Thrombocytopenia at that time, but today's platelet count is 1257. I can order follow-up testing tomorrow to confirm and repeat peripheral smear, but appreciate any guidance.     Patient assessment and information reviewed: 23 YOF with severe Vit B12 deficiency now with rebound thrombocytosis of no clinical significance.     Secondary thrombocytosis does not predispose patients to thrombosis so there is no concern about this normal bone marrow rebound after vitamin B12 replacement.  Continue Vitamin B12 replacement    Recommendations: No change in therapy.  Ok to stop vitamin B12 once anemia is completely resolved (and assuming her abuse of NO is in remission) because we suspect this is all related to NO abuse.         The recommendations provided in this E-Consult are based on a review of clinical data pertinent to the clinical question presented, without a review of the patient's complete medical record or, the benefit of a comprehensive in-person or virtual patient evaluation. This consultation should not replace the clinical judgement and evaluation of the provider ordering this E-Consult. Any new clinical issues, or changes in patient status since the filing of this E-Consult will need to be taken into account when assessing these recommendations. Please contact me if you have further questions.    My total time spent reviewing clinical information and formulating assessment was 5 minutes.        Momo Hayes MD

## 2024-11-06 NOTE — ED PROVIDER NOTES
Emergency Department I-PASS Sign-out      Illness Severity: Stable    Patient Summary:  23 year old female with pertinent PMH of pancytopenia thought due to severe B12 deficiency related to lipid abuse who presented with thrombocytosis after B12 replacement by injection and now p.o.    ED Course/treatment plan: DVT, ACS workup Negative    Clinical Impression:  (D75.839) Thrombocytosis      Edited by: Jeff Butler MD at 11/6/2024 0055    Action List:  -To do:    Labs: Peripheral smear, UA, Covid/flu  Hematology consult ordered    Situational Awareness & Contingency Planning:  Code Status (Most recent):  Prior    Disposition: Probable discharge if no blasts      Edited by: Jeff Butler MD at 11/6/2024 0055    Synthesis & Events after sign-out:  The patient was signed out to me by .  Patient is awaiting hematology consult and results of final peripheral smear.  There is a recent history of pancytopenia, and now was seen 2 shifts ago for dyspnea with negative workup with the exception of thrombocytosis.  Has remained very stable in the ED overnight.  Seen by the hematology service.  They would recommend reducing her dosage of B12 to one half the regular dose.  They are not otherwise concerned.  She should have her platelets rechecked in 3 to 4 days and they are recommending primary care follow-up.  Patient is feeling well, requesting discharge.  A primary care referral was placed.  We discussed the indications for emergency department return and follow-up.  Stable for discharge.          Pete Decker MD   Emergency Medicine     Pete Decker MD  11/06/24 4734

## 2024-11-06 NOTE — PROGRESS NOTES
Brief Hematology Fellow Note    Paged for thrombocytosis     She was recently admitted for mental health concerns and was found to have pancytopenia, suspected to be in the setting of B12 deficiency after broad work up.     On follow up labs in clinic FTH platelets 1183 from 72 and 43 previously     Reassuring she is hemodynamically stable, on room air and asymptomatic from history from ED.     Would recommend repeating CBC with diff, and follow peripheral smear. Agree with DVT ultrasounds. D dimer negative.     Not sure what's driving this, she does have significant substance use history including nitrous oxide abuse, so I wonder if it is  secondary to that.  No signs of infection  to think about reactive, also no B symptoms.  Otherwise may be the b12 supplement ? But seems unlikely. She just had iron studies on 10/22 with ferritin 160, but can repeat  ferritin with morning labs and consult hematology on AM.     Please page with question or if developing symptoms.     Elizabet Morgan MD  Hematology and Medical Oncology Fellow, PGY-4  Pager:  [phone]

## 2024-11-06 NOTE — DISCHARGE INSTRUCTIONS
Thank you for choosing [facility].     Please closely monitor for further symptoms. Return to the Emergency Department if you develop any new or worsening signs or symptoms.    If you received any opiate pain medications or sedatives during your visit, please do not drive for at least 8 hours.     Labs, cultures or final xray interpretations may still need to be reviewed.  We will call you if your plan of care needs to be changed.    Reduce your B12 dosage in half as discussed with hematology.

## 2024-11-06 NOTE — CONSULTS
Hematology Consult Note   Date of Service: 11/06/2024    Patient: Camelia Corea  MRN: [id number]  Admission Date: 11/5/2024  Hospital Day # Hospital Day: 2  Primary Outpatient Hematologist: dr. Hayes    Reason for Consult: Thrombocytosis    History of Present Illness:    Camelia Corea is a 23-year-old female with a history of nitrous oxide abuse (Whippets), neuropathy, and recent hospitalization for pancytopenia, likely due to vitamin B12 deficiency, was discharged on October 27th with vitamin B12 supplements. At a follow-up appointment with her primary care provider yesterday, she presented with significantly elevated platelet counts, approximately 1,257,000, and symptoms of exertional dyspnea and hand swelling. Due to these findings, she was referred to the emergency department for further evaluation.    Today, the patient reports substantial improvement in her shortness of breath and states that the swelling in her hands has completely resolved. She denies experiencing headaches, vision changes, or any new issues with bleeding or bruising. While numbness in her fingers has resolved, she continues to experience tingling and residual numbness in her feet. The patient affirms that she has abstained from using Whippets or any drugs since her recent discharge, although she admits to vaping during this period.    Review of Systems: Pertinent positive and negative systems described in HPI; the remainder of the 14 systems are negative    Past Medical History:  Neuropathy      Past Surgical History:  History reviewed. No pertinent surgical history.    Social History:  Social History     Socioeconomic History    Marital status: Single     Spouse name: None    Number of children: None    Years of education: None    Highest education level: None   Tobacco Use    Smoking status: Every Day     Types: Vaping Device    Smokeless tobacco: Never   Vaping Use    Vaping status: Some Days   Substance and Sexual Activity    Alcohol  use: No    Drug use: Not Currently     Types: Marijuana, Fentanyl     Comment: abuses nitrous; last fentanyl/MJ 2023     Social Drivers of Health     Financial Resource Strain: Low Risk  (10/23/2024)    Financial Resource Strain     Within the past 12 months, have you or your family members you live with been unable to get utilities (heat, electricity) when it was really needed?: No   Food Insecurity: Low Risk  (10/23/2024)    Food Insecurity     Within the past 12 months, did you worry that your food would run out before you got money to buy more?: No     Within the past 12 months, did the food you bought just not last and you didn t have money to get more?: No   Transportation Needs: Low Risk  (10/23/2024)    Transportation Needs     Within the past 12 months, has lack of transportation kept you from medical appointments, getting your medicines, non-medical meetings or appointments, work, or from getting things that you need?: No   Physical Activity: Not on File (7/13/2021)    Received from [facility]    Physical Activity     Physical Activity: 0   Stress: Not on File (7/13/2021)    Received from [facility]    Stress     Stress: 0   Social Connections: Not on File (7/13/2021)    Received from [facility]    Social Connections     Social Connections and Isolation: 0   Interpersonal Safety: Low Risk  (10/23/2024)    Interpersonal Safety     Do you feel physically and emotionally safe where you currently live?: Yes     Within the past 12 months, have you been hit, slapped, kicked or otherwise physically hurt by someone?: No     Within the past 12 months, have you been humiliated or emotionally abused in other ways by your partner or ex-partner?: No   Housing Stability: Low Risk  (10/23/2024)    Housing Stability     Do you have housing? : Yes     Are you worried about losing your housing?: No     -Patient works at a .       Family History  Family History   Problem Relation Age of Onset    Substance Abuse  Sister     Substance Abuse Sister        Outpatient Medications:  No current facility-administered medications for this encounter.     Current Outpatient Medications   Medication Sig Dispense Refill    gabapentin (NEURONTIN) 100 MG capsule Take 1 capsule (100 mg) by mouth 3 times daily. 90 capsule 1    melatonin 5 MG CAPS Take by mouth.      pantoprazole (PROTONIX) 40 MG EC tablet Take 1 tablet (40 mg) by mouth 2 times daily (before meals). 60 tablet 0    vitamin B-12 (CYANOCOBALAMIN) 100 MCG tablet Take 1 tablet (100 mcg) by mouth daily. 90 tablet 3        Physical Exam:    BP 95/58   Pulse 96   Temp 97.9  F (36.6  C) (Oral)   Resp 16   Wt 80.3 kg (177 lb)   LMP 10/22/2024 (Within Days)   SpO2 98%   BMI 28.57 kg/m      Gen: Well appearing, in NAD  HEENT: EOMI,  mmm, oropharynx clear  CV: Normal rate, regular rhythm. No m/r/g  Pulm: CTAB, no wheezing, normal work of breathing  Abd: Soft, nt/nd, no rebound/guarding  Ext: Warm and well perfused. No lower extremity edema  Skin: No rash, cyanosis or petechial lesion  Neuro: Alert and answering questions appropriately.  Neurological examination is grossly intact.      Labs & Studies: I personally reviewed the following studies:  ROUTINE LABS (Last four results):  CMP  Recent Labs   Lab 11/05/24  2347      POTASSIUM 3.4   CHLORIDE 108*   CO2 25   ANIONGAP 8   *   BUN 15.5   CR 0.77   GFRESTIMATED >90   LIANA 9.0   PROTTOTAL 7.3   ALBUMIN 3.9   BILITOTAL 0.2   ALKPHOS 70   AST 19   ALT 17     CBC  Recent Labs   Lab 11/05/24  2347 11/05/24  1209   WBC 11.9* 10.0   RBC 3.50* 3.74*   HGB 9.4* 9.9*   HCT 30.3* 32.2*   MCV 87 86   MCH 26.9 26.5   MCHC 31.0* 30.7*   RDW 23.9* 23.9*   PLT 1,183* 1,257*     INRNo lab results found in last 7 days.    Assessment & Plan:   Camelia Corea is a 23-year-old female with a history of nitrous oxide abuse (WhippGenVault), neuropathy, and recent hospitalization for pancytopenia, likely due to vitamin B12 deficiency, was discharged  on October 27th with vitamin B12 supplements. At a follow-up appointment with her primary care provider yesterday, she presented with significantly elevated platelet counts, approximately 1,257,000, and symptoms of exertional dyspnea and hand swelling. Due to these findings, she was referred to the emergency department for further evaluation.    #Thrombocytosis:  Likely rebound thrombocytosis following vitamin B12 supplementation. The patient currently exhibits no symptoms of concern such as bleeding, vision changes or headaches. Monitoring will continue with plans to:  - Repeat CBC in one week to assess improvement.    #Vitamin B12 deficiency  #Pancytopenia, suspect secondary to above:  The patient s vitamin B12 deficiency, believed to be induced by nitrous oxide abuse from Whippet usage, was addressed during her recent hospitalization with parenteral vitamin B12 supplements. She was discharged on a daily regimen of 1000 mcg of vitamin B12. Pancytopenia is showing signs of improvement, and recent tests indicate a shift to reactive thrombocytosis. Her vitamin B12 levels have improved to 527, Will recommend to reduce vitamin B12 dose  to 500 mcg daily.    #Exertional dyspnea  #Bilateral hand swelling:  Doppler studies of both the upper and lower extremities were negative for deep vein thrombosis. Although pulmonary embolism was considered, the patient s normal D-dimer levels and marked improvement in respiratory symptoms led to the decision against further imaging studies.      Recommendations:   - Repeat CBC in 1 week to assess improvement  - Consider reducing vitamin B12 dose to 500 mcg daily  - If thrombocytosis persists, consider evaluating for acquired von Willebrand disease. Further outpatient follow-up with the hematology team may be necessary.    The patient was seen, and the plan of care was discussed with the attending physician, Dr. Barr.    Thank you for involving us in taking care of this interesting  patient.  At this point we will sign off. Please don't hesitate to contact the Fellow On-Call with questions.      Brennan Flores MD  Hematology and Medical Oncology Fellow, PGY-4  [facility]  Pager: [phone]

## 2024-11-06 NOTE — TELEPHONE ENCOUNTER
Called pt and was unable to leave a message. Did not go to voicemail. Will attempt to call pt again. Thanks    Jenn Renee RN  [facility]

## 2024-11-07 NOTE — TELEPHONE ENCOUNTER
Pt called back  Relayed results note   Pt scheduled with PCP 12/3    Thanks  Kim SAMUEL RN  [facility]

## 2024-12-21 ENCOUNTER — APPOINTMENT (OUTPATIENT)
Dept: MRI IMAGING | Facility: CLINIC | Age: 24
DRG: 690 | End: 2024-12-21
Attending: FAMILY MEDICINE

## 2024-12-21 ENCOUNTER — HOSPITAL ENCOUNTER (INPATIENT)
Facility: CLINIC | Age: 24
LOS: 3 days | Discharge: HOME | DRG: 690 | End: 2024-12-24
Attending: FAMILY MEDICINE | Admitting: INTERNAL MEDICINE

## 2024-12-21 DIAGNOSIS — F41.9 ANXIETY: ICD-10-CM

## 2024-12-21 DIAGNOSIS — R20.0 NUMBNESS: ICD-10-CM

## 2024-12-21 DIAGNOSIS — N89.8 DISCHARGE FROM THE VAGINA: ICD-10-CM

## 2024-12-21 DIAGNOSIS — N39.0 URINARY TRACT INFECTION WITHOUT HEMATURIA, SITE UNSPECIFIED: ICD-10-CM

## 2024-12-21 DIAGNOSIS — D64.9 ACUTE ON CHRONIC ANEMIA: ICD-10-CM

## 2024-12-21 DIAGNOSIS — E53.8 VITAMIN B 12 DEFICIENCY: ICD-10-CM

## 2024-12-21 DIAGNOSIS — F19.10 SUBSTANCE ABUSE (H): ICD-10-CM

## 2024-12-21 DIAGNOSIS — R53.1 WEAKNESS: Primary | ICD-10-CM

## 2024-12-21 LAB
ABO + RH BLD: NORMAL
ALBUMIN SERPL BCG-MCNC: 3.7 G/DL (ref 3.5–5.2)
ALBUMIN UR-MCNC: 200 MG/DL
ALP SERPL-CCNC: 71 U/L (ref 40–150)
ALT SERPL W P-5'-P-CCNC: 38 U/L (ref 0–50)
AMPHETAMINES UR QL SCN: NORMAL
ANION GAP SERPL CALCULATED.3IONS-SCNC: 7 MMOL/L (ref 7–15)
APPEARANCE UR: ABNORMAL
AST SERPL W P-5'-P-CCNC: 36 U/L (ref 0–45)
BACTERIA #/AREA URNS HPF: ABNORMAL /HPF
BARBITURATES UR QL SCN: NORMAL
BASOPHILS # BLD MANUAL: 0 10E3/UL (ref 0–0.2)
BASOPHILS NFR BLD MANUAL: 0 %
BENZODIAZ UR QL SCN: NORMAL
BILIRUB SERPL-MCNC: 0.3 MG/DL
BILIRUB UR QL STRIP: NEGATIVE
BLD GP AB SCN SERPL QL: NEGATIVE
BLD PROD TYP BPU: NORMAL
BLOOD COMPONENT TYPE: NORMAL
BUN SERPL-MCNC: 21.5 MG/DL (ref 6–20)
BZE UR QL SCN: NORMAL
CALCIUM SERPL-MCNC: 8.8 MG/DL (ref 8.8–10.4)
CANNABINOIDS UR QL SCN: NORMAL
CHLORIDE SERPL-SCNC: 110 MMOL/L (ref 98–107)
CLUE CELLS: PRESENT
CODING SYSTEM: NORMAL
COLOR UR AUTO: YELLOW
CREAT SERPL-MCNC: 0.71 MG/DL (ref 0.51–0.95)
CROSSMATCH: NORMAL
CRP SERPL-MCNC: 8.02 MG/L
DACRYOCYTES BLD QL SMEAR: SLIGHT
EGFRCR SERPLBLD CKD-EPI 2021: >90 ML/MIN/1.73M2
ELLIPTOCYTES BLD QL SMEAR: SLIGHT
EOSINOPHIL # BLD MANUAL: 0.1 10E3/UL (ref 0–0.7)
EOSINOPHIL NFR BLD MANUAL: 1 %
ERYTHROCYTE [DISTWIDTH] IN BLOOD BY AUTOMATED COUNT: 26.5 % (ref 10–15)
ERYTHROCYTE [SEDIMENTATION RATE] IN BLOOD BY WESTERGREN METHOD: 46 MM/HR (ref 0–20)
ETHANOL SERPL-MCNC: <0.01 G/DL
FENTANYL UR QL: NORMAL
FLUAV RNA SPEC QL NAA+PROBE: NEGATIVE
FLUBV RNA RESP QL NAA+PROBE: NEGATIVE
FOLATE SERPL-MCNC: 7.4 NG/ML (ref 4.6–34.8)
FRAGMENTS BLD QL SMEAR: SLIGHT
GLUCOSE SERPL-MCNC: 135 MG/DL (ref 70–99)
GLUCOSE UR STRIP-MCNC: 30 MG/DL
HCG UR QL: NEGATIVE
HCO3 SERPL-SCNC: 27 MMOL/L (ref 22–29)
HCT VFR BLD AUTO: 21.5 % (ref 35–47)
HGB BLD-MCNC: 6.3 G/DL (ref 11.7–15.7)
HGB BLD-MCNC: 8.1 G/DL (ref 11.7–15.7)
HGB UR QL STRIP: ABNORMAL
HIV 1+2 AB+HIV1 P24 AG SERPL QL IA: NONREACTIVE
IRON BINDING CAPACITY (ROCHE): 190 UG/DL (ref 240–430)
IRON SATN MFR SERPL: 17 % (ref 15–46)
IRON SERPL-MCNC: 33 UG/DL (ref 37–145)
ISSUE DATE AND TIME: NORMAL
KETONES UR STRIP-MCNC: ABNORMAL MG/DL
LDH SERPL L TO P-CCNC: 1197 U/L (ref 0–250)
LEUKOCYTE ESTERASE UR QL STRIP: ABNORMAL
LYMPHOCYTES # BLD MANUAL: 3.5 10E3/UL (ref 0.8–5.3)
LYMPHOCYTES NFR BLD MANUAL: 52 %
MCH RBC QN AUTO: 26.8 PG (ref 26.5–33)
MCHC RBC AUTO-ENTMCNC: 29.3 G/DL (ref 31.5–36.5)
MCV RBC AUTO: 92 FL (ref 78–100)
METAMYELOCYTES # BLD MANUAL: 0.1 10E3/UL
METAMYELOCYTES NFR BLD MANUAL: 2 %
MONOCYTES # BLD MANUAL: 0.9 10E3/UL (ref 0–1.3)
MONOCYTES NFR BLD MANUAL: 14 %
MUCOUS THREADS #/AREA URNS LPF: PRESENT /LPF
MYELOCYTES # BLD MANUAL: 0.3 10E3/UL
MYELOCYTES NFR BLD MANUAL: 5 %
NEUTROPHILS # BLD MANUAL: 1.7 10E3/UL (ref 1.6–8.3)
NEUTROPHILS NFR BLD MANUAL: 25 %
NITRATE UR QL: POSITIVE
OPIATES UR QL SCN: NORMAL
PCP QUAL URINE (ROCHE): NORMAL
PH UR STRIP: 6 [PH] (ref 5–7)
PLAT MORPH BLD: ABNORMAL
PLATELET # BLD AUTO: 98 10E3/UL (ref 150–450)
POLYCHROMASIA BLD QL SMEAR: SLIGHT
POTASSIUM SERPL-SCNC: 4.8 MMOL/L (ref 3.4–5.3)
PROT SERPL-MCNC: 6.8 G/DL (ref 6.4–8.3)
RBC # BLD AUTO: 2.35 10E6/UL (ref 3.8–5.2)
RBC MORPH BLD: ABNORMAL
RBC URINE: >182 /HPF
RSV RNA SPEC NAA+PROBE: NEGATIVE
SARS-COV-2 RNA RESP QL NAA+PROBE: NEGATIVE
SODIUM SERPL-SCNC: 144 MMOL/L (ref 135–145)
SP GR UR STRIP: 1.03 (ref 1–1.03)
SPECIMEN EXP DATE BLD: NORMAL
SQUAMOUS EPITHELIAL: 1 /HPF
TRICHOMONAS, WET PREP: ABNORMAL
TSH SERPL DL<=0.005 MIU/L-ACNC: 2.84 UIU/ML (ref 0.3–4.2)
UNIT ABO/RH: NORMAL
UNIT NUMBER: NORMAL
UNIT STATUS: NORMAL
UNIT TYPE ISBT: 5100
UROBILINOGEN UR STRIP-MCNC: 3 MG/DL
VIT B12 SERPL-MCNC: 157 PG/ML (ref 232–1245)
WBC # BLD AUTO: 6.7 10E3/UL (ref 4–11)
WBC CLUMPS #/AREA URNS HPF: PRESENT /HPF
WBC URINE: >182 /HPF
WBC'S/HIGH POWER FIELD, WET PREP: ABNORMAL
YEAST, WET PREP: ABNORMAL

## 2024-12-21 PROCEDURE — 82746 ASSAY OF FOLIC ACID SERUM: CPT

## 2024-12-21 PROCEDURE — 86140 C-REACTIVE PROTEIN: CPT | Performed by: FAMILY MEDICINE

## 2024-12-21 PROCEDURE — 83550 IRON BINDING TEST: CPT

## 2024-12-21 PROCEDURE — 84443 ASSAY THYROID STIM HORMONE: CPT | Performed by: FAMILY MEDICINE

## 2024-12-21 PROCEDURE — 85018 HEMOGLOBIN: CPT | Performed by: FAMILY MEDICINE

## 2024-12-21 PROCEDURE — 250N000013 HC RX MED GY IP 250 OP 250 PS 637: Performed by: FAMILY MEDICINE

## 2024-12-21 PROCEDURE — 86923 COMPATIBILITY TEST ELECTRIC: CPT | Performed by: FAMILY MEDICINE

## 2024-12-21 PROCEDURE — 87186 SC STD MICRODIL/AGAR DIL: CPT | Performed by: FAMILY MEDICINE

## 2024-12-21 PROCEDURE — 81025 URINE PREGNANCY TEST: CPT | Performed by: FAMILY MEDICINE

## 2024-12-21 PROCEDURE — 87389 HIV-1 AG W/HIV-1&-2 AB AG IA: CPT

## 2024-12-21 PROCEDURE — 99285 EMERGENCY DEPT VISIT HI MDM: CPT | Mod: 25 | Performed by: FAMILY MEDICINE

## 2024-12-21 PROCEDURE — 81003 URINALYSIS AUTO W/O SCOPE: CPT | Performed by: FAMILY MEDICINE

## 2024-12-21 PROCEDURE — 86900 BLOOD TYPING SEROLOGIC ABO: CPT | Performed by: FAMILY MEDICINE

## 2024-12-21 PROCEDURE — 85018 HEMOGLOBIN: CPT

## 2024-12-21 PROCEDURE — 84425 ASSAY OF VITAMIN B-1: CPT

## 2024-12-21 PROCEDURE — 86592 SYPHILIS TEST NON-TREP QUAL: CPT

## 2024-12-21 PROCEDURE — 120N000002 HC R&B MED SURG/OB UMMC

## 2024-12-21 PROCEDURE — 87491 CHLMYD TRACH DNA AMP PROBE: CPT | Performed by: FAMILY MEDICINE

## 2024-12-21 PROCEDURE — 82607 VITAMIN B-12: CPT | Performed by: FAMILY MEDICINE

## 2024-12-21 PROCEDURE — 36415 COLL VENOUS BLD VENIPUNCTURE: CPT

## 2024-12-21 PROCEDURE — 82077 ASSAY SPEC XCP UR&BREATH IA: CPT | Performed by: FAMILY MEDICINE

## 2024-12-21 PROCEDURE — 87210 SMEAR WET MOUNT SALINE/INK: CPT | Performed by: FAMILY MEDICINE

## 2024-12-21 PROCEDURE — 250N000011 HC RX IP 250 OP 636: Performed by: FAMILY MEDICINE

## 2024-12-21 PROCEDURE — 250N000013 HC RX MED GY IP 250 OP 250 PS 637

## 2024-12-21 PROCEDURE — 258N000003 HC RX IP 258 OP 636

## 2024-12-21 PROCEDURE — 85652 RBC SED RATE AUTOMATED: CPT | Performed by: FAMILY MEDICINE

## 2024-12-21 PROCEDURE — 99285 EMERGENCY DEPT VISIT HI MDM: CPT | Performed by: FAMILY MEDICINE

## 2024-12-21 PROCEDURE — 96365 THER/PROPH/DIAG IV INF INIT: CPT | Performed by: FAMILY MEDICINE

## 2024-12-21 PROCEDURE — 85007 BL SMEAR W/DIFF WBC COUNT: CPT | Performed by: FAMILY MEDICINE

## 2024-12-21 PROCEDURE — 72148 MRI LUMBAR SPINE W/O DYE: CPT

## 2024-12-21 PROCEDURE — 99223 1ST HOSP IP/OBS HIGH 75: CPT | Mod: FS | Performed by: INTERNAL MEDICINE

## 2024-12-21 PROCEDURE — 36415 COLL VENOUS BLD VENIPUNCTURE: CPT | Performed by: FAMILY MEDICINE

## 2024-12-21 PROCEDURE — 80307 DRUG TEST PRSMV CHEM ANLYZR: CPT | Performed by: FAMILY MEDICINE

## 2024-12-21 PROCEDURE — 80053 COMPREHEN METABOLIC PANEL: CPT | Performed by: FAMILY MEDICINE

## 2024-12-21 PROCEDURE — 87637 SARSCOV2&INF A&B&RSV AMP PRB: CPT

## 2024-12-21 PROCEDURE — 83615 LACTATE (LD) (LDH) ENZYME: CPT

## 2024-12-21 PROCEDURE — P9016 RBC LEUKOCYTES REDUCED: HCPCS | Performed by: FAMILY MEDICINE

## 2024-12-21 PROCEDURE — 99207 PR APP CREDIT; MD BILLING SHARED VISIT: CPT | Mod: FS

## 2024-12-21 RX ORDER — LIDOCAINE 40 MG/G
CREAM TOPICAL
Status: DISCONTINUED | OUTPATIENT
Start: 2024-12-21 | End: 2024-12-24 | Stop reason: HOSPADM

## 2024-12-21 RX ORDER — AMOXICILLIN 250 MG
1 CAPSULE ORAL 2 TIMES DAILY PRN
Status: DISCONTINUED | OUTPATIENT
Start: 2024-12-21 | End: 2024-12-24 | Stop reason: HOSPADM

## 2024-12-21 RX ORDER — METRONIDAZOLE 500 MG/1
500 TABLET ORAL 2 TIMES DAILY
Status: DISCONTINUED | OUTPATIENT
Start: 2024-12-21 | End: 2024-12-21

## 2024-12-21 RX ORDER — AMOXICILLIN 250 MG
2 CAPSULE ORAL 2 TIMES DAILY PRN
Status: DISCONTINUED | OUTPATIENT
Start: 2024-12-21 | End: 2024-12-24 | Stop reason: HOSPADM

## 2024-12-21 RX ORDER — LORAZEPAM 1 MG/1
1 TABLET ORAL ONCE
Status: COMPLETED | OUTPATIENT
Start: 2024-12-21 | End: 2024-12-21

## 2024-12-21 RX ORDER — CALCIUM CARBONATE 500 MG/1
1000 TABLET, CHEWABLE ORAL 4 TIMES DAILY PRN
Status: DISCONTINUED | OUTPATIENT
Start: 2024-12-21 | End: 2024-12-24 | Stop reason: HOSPADM

## 2024-12-21 RX ORDER — CYANOCOBALAMIN 1000 UG/ML
1000 INJECTION, SOLUTION INTRAMUSCULAR; SUBCUTANEOUS ONCE
Status: COMPLETED | OUTPATIENT
Start: 2024-12-21 | End: 2024-12-22

## 2024-12-21 RX ORDER — OLANZAPINE 10 MG/2ML
2.5 INJECTION, POWDER, FOR SOLUTION INTRAMUSCULAR DAILY PRN
Status: DISCONTINUED | OUTPATIENT
Start: 2024-12-21 | End: 2024-12-24 | Stop reason: HOSPADM

## 2024-12-21 RX ORDER — METRONIDAZOLE 500 MG/1
500 TABLET ORAL 2 TIMES DAILY
Status: DISCONTINUED | OUTPATIENT
Start: 2024-12-21 | End: 2024-12-24 | Stop reason: HOSPADM

## 2024-12-21 RX ORDER — OLANZAPINE 5 MG/1
5 TABLET, ORALLY DISINTEGRATING ORAL ONCE
Status: COMPLETED | OUTPATIENT
Start: 2024-12-21 | End: 2024-12-21

## 2024-12-21 RX ORDER — CYANOCOBALAMIN 1000 UG/ML
1000 INJECTION, SOLUTION INTRAMUSCULAR; SUBCUTANEOUS DAILY
Status: COMPLETED | OUTPATIENT
Start: 2024-12-22 | End: 2024-12-24

## 2024-12-21 RX ORDER — UBIDECARENONE 75 MG
100 CAPSULE ORAL DAILY
Status: DISCONTINUED | OUTPATIENT
Start: 2024-12-22 | End: 2024-12-21

## 2024-12-21 RX ORDER — ONDANSETRON 2 MG/ML
4 INJECTION INTRAMUSCULAR; INTRAVENOUS EVERY 6 HOURS PRN
Status: DISCONTINUED | OUTPATIENT
Start: 2024-12-21 | End: 2024-12-24 | Stop reason: HOSPADM

## 2024-12-21 RX ORDER — ACETAMINOPHEN 650 MG/1
650 SUPPOSITORY RECTAL EVERY 4 HOURS PRN
Status: DISCONTINUED | OUTPATIENT
Start: 2024-12-21 | End: 2024-12-24 | Stop reason: HOSPADM

## 2024-12-21 RX ORDER — CEFTRIAXONE 1 G/1
1 INJECTION, POWDER, FOR SOLUTION INTRAMUSCULAR; INTRAVENOUS ONCE
Status: COMPLETED | OUTPATIENT
Start: 2024-12-21 | End: 2024-12-21

## 2024-12-21 RX ORDER — HYDROXYZINE HYDROCHLORIDE 50 MG/1
50 TABLET, FILM COATED ORAL EVERY 6 HOURS PRN
Status: DISCONTINUED | OUTPATIENT
Start: 2024-12-21 | End: 2024-12-24 | Stop reason: HOSPADM

## 2024-12-21 RX ORDER — GABAPENTIN 100 MG/1
100 CAPSULE ORAL 3 TIMES DAILY
Status: DISCONTINUED | OUTPATIENT
Start: 2024-12-21 | End: 2024-12-24 | Stop reason: HOSPADM

## 2024-12-21 RX ORDER — ACETAMINOPHEN 325 MG/1
650 TABLET ORAL EVERY 4 HOURS PRN
Status: DISCONTINUED | OUTPATIENT
Start: 2024-12-21 | End: 2024-12-24 | Stop reason: HOSPADM

## 2024-12-21 RX ORDER — HYDROXYZINE HYDROCHLORIDE 25 MG/1
25 TABLET, FILM COATED ORAL EVERY 6 HOURS PRN
Status: DISCONTINUED | OUTPATIENT
Start: 2024-12-21 | End: 2024-12-24 | Stop reason: HOSPADM

## 2024-12-21 RX ORDER — POLYETHYLENE GLYCOL 3350 17 G/17G
17 POWDER, FOR SOLUTION ORAL 2 TIMES DAILY PRN
Status: DISCONTINUED | OUTPATIENT
Start: 2024-12-21 | End: 2024-12-24 | Stop reason: HOSPADM

## 2024-12-21 RX ORDER — CEFTRIAXONE 1 G/1
1 INJECTION, POWDER, FOR SOLUTION INTRAMUSCULAR; INTRAVENOUS EVERY 24 HOURS
Status: DISCONTINUED | OUTPATIENT
Start: 2024-12-22 | End: 2024-12-24 | Stop reason: HOSPADM

## 2024-12-21 RX ORDER — PROCHLORPERAZINE MALEATE 5 MG/1
10 TABLET ORAL EVERY 6 HOURS PRN
Status: DISCONTINUED | OUTPATIENT
Start: 2024-12-21 | End: 2024-12-24 | Stop reason: HOSPADM

## 2024-12-21 RX ORDER — ONDANSETRON 4 MG/1
4 TABLET, ORALLY DISINTEGRATING ORAL EVERY 6 HOURS PRN
Status: DISCONTINUED | OUTPATIENT
Start: 2024-12-21 | End: 2024-12-24 | Stop reason: HOSPADM

## 2024-12-21 RX ADMIN — GABAPENTIN 100 MG: 100 CAPSULE ORAL at 21:02

## 2024-12-21 RX ADMIN — METRONIDAZOLE 500 MG: 500 TABLET ORAL at 21:02

## 2024-12-21 RX ADMIN — LORAZEPAM 1 MG: 1 TABLET ORAL at 15:07

## 2024-12-21 RX ADMIN — SODIUM CHLORIDE, POTASSIUM CHLORIDE, SODIUM LACTATE AND CALCIUM CHLORIDE 500 ML: 600; 310; 30; 20 INJECTION, SOLUTION INTRAVENOUS at 21:02

## 2024-12-21 RX ADMIN — OLANZAPINE 5 MG: 5 TABLET, ORALLY DISINTEGRATING ORAL at 15:07

## 2024-12-21 RX ADMIN — CEFTRIAXONE SODIUM 1 G: 1 INJECTION, POWDER, FOR SOLUTION INTRAMUSCULAR; INTRAVENOUS at 15:38

## 2024-12-21 ASSESSMENT — ACTIVITIES OF DAILY LIVING (ADL)
ADLS_ACUITY_SCORE: 53
ADLS_ACUITY_SCORE: 47
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
## 2024-12-21 NOTE — MEDICATION SCRIBE - ADMISSION MEDICATION HISTORY
Medication Scribe Admission Medication History    Admission medication history is complete. The information provided in this note is only as accurate as the sources available at the time of the update.    Information Source(s): Patient and CareEverywhere/SureScripts via in-person    Pertinent Information: N/A    Changes made to PTA medication list:  Added: None  Deleted: None  Changed: None    Allergies reviewed with patient and updates made in EHR: yes    Medication History Completed By: Kandice Montoya 12/21/2024 5:52 PM    PTA Med List   Medication Sig Last Dose/Taking    gabapentin (NEURONTIN) 100 MG capsule Take 1 capsule (100 mg) by mouth 3 times daily. Past Week    melatonin 5 MG CAPS Take 5 mg by mouth nightly as needed. 12/20/2024 Bedtime    pantoprazole (PROTONIX) 40 MG EC tablet Take 1 tablet (40 mg) by mouth 2 times daily (before meals). Past Week    vitamin B-12 (CYANOCOBALAMIN) 100 MCG tablet Take 1 tablet (100 mcg) by mouth daily. 12/21/2024 Morning
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
## 2024-12-21 NOTE — ED NOTES
Bed: UREDH-J  Expected date: 12/21/24  Expected time: 1:21 PM  Means of arrival: Ambulance  Comments:  [facility]  to triage 25yo female, weakness, chronic issue

## 2024-12-21 NOTE — ED PROVIDER NOTES
ED Provider Note  [facility]      History     Chief Complaint   Patient presents with    Generalized Weakness     Legs aren't working and she is having trouble walking     HPI  Camelia Corea is a 24 year old female with a history of opioid use disorder who presents to the emergency department with bilateral foot pain/paresthesias or difficulty walking, generalized weakness, and urinary symptoms.  Patient has been having numbness, burning, and tingling on her feet which makes it hard to walk. Her mother notes that she has a B12 deficiency and was prescribed medication for this, but stopped taking the medication 5 days ago.  She also has been using nitrous oxide to get high up until 5 days ago.  He has a prior history of abuse of the substance, was admitted previously related to symptomatic anemia and severe B12 deficiency with pancytopenia presumably due to this.  She did take her B12 supplement today however.  Patient reports that she has a loss of control of her bowel and bladder and has pain upon urination.  Patient also endorses hematuria and odorous urine.  She has also had loose stools and is sexually active.      Past Medical History  Past Medical History:   Diagnosis Date    Vitamin B12 deficiency (non anemic)      History reviewed. No pertinent surgical history.  gabapentin (NEURONTIN) 100 MG capsule  melatonin 5 MG CAPS  pantoprazole (PROTONIX) 40 MG EC tablet  vitamin B-12 (CYANOCOBALAMIN) 100 MCG tablet      No Known Allergies  Family History  Family History   Problem Relation Age of Onset    Substance Abuse Sister     Substance Abuse Sister      Social History   Social History     Tobacco Use    Smoking status: Every Day     Types: Vaping Device    Smokeless tobacco: Never   Vaping Use    Vaping status: Some Days   Substance Use Topics    Alcohol use: No    Drug use: Yes     Types: Marijuana, Fentanyl     Comment: Mother states she used 5 days ago.      A medically appropriate  "review of systems was performed with pertinent positives and negatives noted in the HPI, and all other systems negative.    Physical Exam   BP: 105/65  Pulse: 95  Temp: 98.5  F (36.9  C)  Resp: 16  Height: 167.6 cm (5' 6\")  Weight: 82.9 kg (182 lb 12.8 oz)  SpO2: 100 %  Physical Exam  Vitals and nursing note reviewed. Exam conducted with a chaperone present.   Constitutional:       General: She is not in acute distress.     Appearance: Normal appearance. She is not diaphoretic.   HENT:      Head: Atraumatic.      Mouth/Throat:      Mouth: Mucous membranes are moist.   Eyes:      General: No scleral icterus.     Conjunctiva/sclera: Conjunctivae normal.   Cardiovascular:      Rate and Rhythm: Normal rate.      Heart sounds: Normal heart sounds.   Pulmonary:      Effort: No respiratory distress.      Breath sounds: Normal breath sounds.   Abdominal:      General: Abdomen is flat.   Genitourinary:     Exam position: Lithotomy position.      Vagina: Vaginal discharge present.      Cervix: Normal. No cervical motion tenderness.      Uterus: Normal.       Adnexa:         Right: No mass.          Left: No mass.        Rectum: Normal.      Comments: Cervix appears normal, no CMT  Rectal sphincter tone normal, Hemoccult not able to be performed, no stool in rectal vault  Musculoskeletal:      Cervical back: Neck supple.      Lumbar back: Tenderness and bony tenderness present.      Comments: She has both muscular and bony tenderness of the lumbar spine, no point vertebral tenderness   Skin:     General: Skin is warm.      Findings: No rash.   Neurological:      Mental Status: She is alert and oriented to person, place, and time.      Cranial Nerves: Cranial nerves 2-12 are intact.      Sensory: Sensation is intact.      Motor: Weakness present.      Deep Tendon Reflexes: Babinski sign absent on the right side. Babinski sign absent on the left side.      Reflex Scores:       Patellar reflexes are 1+ on the right side and 1+ on " the left side.       Achilles reflexes are 0 on the right side and 0 on the left side.     Comments: Subjective diminished sensation to lower extremities bilaterally, however patient does have sensation to light touch and pinprick both legs and feet.  Postvoid residual bladder 40 ml    Rectal sphincter tone normal, no saddle anesthesia   Psychiatric:         Attention and Perception: Attention normal.         Mood and Affect: Mood is anxious. Affect is labile.         Speech: Speech normal.         Behavior: Behavior is agitated.         Thought Content: Thought content normal.         Cognition and Memory: Cognition normal.         Judgment: Judgment is impulsive.           ED Course, Procedures, & Data      Procedures            IV Antibiotics given and/or elevated Lactate of 0 and no sepsis note found - Delete this reminder and enter the sepsis note or '.edcms' before signing chart.>>>     Results for orders placed or performed during the hospital encounter of 12/21/24   Lumbar spine MRI w/o contrast     Status: None    Narrative    EXAM: MR LUMBAR SPINE W/O CONTRAST  LOCATION: [facility]  DATE: 12/21/2024    INDICATION: Lower back pain, reporting bladder bowel incontinence, bilateral lower extremity paresthesias and weakness  COMPARISON: CT abdomen/pelvis dated 9/25/2024  TECHNIQUE: Routine Lumbar Spine MRI without IV contrast    FINDINGS:   Mildly motion degraded axial images.    Nomenclature is based on 5 lumbar type vertebral bodies with a likely congenitally smaller L5-S1 intervertebral disc. Normal curvature, alignment, vertebral body heights, as well as intervertebral disc signal and bone marrow intensity. No significant   disc height loss. No pars interarticularis defect. Within the limitation of motion artifact, grossly unremarkable cauda equina nerve roots and partially imaged conus medullaris, the latter terminating at L1. Congenital tapered narrowing of  the spinal   canal at L3-L5 with prominent epidural fat at L5-S3. No significant extraspinal finding.    T12-L1: No significant degenerative change, spinal canal stenosis, or foraminal narrowing.     L1-L2: Mild bilateral facet arthrosis. No significant disc degenerative change, spinal canal stenosis, or foraminal narrowing.    L2-L3: Mild bilateral facet arthrosis. No significant disc degenerative change, spinal canal stenosis, or foraminal narrowing.     L3-L4: Mild disc bulge and bilateral facet arthrosis without significant spinal canal or foraminal stenosis.    L4-L5: Disc bulge with a small superimposed central disc protrusion, hypertrophy of the ligamenta flava, and mild bilateral facet arthrosis contributing to mild spinal canal stenosis. No significant foraminal narrowing.    L5-S1: Mild bilateral facet arthrosis. No significant disc herniation, spinal canal stenosis, or foraminal narrowing.      Impression    IMPRESSION:  1.  Mild spondylosis with mild acquired superimposed on congenital spinal canal stenosis at L4-L5.  2.  No significant foraminal narrowing.   UA with Microscopic reflex to Culture     Status: Abnormal    Specimen: Urine, Clean Catch   Result Value Ref Range    Color Urine Yellow Colorless, Straw, Light Yellow, Yellow    Appearance Urine Cloudy (A) Clear    Glucose Urine 30 (A) Negative mg/dL    Bilirubin Urine Negative Negative    Ketones Urine Trace (A) Negative mg/dL    Specific Gravity Urine 1.027 1.003 - 1.035    Blood Urine Large (A) Negative    pH Urine 6.0 5.0 - 7.0    Protein Albumin Urine 200 (A) Negative mg/dL    Urobilinogen Urine 3.0 (A) Normal, 2.0 mg/dL    Nitrite Urine Positive (A) Negative    Leukocyte Esterase Urine Large (A) Negative    Bacteria Urine Few (A) None Seen /HPF    WBC Clumps Urine Present (A) None Seen /HPF    Mucus Urine Present (A) None Seen /LPF    RBC Urine >182 (H) <=2 /HPF    WBC Urine >182 (H) <=5 /HPF    Squamous Epithelials Urine 1 <=1 /HPF     Narrative    Urine Culture ordered based on laboratory criteria   Comprehensive metabolic panel     Status: Abnormal   Result Value Ref Range    Sodium 144 135 - 145 mmol/L    Potassium 4.8 3.4 - 5.3 mmol/L    Carbon Dioxide (CO2) 27 22 - 29 mmol/L    Anion Gap 7 7 - 15 mmol/L    Urea Nitrogen 21.5 (H) 6.0 - 20.0 mg/dL    Creatinine 0.71 0.51 - 0.95 mg/dL    GFR Estimate >90 >60 mL/min/1.73m2    Calcium 8.8 8.8 - 10.4 mg/dL    Chloride 110 (H) 98 - 107 mmol/L    Glucose 135 (H) 70 - 99 mg/dL    Alkaline Phosphatase 71 40 - 150 U/L    AST 36 0 - 45 U/L    ALT 38 0 - 50 U/L    Protein Total 6.8 6.4 - 8.3 g/dL    Albumin 3.7 3.5 - 5.2 g/dL    Bilirubin Total 0.3 <=1.2 mg/dL   TSH with free T4 reflex     Status: Normal   Result Value Ref Range    TSH 2.84 0.30 - 4.20 uIU/mL   Vitamin B12     Status: Abnormal   Result Value Ref Range    Vitamin B12 157 (L) 232 - 1,245 pg/mL   CRP inflammation     Status: Abnormal   Result Value Ref Range    CRP Inflammation 8.02 (H) <5.00 mg/L   Erythrocyte sedimentation rate auto     Status: Abnormal   Result Value Ref Range    Erythrocyte Sedimentation Rate 46 (H) 0 - 20 mm/hr   Ethyl Alcohol Level     Status: Normal   Result Value Ref Range    Alcohol ethyl <0.01 <=0.01 g/dL   CBC with platelets and differential     Status: Abnormal   Result Value Ref Range    WBC Count 6.7 4.0 - 11.0 10e3/uL    RBC Count 2.35 (L) 3.80 - 5.20 10e6/uL    Hemoglobin 6.3 (LL) 11.7 - 15.7 g/dL    Hematocrit 21.5 (L) 35.0 - 47.0 %    MCV 92 78 - 100 fL    MCH 26.8 26.5 - 33.0 pg    MCHC 29.3 (L) 31.5 - 36.5 g/dL    RDW 26.5 (H) 10.0 - 15.0 %    Platelet Count 98 (L) 150 - 450 10e3/uL   Manual Differential     Status: Abnormal   Result Value Ref Range    % Neutrophils 25 %    % Lymphocytes 52 %    % Monocytes 14 %    % Eosinophils 1 %    % Basophils 0 %    % Metamyelocytes 2 %    % Myelocytes 5 %    Absolute Neutrophils 1.7 1.6 - 8.3 10e3/uL    Absolute Lymphocytes 3.5 0.8 - 5.3 10e3/uL    Absolute  Monocytes 0.9 0.0 - 1.3 10e3/uL    Absolute Eosinophils 0.1 0.0 - 0.7 10e3/uL    Absolute Basophils 0.0 0.0 - 0.2 10e3/uL    Absolute Metamyelocytes 0.1 (H) <=0.0 10e3/uL    Absolute Myelocytes 0.3 (H) <=0.0 10e3/uL    RBC Morphology Confirmed RBC Indices     Platelet Assessment  Automated Count Confirmed. Platelet morphology is normal.     Automated Count Confirmed. Platelet morphology is normal.    Elliptocytes Slight (A) None Seen    RBC Fragments Slight (A) None Seen    Polychromasia Slight (A) None Seen    Teardrop Cells Slight (A) None Seen   Adult Type and Screen     Status: None   Result Value Ref Range    ABO/RH(D) O POS     Antibody Screen Negative Negative    SPECIMEN EXPIRATION DATE [id number]    Prepare red blood cells (unit)     Status: None (Preliminary result)   Result Value Ref Range    Blood Component Type Red Blood Cells     Product Code M4445D25     Unit Status Issued     Unit Number [id number]     CROSSMATCH Compatible     CODING SYSTEM [id number]     ISSUE DATE AND TIME [id number]     UNIT ABO/RH O+     UNIT TYPE ISBT 5100    Wet prep     Status: Abnormal    Specimen: Vagina; Swab   Result Value Ref Range    Trichomonas Absent Absent    Yeast Absent Absent    Clue Cells Present (A) Absent    WBCs/high power field 1+ (A) None   CBC with platelets differential     Status: Abnormal    Narrative    The following orders were created for panel order CBC with platelets differential.  Procedure                               Abnormality         Status                     ---------                               -----------         ------                     CBC with platelets and d...[[id number]]  Abnormal            Final result               Manual Differential[[id number]]          Abnormal            Final result               Manual Differential[[id number]]                                                           Please view results for these tests on the individual orders.   ABO/Rh type and  screen *Canceled*     Status: None ()    Narrative    The following orders were created for panel order ABO/Rh type and screen.  Procedure                               Abnormality         Status                     ---------                               -----------         ------                       Please view results for these tests on the individual orders.   ABO/Rh type and screen *Canceled*     Status: None ()    Narrative    The following orders were created for panel order ABO/Rh type and screen.  Procedure                               Abnormality         Status                     ---------                               -----------         ------                       Please view results for these tests on the individual orders.   ABO/Rh type and screen     Status: None    Narrative    The following orders were created for panel order ABO/Rh type and screen.  Procedure                               Abnormality         Status                     ---------                               -----------         ------                     Adult Type and Screen[[id number]]                            Edited Result - FINAL        Please view results for these tests on the individual orders.     Medications   LORazepam (ATIVAN) tablet 1 mg (1 mg Oral $Given 12/21/24 1507)   OLANZapine zydis (zyPREXA) ODT tab 5 mg (5 mg Oral $Given 12/21/24 1507)   cefTRIAXone (ROCEPHIN) 1 g vial to attach to  mL bag for ADULTS or NS 50 mL bag for PEDS (0 g Intravenous Stopped 12/21/24 1559)     Labs Ordered and Resulted from Time of ED Arrival to Time of ED Departure   ROUTINE UA WITH MICROSCOPIC REFLEX TO CULTURE - Abnormal       Result Value    Color Urine Yellow      Appearance Urine Cloudy (*)     Glucose Urine 30 (*)     Bilirubin Urine Negative      Ketones Urine Trace (*)     Specific Gravity Urine 1.027      Blood Urine Large (*)     pH Urine 6.0      Protein Albumin Urine 200 (*)     Urobilinogen Urine 3.0 (*)      Nitrite Urine Positive (*)     Leukocyte Esterase Urine Large (*)     Bacteria Urine Few (*)     WBC Clumps Urine Present (*)     Mucus Urine Present (*)     RBC Urine >182 (*)     WBC Urine >182 (*)     Squamous Epithelials Urine 1     COMPREHENSIVE METABOLIC PANEL - Abnormal    Sodium 144      Potassium 4.8      Carbon Dioxide (CO2) 27      Anion Gap 7      Urea Nitrogen 21.5 (*)     Creatinine 0.71      GFR Estimate >90      Calcium 8.8      Chloride 110 (*)     Glucose 135 (*)     Alkaline Phosphatase 71      AST 36      ALT 38      Protein Total 6.8      Albumin 3.7      Bilirubin Total 0.3     VITAMIN B12 - Abnormal    Vitamin B12 157 (*)    CRP INFLAMMATION - Abnormal    CRP Inflammation 8.02 (*)    ERYTHROCYTE SEDIMENTATION RATE AUTO - Abnormal    Erythrocyte Sedimentation Rate 46 (*)    CBC WITH PLATELETS AND DIFFERENTIAL - Abnormal    WBC Count 6.7      RBC Count 2.35 (*)     Hemoglobin 6.3 (*)     Hematocrit 21.5 (*)     MCV 92      MCH 26.8      MCHC 29.3 (*)     RDW 26.5 (*)     Platelet Count 98 (*)    DIFFERENTIAL - Abnormal    % Neutrophils 25      % Lymphocytes 52      % Monocytes 14      % Eosinophils 1      % Basophils 0      % Metamyelocytes 2      % Myelocytes 5      Absolute Neutrophils 1.7      Absolute Lymphocytes 3.5      Absolute Monocytes 0.9      Absolute Eosinophils 0.1      Absolute Basophils 0.0      Absolute Metamyelocytes 0.1 (*)     Absolute Myelocytes 0.3 (*)     RBC Morphology Confirmed RBC Indices      Platelet Assessment        Value: Automated Count Confirmed. Platelet morphology is normal.    Elliptocytes Slight (*)     RBC Fragments Slight (*)     Polychromasia Slight (*)     Teardrop Cells Slight (*)    WET PREPARATION - Abnormal    Trichomonas Absent      Yeast Absent      Clue Cells Present (*)     WBCs/high power field 1+ (*)    TSH WITH FREE T4 REFLEX - Normal    TSH 2.84     ETHYL ALCOHOL LEVEL - Normal    Alcohol ethyl <0.01     HCG QUALITATIVE URINE   TYPE AND SCREEN,  ADULT    ABO/RH(D) O POS      Antibody Screen Negative      SPECIMEN EXPIRATION DATE [id number]     PREPARE RED BLOOD CELLS (UNIT)    Blood Component Type Red Blood Cells      Product Code [id number]      Unit Status Issued      Unit Number [id number]      CROSSMATCH Compatible      CODING SYSTEM [id number]      ISSUE DATE AND TIME [id number]      UNIT ABO/RH O+      UNIT TYPE ISBT 5100     PREPARE RED BLOOD CELLS (UNIT)   CHLAMYDIA TRACHOMATIS/NEISSERIA GONORRHOEAE BY PCR   URINE CULTURE   TRANSFUSE RED BLOOD CELLS (UNIT)   ABO/RH TYPE AND SCREEN     Lumbar spine MRI w/o contrast   Final Result   IMPRESSION:   1.  Mild spondylosis with mild acquired superimposed on congenital spinal canal stenosis at L4-L5.   2.  No significant foraminal narrowing.                 Medical Decision Making  The patient's presentation was of high complexity (a chronic illness severe exacerbation, progression, or side effect of treatment).    The patient's evaluation involved:  an assessment requiring an independent historian (patient's mother provides collateral information)  review of external note(s) from 1 sources (review of documentation from patient's recent hospitalization in November 2024)  review of 3+ test result(s) ordered prior to this encounter (review of prior imaging, prior B12 levels been prior hemoglobin)  ordering and/or review of 3+ test(s) in this encounter (see separate area of note for details)  independent interpretation of testing performed by another health professional (MRI lumbar spine images personally reviewed reviewed radiologist interpretation)  discussion of management or test interpretation with another health professional (discussed with admitting hospitalist)    The patient's management necessitated moderate risk (prescription drug management including medications given in the ED), moderate risk (IV contrast administration), high risk (drug therapy requiring intensive monitoring (packed red  blood cell transfusion)), and high risk (a decision regarding hospitalization).     Assessment & Plan    A 24-year-old patient, very complicated, history of polysubstance abuse including inhaled NO2, opioid abuse, benzodiazepine abuse, resultant pancytopenia and B12 deficiency.  Prior hospitalization November 27, 2024 for upper GI bleed, pancytopenia and peripheral neuropathy, now presenting with multiple complaints which include severe generalized weakness, burning paresthesias and weakness of the lower extremities, urinary urgency and incontinence, dark-colored loose stools, poor compliance with medications and treatment recommendations, and vaginal discharge.  On exam the patient's vital signs are normal, she does not appear in acute distress.  She is tired appearing.  Her physical exam is significant for a benign pelvic and abdominal exam.  No significant urinary postvoid residual in the bladder, normal rectal sphincter tone.  Cannot perform Hemoccult as there was no stool in the vault.  She does have diminished lower extremity deep tendon reflexes however these are symmetric.  She has giveaway weakness of both lower extremities.  Generalized lumbar tenderness without point vertebral tenderness or signs of trauma  Her labs do reveal evidence of a UTI and significant acute on chronic normocytic anemia.  She also has thrombocytopenia with platelet count 98,000.  There is evidence of bacterial vaginosis.  She got angry and agitated and her mother (patient has a history of aggressive behavior related to prior psychiatric diagnosis) and through a [place] stand that her brother.  Briefly a code 21 was called, she was given oral meds for agitation and was able to calm herself.  An MRI of the lumbar spine was ordered due to report of lower extremity weakness and paresthesias, diminished deep tendon reflexes, and reported episodes of bladder incontinence.  Physical exam does not support cauda equina syndrome, and previously  her lower extremity paresthesias have been attributed to peripheral neuropathy related to B12 deficiency.  MRI is without any acute findings to explain lower extremity paresthesias weakness and bowel or bladder symptoms.  Regarding her anemia, she is symptomatic with hemoglobin 2 g below normal.  Has previously required transfusion.  I did order a unit of packed red blood cells.  Patient consents.  Discussed with the hospitalist, will plan to admit presumably to restart B12 replacement, discussed with hematology, possible GI consult.  Appears stable for the floor at this time and agreeable to admission.  I have discussed with the inpatient service.      I have reviewed the nursing notes. I have reviewed the findings, diagnosis, plan and need for follow up with the patient.    New Prescriptions    No medications on file       Final diagnoses:   Acute on chronic anemia   Vitamin B 12 deficiency   Urinary tract infection without hematuria, site unspecified   I, Leigh Yañez, am serving as a trained medical scribe to document services personally performed by Pete Decker MD, based on the provider's statements to me.     IPete MD, was physically present and have reviewed and verified the accuracy of this note documented by Leigh Yañez.     Pete Decker MD  [facility] EMERGENCY DEPARTMENT  12/21/2024     Pete Decker MD  12/22/24 2314

## 2024-12-22 ENCOUNTER — APPOINTMENT (OUTPATIENT)
Dept: CT IMAGING | Facility: CLINIC | Age: 24
DRG: 690 | End: 2024-12-22

## 2024-12-22 ENCOUNTER — APPOINTMENT (OUTPATIENT)
Dept: OCCUPATIONAL THERAPY | Facility: CLINIC | Age: 24
DRG: 690 | End: 2024-12-22

## 2024-12-22 LAB
ALBUMIN SERPL BCG-MCNC: 3.9 G/DL (ref 3.5–5.2)
ALP SERPL-CCNC: 69 U/L (ref 40–150)
ALT SERPL W P-5'-P-CCNC: 41 U/L (ref 0–50)
ANION GAP SERPL CALCULATED.3IONS-SCNC: 11 MMOL/L (ref 7–15)
AST SERPL W P-5'-P-CCNC: 31 U/L (ref 0–45)
ATRIAL RATE - MUSE: 83 BPM
BASO STIPL BLD QL SMEAR: PRESENT
BASOPHILS # BLD MANUAL: 0 10E3/UL (ref 0–0.2)
BASOPHILS # BLD MANUAL: 0 10E3/UL (ref 0–0.2)
BASOPHILS NFR BLD MANUAL: 0 %
BASOPHILS NFR BLD MANUAL: 0 %
BILIRUB SERPL-MCNC: 0.3 MG/DL
BUN SERPL-MCNC: 30.3 MG/DL (ref 6–20)
C TRACH DNA SPEC QL PROBE+SIG AMP: NEGATIVE
CALCIUM SERPL-MCNC: 8.9 MG/DL (ref 8.8–10.4)
CHLORIDE SERPL-SCNC: 108 MMOL/L (ref 98–107)
CREAT SERPL-MCNC: 0.8 MG/DL (ref 0.51–0.95)
DACRYOCYTES BLD QL SMEAR: SLIGHT
DAT POLY: NEGATIVE
DIASTOLIC BLOOD PRESSURE - MUSE: NORMAL MMHG
EGFRCR SERPLBLD CKD-EPI 2021: >90 ML/MIN/1.73M2
EOSINOPHIL # BLD MANUAL: 0.1 10E3/UL (ref 0–0.7)
EOSINOPHIL # BLD MANUAL: 0.1 10E3/UL (ref 0–0.7)
EOSINOPHIL NFR BLD MANUAL: 2 %
EOSINOPHIL NFR BLD MANUAL: 2 %
ERYTHROCYTE [DISTWIDTH] IN BLOOD BY AUTOMATED COUNT: 23.6 % (ref 10–15)
ERYTHROCYTE [DISTWIDTH] IN BLOOD BY AUTOMATED COUNT: 23.6 % (ref 10–15)
GLUCOSE SERPL-MCNC: 128 MG/DL (ref 70–99)
HCO3 SERPL-SCNC: 24 MMOL/L (ref 22–29)
HCT VFR BLD AUTO: 26.3 % (ref 35–47)
HCT VFR BLD AUTO: 27 % (ref 35–47)
HGB BLD-MCNC: 7.9 G/DL (ref 11.7–15.7)
HGB BLD-MCNC: 8.1 G/DL (ref 11.7–15.7)
HGB BLD-MCNC: 8.4 G/DL (ref 11.7–15.7)
HGB BLD-MCNC: 9.1 G/DL (ref 11.7–15.7)
INTERPRETATION ECG - MUSE: NORMAL
LYMPHOCYTES # BLD MANUAL: 2.3 10E3/UL (ref 0.8–5.3)
LYMPHOCYTES # BLD MANUAL: 2.6 10E3/UL (ref 0.8–5.3)
LYMPHOCYTES NFR BLD MANUAL: 40 %
LYMPHOCYTES NFR BLD MANUAL: 40 %
MCH RBC QN AUTO: 27.4 PG (ref 26.5–33)
MCH RBC QN AUTO: 27.7 PG (ref 26.5–33)
MCHC RBC AUTO-ENTMCNC: 30 G/DL (ref 31.5–36.5)
MCHC RBC AUTO-ENTMCNC: 30 G/DL (ref 31.5–36.5)
MCV RBC AUTO: 91 FL (ref 78–100)
MCV RBC AUTO: 92 FL (ref 78–100)
METAMYELOCYTES # BLD MANUAL: 0.2 10E3/UL
METAMYELOCYTES # BLD MANUAL: 0.3 10E3/UL
METAMYELOCYTES NFR BLD MANUAL: 3 %
METAMYELOCYTES NFR BLD MANUAL: 5 %
MONOCYTES # BLD MANUAL: 0.7 10E3/UL (ref 0–1.3)
MONOCYTES # BLD MANUAL: 0.7 10E3/UL (ref 0–1.3)
MONOCYTES NFR BLD MANUAL: 11 %
MONOCYTES NFR BLD MANUAL: 12 %
MYELOCYTES # BLD MANUAL: 0.1 10E3/UL
MYELOCYTES # BLD MANUAL: 0.5 10E3/UL
MYELOCYTES NFR BLD MANUAL: 2 %
MYELOCYTES NFR BLD MANUAL: 8 %
N GONORRHOEA DNA SPEC QL NAA+PROBE: NEGATIVE
NEUTROPHILS # BLD MANUAL: 2.2 10E3/UL (ref 1.6–8.3)
NEUTROPHILS # BLD MANUAL: 2.3 10E3/UL (ref 1.6–8.3)
NEUTROPHILS NFR BLD MANUAL: 35 %
NEUTROPHILS NFR BLD MANUAL: 41 %
NRBC # BLD AUTO: 0.6 10E3/UL
NRBC BLD MANUAL-RTO: 9 %
P AXIS - MUSE: 46 DEGREES
PLAT MORPH BLD: ABNORMAL
PLAT MORPH BLD: ABNORMAL
PLATELET # BLD AUTO: 119 10E3/UL (ref 150–450)
PLATELET # BLD AUTO: 98 10E3/UL (ref 150–450)
POLYCHROMASIA BLD QL SMEAR: SLIGHT
POLYCHROMASIA BLD QL SMEAR: SLIGHT
POTASSIUM SERPL-SCNC: 4.5 MMOL/L (ref 3.4–5.3)
PR INTERVAL - MUSE: 158 MS
PROT SERPL-MCNC: 7 G/DL (ref 6.4–8.3)
QRS DURATION - MUSE: 68 MS
QT - MUSE: 344 MS
QTC - MUSE: 404 MS
R AXIS - MUSE: 66 DEGREES
RBC # BLD AUTO: 2.85 10E6/UL (ref 3.8–5.2)
RBC # BLD AUTO: 2.96 10E6/UL (ref 3.8–5.2)
RBC MORPH BLD: ABNORMAL
RBC MORPH BLD: ABNORMAL
RETICS # AUTO: 0.41 10E6/UL (ref 0.03–0.1)
RETICS/RBC NFR AUTO: 14.9 % (ref 0.5–2)
SODIUM SERPL-SCNC: 143 MMOL/L (ref 135–145)
SPECIMEN EXP DATE BLD: NORMAL
SYSTOLIC BLOOD PRESSURE - MUSE: NORMAL MMHG
T AXIS - MUSE: 54 DEGREES
VENTRICULAR RATE- MUSE: 83 BPM
WBC # BLD AUTO: 5.7 10E3/UL (ref 4–11)
WBC # BLD AUTO: 6.5 10E3/UL (ref 4–11)

## 2024-12-22 PROCEDURE — 86880 COOMBS TEST DIRECT: CPT | Performed by: INTERNAL MEDICINE

## 2024-12-22 PROCEDURE — 97166 OT EVAL MOD COMPLEX 45 MIN: CPT | Mod: GO

## 2024-12-22 PROCEDURE — 85007 BL SMEAR W/DIFF WBC COUNT: CPT

## 2024-12-22 PROCEDURE — 97530 THERAPEUTIC ACTIVITIES: CPT | Mod: GO

## 2024-12-22 PROCEDURE — 85045 AUTOMATED RETICULOCYTE COUNT: CPT | Performed by: INTERNAL MEDICINE

## 2024-12-22 PROCEDURE — 250N000009 HC RX 250

## 2024-12-22 PROCEDURE — 99232 SBSQ HOSP IP/OBS MODERATE 35: CPT | Performed by: INTERNAL MEDICINE

## 2024-12-22 PROCEDURE — 80048 BASIC METABOLIC PNL TOTAL CA: CPT

## 2024-12-22 PROCEDURE — 120N000002 HC R&B MED SURG/OB UMMC

## 2024-12-22 PROCEDURE — 82374 ASSAY BLOOD CARBON DIOXIDE: CPT

## 2024-12-22 PROCEDURE — 85007 BL SMEAR W/DIFF WBC COUNT: CPT | Performed by: INTERNAL MEDICINE

## 2024-12-22 PROCEDURE — 36415 COLL VENOUS BLD VENIPUNCTURE: CPT

## 2024-12-22 PROCEDURE — 74174 CTA ABD&PLVS W/CONTRAST: CPT | Mod: 26 | Performed by: RADIOLOGY

## 2024-12-22 PROCEDURE — 85014 HEMATOCRIT: CPT | Performed by: INTERNAL MEDICINE

## 2024-12-22 PROCEDURE — 250N000011 HC RX IP 250 OP 636

## 2024-12-22 PROCEDURE — 85060 BLOOD SMEAR INTERPRETATION: CPT | Performed by: STUDENT IN AN ORGANIZED HEALTH CARE EDUCATION/TRAINING PROGRAM

## 2024-12-22 PROCEDURE — 250N000011 HC RX IP 250 OP 636: Performed by: INTERNAL MEDICINE

## 2024-12-22 PROCEDURE — 83010 ASSAY OF HAPTOGLOBIN QUANT: CPT | Performed by: INTERNAL MEDICINE

## 2024-12-22 PROCEDURE — 250N000013 HC RX MED GY IP 250 OP 250 PS 637

## 2024-12-22 PROCEDURE — 85027 COMPLETE CBC AUTOMATED: CPT

## 2024-12-22 PROCEDURE — 97535 SELF CARE MNGMENT TRAINING: CPT | Mod: GO

## 2024-12-22 PROCEDURE — 85018 HEMOGLOBIN: CPT | Performed by: INTERNAL MEDICINE

## 2024-12-22 PROCEDURE — 74174 CTA ABD&PLVS W/CONTRAST: CPT

## 2024-12-22 PROCEDURE — 999N000147 HC STATISTIC PT IP EVAL DEFER

## 2024-12-22 PROCEDURE — 36415 COLL VENOUS BLD VENIPUNCTURE: CPT | Performed by: INTERNAL MEDICINE

## 2024-12-22 RX ORDER — FOLIC ACID 5 MG/ML
1 INJECTION, SOLUTION INTRAMUSCULAR; INTRAVENOUS; SUBCUTANEOUS DAILY
Status: COMPLETED | OUTPATIENT
Start: 2024-12-22 | End: 2024-12-24

## 2024-12-22 RX ORDER — IOPAMIDOL 755 MG/ML
100 INJECTION, SOLUTION INTRAVASCULAR ONCE
Status: COMPLETED | OUTPATIENT
Start: 2024-12-22 | End: 2024-12-22

## 2024-12-22 RX ADMIN — FOLIC ACID 1 MG: 5 INJECTION, SOLUTION INTRAMUSCULAR; INTRAVENOUS; SUBCUTANEOUS at 15:49

## 2024-12-22 RX ADMIN — METRONIDAZOLE 500 MG: 500 TABLET ORAL at 08:19

## 2024-12-22 RX ADMIN — GABAPENTIN 100 MG: 100 CAPSULE ORAL at 08:19

## 2024-12-22 RX ADMIN — GABAPENTIN 100 MG: 100 CAPSULE ORAL at 13:22

## 2024-12-22 RX ADMIN — METRONIDAZOLE 500 MG: 500 TABLET ORAL at 19:40

## 2024-12-22 RX ADMIN — SODIUM CHLORIDE 74 ML: 9 INJECTION, SOLUTION INTRAVENOUS at 02:04

## 2024-12-22 RX ADMIN — GABAPENTIN 100 MG: 100 CAPSULE ORAL at 19:40

## 2024-12-22 RX ADMIN — PANTOPRAZOLE SODIUM 40 MG: 40 INJECTION, POWDER, FOR SOLUTION INTRAVENOUS at 17:51

## 2024-12-22 RX ADMIN — IOPAMIDOL 112 ML: 755 INJECTION, SOLUTION INTRAVENOUS at 02:05

## 2024-12-22 RX ADMIN — CEFTRIAXONE SODIUM 1 G: 1 INJECTION, POWDER, FOR SOLUTION INTRAMUSCULAR; INTRAVENOUS at 16:10

## 2024-12-22 RX ADMIN — CYANOCOBALAMIN 1000 MCG: 1000 INJECTION, SOLUTION INTRAMUSCULAR; SUBCUTANEOUS at 00:52

## 2024-12-22 RX ADMIN — CYANOCOBALAMIN 1000 MCG: 1000 INJECTION, SOLUTION INTRAMUSCULAR; SUBCUTANEOUS at 08:21

## 2024-12-22 ASSESSMENT — ACTIVITIES OF DAILY LIVING (ADL)
ADLS_ACUITY_SCORE: 62
ADLS_ACUITY_SCORE: 64
ADLS_ACUITY_SCORE: 56
ADLS_ACUITY_SCORE: 62
ADLS_ACUITY_SCORE: 64
ADLS_ACUITY_SCORE: 60
ADLS_ACUITY_SCORE: 62
ADLS_ACUITY_SCORE: 64
ADLS_ACUITY_SCORE: 60
ADLS_ACUITY_SCORE: 62
ADLS_ACUITY_SCORE: 56
ADLS_ACUITY_SCORE: 62
ADLS_ACUITY_SCORE: 64
ADLS_ACUITY_SCORE: 64
ADLS_ACUITY_SCORE: 62
ADLS_ACUITY_SCORE: 64
ADLS_ACUITY_SCORE: 60
ADLS_ACUITY_SCORE: 64
ADLS_ACUITY_SCORE: 62
ADLS_ACUITY_SCORE: 56
ADLS_ACUITY_SCORE: 56

## 2024-12-22 NOTE — PROGRESS NOTES
States becomes lightheaded with walking; started one month ago. Numbness and tingling bilateral feet started one month ago.    Hemoglobin: 7.9.      Paged Medicine MD.

## 2024-12-22 NOTE — PROGRESS NOTES
Pt having urinary incontinence; unable to control passage of urine. patient also states bright red blood is in urine. Medicine MD paged.
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
## 2024-12-22 NOTE — PLAN OF CARE
VS: BP's soft, provider notified. LR bolus ordered, BP increased    O2: O2 WDL on RA. Denied SOB/CP   Output: Urinary urgency, wearing brief   Last BM: 12/21   Activity: SBA   Skin: intact   Pain: Pt reports abdominal pain and leg pain    CMS: Paresthesias to BLE   Dressing: none   Diet: regular   LDA: R PIV   Equipment: IV, personal belongings   Plan: Continue POC     513 Admission Note    Reason for admission: substance use disorder  Primary team notified of pt arrival.  Admitted from: [facility] ED  Via: Cart  Accompanied by: ED RN & NST  Belongings: Placed in room with pt   Admission Required Doc Completed: Yes/No  Teaching: Orientation to unit and call light- call light within reach, use of console, meal times, when to call for the RN, and enforced importance of safety.  IV Access: R AC PIV  Telemetry: No  Ht./Wt.: Completed  Code Status verified on armband: Yes  2 RN Skin Assessment Completed with: Jimena JAVIER RN  Suction/Ambu bag/Flowmeter at bedside: Yes    Pt status:     Temp:  [98  F (36.7  C)-98.5  F (36.9  C)] 98  F (36.7  C)  Pulse:  [] 85  Resp:  [16-18] 16  BP: ()/(34-76) 107/55  SpO2:  [99 %-100 %] 100 % RA    Goal Outcome Evaluation:      Plan of Care Reviewed With: patient    Overall Patient Progress: no changeOverall Patient Progress: no change    Outcome Evaluation: Pt arrived from  ED accompainied by RN and NST

## 2024-12-22 NOTE — PROGRESS NOTES
12/22/24 0856   Appointment Info   Signing Clinician's Name / Credentials (OT) Amandamarjorie Blake, OTR/L   Living Environment   People in Home other relative(s)  (2 cousins)   Current Living Arrangements other (see comments)  ([facility])   Home Accessibility stairs to enter home;stairs within home   Number of Stairs, Main Entrance 5   Stair Railings, Main Entrance railings safe and in good condition   Number of Stairs, Within Home, Primary greater than 10 stairs   Stair Railings, Within Home, Primary railings safe and in good condition   Living Environment Comments Bedroom and BR with WIS on upper level   Self-Care   Usual Activity Tolerance moderate   Current Activity Tolerance fair   Equipment Currently Used at Home none   Fall history within last six months yes   Number of times patient has fallen within last six months 1   Activity/Exercise/Self-Care Comment mom assists with ADLs for the past week otherwise ind with ADLs at baseline   General Information   Onset of Illness/Injury or Date of Surgery 12/21/24   Referring Physician Dr. Dykes   Patient/Family Therapy Goal Statement (OT) feet to feel better   Additional Occupational Profile Info/Pertinent History of Current Problem per chart,  24 year old female admitted on 12/21/2024. She has a notable past medical history of polysubstance use disorder (opioids including fentanyl (previously on suboxone), marijuana, benzodiazepines, alcohol, tobacco), opioid overdose, anxiety, mood disorder. She presented to the ED with bilateral foot paraesthesias, generalized weakness, and urinary symptoms. She was admitted to internal medicine for treatment of UTI and further assessment. Of note, recent admission November 27, 2024 for pancytopenia thought to be related to b12 deficiency which resolved - to further rule out bleeding, recommendation was for outpatient colonoscopy/endoscopy.   Existing Precautions/Restrictions fall   Left Upper Extremity (Weight-bearing Status) full  weight-bearing (FWB)   Right Upper Extremity (Weight-bearing Status) full weight-bearing (FWB)   Left Lower Extremity (Weight-bearing Status) full weight-bearing (FWB)   Right Lower Extremity (Weight-bearing Status) full weight-bearing (FWB)   Cognitive Status Examination   Orientation Status orientation to person, place and time   Affect/Mental Status (Cognitive) WFL   Follows Commands WFL   Cognitive Status Comments pt repeating same questions t/o session   Visual Perception   Visual Impairment/Limitations corrective lenses for distance   Sensory   Sensory Quick Adds bilateral LE   Sensory Comments numbness in B feet and hands for past month   Pain Assessment   Patient Currently in Pain No   Posture   Posture not impaired   Range of Motion Comprehensive   General Range of Motion no range of motion deficits identified   Strength Comprehensive (MMT)   General Manual Muscle Testing (MMT) Assessment no strength deficits identified   Muscle Tone Assessment   Muscle Tone Quick Adds No deficits were identified   Coordination   Upper Extremity Coordination No deficits were identified   Bed Mobility   Bed Mobility supine-sit;sit-supine   Supine-Sit [place] (Bed Mobility) supervision   Sit-Supine [place] (Bed Mobility) supervision   Transfers   Transfers bed-chair transfer;sit-stand transfer;toilet transfer   Transfer Skill: Bed to Chair/Chair to Bed   Bed-Chair [place] (Transfers) contact guard   Sit-Stand Transfer   Sit-Stand [place] (Transfers) contact guard   Toilet Transfer   [place] Level (Toilet Transfer) contact guard   Balance   Balance Assessment standing static balance;standing dynamic balance   Activities of Daily Living   BADL Assessment/Intervention lower body dressing;upper body dressing;toileting   Upper Body Dressing Assessment/Training   [place] Level (Upper Body Dressing) supervision   Lower Body Dressing Assessment/Training   [place] Level (Lower Body Dressing) contact  guard assist   Toileting   [place] Level (Toileting) contact guard assist   Clinical Impression   Criteria for Skilled Therapeutic Interventions Met (OT) Yes, treatment indicated   OT Diagnosis decreased ADL ind   Influenced by the following impairments UTI, anemia, vit B12 def   OT Problem List-Impairments impacting ADL problems related to;activity tolerance impaired;balance   Assessment of Occupational Performance 3-5 Performance Deficits   Identified Performance Deficits dressing, toileting, IADLs   Planned Therapy Interventions (OT) ADL retraining   Clinical Decision Making Complexity (OT) detailed assessment/moderate complexity   Risk & Benefits of therapy have been explained evaluation/treatment results reviewed;patient   OT Total Evaluation Time   OT Eval, Moderate Complexity Minutes (81698) 10   OT Goals   Therapy Frequency (OT) 5 times/week   OT Predicted Duration/Target Date for Goal Attainment 12/29/24   OT Goals Lower Body Dressing;Hygiene/Grooming;Bed Mobility;Transfers;Toilet Transfer/Toileting;OT Goal 1   OT: Hygiene/Grooming modified independent;while standing   OT: Lower Body Dressing Modified independent   OT: Bed Mobility Modified independent   OT: Transfer Modified independent   OT: Toilet Transfer/Toileting Modified independent   OT: Goal 1 Pt will complete 10 stairs with railing and SBA   Interventions   Interventions Quick Adds Self-Care/Home Management;Therapeutic Activity   Self-Care/Home Management   Self-Care/Home Mgmt/ADL, Compensatory, Meal Prep Minutes (79520) 8   Symptoms Noted During/After Treatment (Meal Preparation/Planning Training) none   Treatment Detail/Skilled Intervention Pt supine in bed upon OT arrival and agreeable to therapy. Pt CGA for UB dressing to doff/don gown while sitting on toilet. Pt Min A LB dressing to doff/don brief while sitting on toilet and standing w/o AD with LOBx2 and A to correct balance. Educ on safe transfers and hand placement to support balance.  Pt CGA toileting including clothing management with LOBx1 when pulling up brief and A from OT to correct balance. Pt left supine in bed with needs in reach and NST present.   Therapeutic Activities   Therapeutic Activity Minutes (06478) 15   Symptoms noted during/after treatment none   Treatment Detail/Skilled Intervention Focus on progressing functional mob to promote safety, ind and act tolerance with ADLs. Pt SBA supine <> sit bed mob with elevated HOB. Pt CGA STS, bed and toilet transfers with FWW. Pt El ambulation w/o AD bed>BR with LOBx2, pt able to correct by grabbing doorframe with min A correction from OT. Pt LOB during STS from toilet with Min A correction from OT. Pt Educ on trial for FWW for balance, pt agreeable. Pt CGA ambulated ~300' in [place] with FWW. Lob x2 when turning with FWW, Min A from OT to correct balance. vc for FWW safety.   OT Discharge Planning   OT Plan ambulation with FWW and progress to no AD as able, stairs, ADLs, monitor cog   OT Discharge Recommendation (DC Rec) home with assist   OT Rationale for DC Rec Pt lives with her 2 cousins and has been having her mother assist with ADLs for the past week. Pending LOS, anticipate that pt will progress to home with assist. Pending progress while IP, pt may benefit from OP PT and use of a FWW temporarily at home.   OT Brief overview of current status Ax1 for LOB   OT Equipment Needed at Discharge walker, rolling  (FWW)   Total Session Time   Timed Code Treatment Minutes 23   Total Session Time (sum of timed and untimed services) 33

## 2024-12-22 NOTE — PLAN OF CARE
"Goal Outcome Evaluation:           Overall Patient Progress: no changeOverall Patient Progress: no change         VS: /56 (BP Location: Right arm)   Pulse 84   Temp 98.1  F (36.7  C) (Oral)   Resp 16   Ht 1.676 m (5' 6\")   Wt 82.9 kg (182 lb 12.8 oz)   LMP 10/18/2024 (Within Days)   SpO2 100%   Breastfeeding No   BMI 29.50 kg/m     Denied chest pain and SOB. States becomes lightheaded with walking; Medicine MD aware.   O2: O2 WDL on RA.    Output: Urinary urgency, wearing brief. Incontinent urine; Medicine MD aware. Bright blood in urine/menstruation   Last BM: 12/21 incontinent of stool 1x   Activity: Ax1 with walker and gait belt. Very unsteady with walking   Skin: intact   Pain: Pt denies pain    CMS: Paresthesias to BLE; numbness and tingling started one month ago; Medicine MD aware  A&Ox4   Dressing: none   Diet: regular   LDA: R PIV SL    Equipment: IV, personal belongings   Plan: Continue POC    Hemoglobin 7.9. 1400 Repeat Hemoglobin lab: 9.1     Pt refused continuous pulse ox.             "

## 2024-12-22 NOTE — PLAN OF CARE
Goal Outcome Evaluation:      Plan of Care Reviewed With: patient    Overall Patient Progress: improvingOverall Patient Progress: improving       VS: VSS   O2: O2 WDL on RA. Denied SOB/CP   Output: Urinary urgency, wearing brief   Last BM: 12/21   Activity: SBA   Skin: intact   Pain: Pt reports abdominal pain and leg pain. Reposition, declined tylenol    CMS: Paresthesias to BLE   Dressing: none   Diet: regular   LDA: R PIV SL    Equipment: IV, personal belongings   Plan: Continue POC          Infectious Disease

## 2024-12-22 NOTE — PLAN OF CARE
Physical Therapy: Orders received. Chart reviewed and discussed with care team.? Physical Therapy not indicated due to per OT pt w/ unsteadiness w/out assistive device but OT initiated use of FWW and pt then able to progress amb to 300ft+ w/ CGA. Pt has support of family at home and can continue to work on FWW progression w/ OT. Appropriate to be followed by single discipline while IP.? Defer discharge recommendations to OT and medical team.? Will complete orders.

## 2024-12-23 LAB
BACTERIA UR CULT: ABNORMAL
BACTERIA UR CULT: ABNORMAL
ERYTHROCYTE [DISTWIDTH] IN BLOOD BY AUTOMATED COUNT: 23.4 % (ref 10–15)
HAPTOGLOB SERPL-MCNC: <10 MG/DL (ref 30–200)
HCT VFR BLD AUTO: 27.5 % (ref 35–47)
HGB BLD-MCNC: 8.1 G/DL (ref 11.7–15.7)
HGB BLD-MCNC: 8.1 G/DL (ref 11.7–15.7)
HGB BLD-MCNC: 8.2 G/DL (ref 11.7–15.7)
HGB BLD-MCNC: 8.3 G/DL (ref 11.7–15.7)
MCH RBC QN AUTO: 26.9 PG (ref 26.5–33)
MCHC RBC AUTO-ENTMCNC: 29.5 G/DL (ref 31.5–36.5)
MCV RBC AUTO: 91 FL (ref 78–100)
PATH REPORT.COMMENTS IMP SPEC: NORMAL
PATH REPORT.COMMENTS IMP SPEC: NORMAL
PATH REPORT.FINAL DX SPEC: NORMAL
PATH REPORT.MICROSCOPIC SPEC OTHER STN: NORMAL
PATH REPORT.MICROSCOPIC SPEC OTHER STN: NORMAL
PATH REPORT.RELEVANT HX SPEC: NORMAL
PLATELET # BLD AUTO: 152 10E3/UL (ref 150–450)
RBC # BLD AUTO: 3.01 10E6/UL (ref 3.8–5.2)
RPR SER QL: NONREACTIVE
WBC # BLD AUTO: 4.8 10E3/UL (ref 4–11)

## 2024-12-23 PROCEDURE — 85027 COMPLETE CBC AUTOMATED: CPT | Performed by: INTERNAL MEDICINE

## 2024-12-23 PROCEDURE — 250N000009 HC RX 250

## 2024-12-23 PROCEDURE — 99233 SBSQ HOSP IP/OBS HIGH 50: CPT | Performed by: STUDENT IN AN ORGANIZED HEALTH CARE EDUCATION/TRAINING PROGRAM

## 2024-12-23 PROCEDURE — 250N000011 HC RX IP 250 OP 636: Performed by: INTERNAL MEDICINE

## 2024-12-23 PROCEDURE — 250N000013 HC RX MED GY IP 250 OP 250 PS 637

## 2024-12-23 PROCEDURE — 85018 HEMOGLOBIN: CPT | Performed by: INTERNAL MEDICINE

## 2024-12-23 PROCEDURE — 120N000002 HC R&B MED SURG/OB UMMC

## 2024-12-23 PROCEDURE — 36415 COLL VENOUS BLD VENIPUNCTURE: CPT | Performed by: INTERNAL MEDICINE

## 2024-12-23 PROCEDURE — 97535 SELF CARE MNGMENT TRAINING: CPT | Mod: GO

## 2024-12-23 PROCEDURE — 250N000011 HC RX IP 250 OP 636

## 2024-12-23 RX ADMIN — GABAPENTIN 100 MG: 100 CAPSULE ORAL at 08:41

## 2024-12-23 RX ADMIN — METRONIDAZOLE 500 MG: 500 TABLET ORAL at 08:41

## 2024-12-23 RX ADMIN — CEFTRIAXONE SODIUM 1 G: 1 INJECTION, POWDER, FOR SOLUTION INTRAMUSCULAR; INTRAVENOUS at 16:02

## 2024-12-23 RX ADMIN — FOLIC ACID 1 MG: 5 INJECTION, SOLUTION INTRAMUSCULAR; INTRAVENOUS; SUBCUTANEOUS at 08:41

## 2024-12-23 RX ADMIN — HYDROXYZINE HYDROCHLORIDE 25 MG: 25 TABLET, FILM COATED ORAL at 11:28

## 2024-12-23 RX ADMIN — GABAPENTIN 100 MG: 100 CAPSULE ORAL at 13:35

## 2024-12-23 RX ADMIN — CYANOCOBALAMIN 1000 MCG: 1000 INJECTION, SOLUTION INTRAMUSCULAR; SUBCUTANEOUS at 08:42

## 2024-12-23 RX ADMIN — PANTOPRAZOLE SODIUM 40 MG: 40 INJECTION, POWDER, FOR SOLUTION INTRAVENOUS at 18:14

## 2024-12-23 RX ADMIN — METRONIDAZOLE 500 MG: 500 TABLET ORAL at 19:17

## 2024-12-23 RX ADMIN — GABAPENTIN 100 MG: 100 CAPSULE ORAL at 19:17

## 2024-12-23 ASSESSMENT — ACTIVITIES OF DAILY LIVING (ADL)
ADLS_ACUITY_SCORE: 60
ADLS_ACUITY_SCORE: 60
ADLS_ACUITY_SCORE: 71
ADLS_ACUITY_SCORE: 73
ADLS_ACUITY_SCORE: 73
ADLS_ACUITY_SCORE: 64
ADLS_ACUITY_SCORE: 64
ADLS_ACUITY_SCORE: 73
ADLS_ACUITY_SCORE: 60
ADLS_ACUITY_SCORE: 73
ADLS_ACUITY_SCORE: 60
ADLS_ACUITY_SCORE: 73
ADLS_ACUITY_SCORE: 60
ADLS_ACUITY_SCORE: 73
ADLS_ACUITY_SCORE: 64
ADLS_ACUITY_SCORE: 64
ADLS_ACUITY_SCORE: 60
ADLS_ACUITY_SCORE: 71
ADLS_ACUITY_SCORE: 60
ADLS_ACUITY_SCORE: 73
ADLS_ACUITY_SCORE: 60

## 2024-12-23 NOTE — PLAN OF CARE
VS: VSS   O2: Refuses cont. Pulse ox, O2 WDL on RA. Denies SOB/CP   Output: Incontinent of B/B. Wearing brief   Last BM: 12/22   Activity: AO1 GB/walker   Skin: intact   Pain: Abdominal pain and BLE pain   CMS: BLE neuropathy   Dressing: none   Diet: Regular   LDA: R PIV SL   Equipment: IV pole, personal belongings, food from home   Plan: Continue POC       Plan of Care Reviewed With: patient    Overall Patient Progress: no changeOverall Patient Progress: no change    Outcome Evaluation: no acute change to pt condition this shift

## 2024-12-23 NOTE — PLAN OF CARE
Problem: Adult Inpatient Plan of Care  Goal: Plan of Care Review  Description: The Plan of Care Review/Shift note should be completed every shift.  The Outcome Evaluation is a brief statement about your assessment that the patient is improving, declining, or no change.  This information will be displayed automatically on your shift  note.  Flowsheets (Taken 12/23/2024 1112)  Outcome Evaluation: CMA completed. No CM discharge needs identified, SW/CM will sign off. Pt anticipated discharge home.  Plan of Care Reviewed With: patient  Goal: Readiness for Transition of Care  Recent Flowsheet Documentation  Taken 12/23/2024 1100 by Rosanna Hunt MSW  Transportation Anticipated: (TBD) other (see comments)  Concerns to be Addressed: no discharge needs identified  Intervention: Mutually Develop Transition Plan  Recent Flowsheet Documentation  Taken 12/23/2024 1100 by Rosanna Hunt MSW  Readmission Within the Last 30 Days: no previous admission in last 30 days  Transportation Anticipated: (TBD) other (see comments)  Transportation Concerns: none  Concerns to be Addressed: no discharge needs identified  Patient/Family Anticipated Services at Transition: none  Patient/Family Anticipates Transition to: home  Equipment Currently Used at Home: none
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
## 2024-12-23 NOTE — PLAN OF CARE
Goal Outcome Evaluation:           Overall Patient Progress: no changeOverall Patient Progress: no change         VS: Refused Vital Signs   Denies lightheadedness with walking.    O2: Refuses cont pulse ox. Denies SOB/chest pain.    Output: Incontinent of B/B. Wearing brief. Urology advises urine samples to be placed in cup with date/time. Pt states she cannot recall if she has had blood in urine today. Dysuria.   Last BM: 12/23   Activity: Ax1 GB/walker   Skin: Visible skin intact   Pain: Pain managed with PRN meds   CMS: BLE neuropathy last few monthsnumbness and tingling started one month ago; Medicine MD aware  A&Ox4   Dressing: none   Diet: Regular   LDA: R PIV SL   Equipment: IV pole, personal belongings, food from home   Plan: Continue POC   Additional Urology referral placed today.      Pt refused continuous pulse ox.     See OT encounter dated today.

## 2024-12-23 NOTE — CONSULTS
"Urology Consult History and Physical    Name: Camelia Corea    MRN: [id number]   YOB: 2000       We were asked to see Camelia Corea at the request of Dr. Avitia for evaluation and treatment of the following chief complaint:          Chief Complaint:   \"Hematuria x 1 week\"    History is obtained from the patient           History of Present Illness:   Camelia Corea is a 24 year old female with history of polysubstance use disorder who presented to the Ed on 12/21/24 with UTI and generalized weakness. She had a recent admission for pancytopenia and was recommended an outpatient evaluation with colonoscopy and upper endoscopy. Urology is consulted for evaluation of hematuria.    She is afebrile and vitally stable. No accurate I/O or bladder scans recorded. Urine culture growing E. Coli with sensitivities pending. She is currently being treated with ceftriaxone. On admission Hgb 6.3 for which she received 1U pRBC with more than adequate response to 8.1 on recheck. Her hemoglobin has subsequently been stable. UA nitrite positive on admission with + LE, pyuria, bacteriuria, and >182 RBCs. She has had previous urinalyses with microhematuria on 10/22/24, 9/26/24, and 10/15/23 with corresponding cultures demonstrating MUF or E. Coli.     She reports having dysuria and hematuria for two weeks. She denies a past history of hematuria. She is not currently menstruating.           Past Medical History:     Past Medical History:   Diagnosis Date    Vitamin B12 deficiency (non anemic)    Polysubstance use disorder  Anemia         Past Surgical History:   History reviewed. No pertinent surgical history.         Social History:     Social History     Tobacco Use    Smoking status: Every Day     Types: Vaping Device    Smokeless tobacco: Never   Substance Use Topics    Alcohol use: No            Family History:     Family History   Problem Relation Age of Onset    Substance Abuse Sister     Substance Abuse Sister            "  Allergies:   No Known Allergies         Medications:     Current Facility-Administered Medications   Medication Dose Route Frequency Provider Last Rate Last Admin    acetaminophen (TYLENOL) tablet 650 mg  650 mg Oral Q4H PRN Marques Anna PA-C        Or    acetaminophen (TYLENOL) Suppository 650 mg  650 mg Rectal Q4H PRN Marques Anna PA-C        calcium carbonate (TUMS) chewable tablet 1,000 mg  1,000 mg Oral 4x Daily PRN Marques Anna PA-C        cefTRIAXone (ROCEPHIN) 1 g vial to attach to  mL bag for ADULTS or NS 50 mL bag for PEDS  1 g Intravenous Q24H Marques Anna PA-C   1 g at 12/22/24 1610    [START ON 12/25/2024] cyanocobalamin (VITAMIN B-12) sublingual tablet 1,000 mcg  1,000 mcg Sublingual Daily Dwight Dykes MD        cyanocobalamin injection 1,000 mcg  1,000 mcg Intramuscular Daily Dwight Dykes MD   1,000 mcg at 12/23/24 0842    folic acid injection 1 mg  1 mg Intravenous Daily Laila Morillo MD   1 mg at 12/23/24 0841    gabapentin (NEURONTIN) capsule 100 mg  100 mg Oral TID Marques Anna PA-C   100 mg at 12/23/24 0841    hydrOXYzine HCl (ATARAX) tablet 25 mg  25 mg Oral Q6H PRN Marques Anna PA-C   25 mg at 12/23/24 1128    Or    hydrOXYzine HCl (ATARAX) tablet 50 mg  50 mg Oral Q6H PRN Marques Anna PA-C        lidocaine (LMX4) cream   Topical Q1H PRN Marques Anna PA-C        lidocaine 1 % 0.1-1 mL  0.1-1 mL Other Q1H PRN Marques Anna PA-C        metroNIDAZOLE (FLAGYL) tablet 500 mg  500 mg Oral BID Marques Anna PA-C   500 mg at 12/23/24 0841    OLANZapine (zyPREXA) injection 2.5 mg  2.5 mg Intramuscular Daily PRN Marques Anna PA-C        ondansetron (ZOFRAN ODT) ODT tab 4 mg  4 mg Oral Q6H PRN Marques Anna PA-C        Or    ondansetron (ZOFRAN) injection 4 mg  4 mg Intravenous Q6H PRN Marques Anna PA-C        pantoprazole (PROTONIX) IV push injection 40 mg  40 mg Intravenous Q24H Marques Anna PA-C   40 mg at 12/22/24 3892    polyethylene glycol (MIRALAX)  "Packet 17 g  17 g Oral BID PRN Marques Anna PA-C        prochlorperazine (COMPAZINE) injection 10 mg  10 mg Intravenous Q6H PRN Marques Anna PA-STEVE        Or    prochlorperazine (COMPAZINE) tablet 10 mg  10 mg Oral Q6H PRN Marques Anna PA-STEVE        senna-docusate (SENOKOT-S/PERICOLACE) 8.6-50 MG per tablet 1 tablet  1 tablet Oral BID PRN Marques Anna PA-C        Or    senna-docusate (SENOKOT-S/PERICOLACE) 8.6-50 MG per tablet 2 tablet  2 tablet Oral BID PRN Marques Anna PA-C        sodium chloride (PF) 0.9% PF flush 3 mL  3 mL Intracatheter Q8H Marques Anna PA-C   3 mL at 12/23/24 1023    sodium chloride (PF) 0.9% PF flush 3 mL  3 mL Intracatheter q1 min prn Marques Anna PA-C                 Review of Systems:    ROS: See HPI for pertinent details.  Remainder of 10-point ROS negative.         Physical Exam:   VS:  T: 98.3    HR: 86    BP: 116/62    RR: 16   GEN:  NAD, lying in bed, responds appropriately to questions  HEENT:  Atraumatic  CV:  Extremities appear well perfused   LUNGS: Non-labored breathing on room air, no use of accessory muscles of respiration  ABD:  soft, non-tender, non-distended, no suprapubic tenderness  :  Crook absent  EXT:  Warm, well perfused.  No lower extremity edema bilaterally  SKIN:  Warm.  Dry.  No rashes.  NEURO:  Grossly moving all extremities            Data:   All laboratory data reviewed:    No results found for: \"PSA\"  Recent Labs   Lab 12/23/24  0654 12/22/24  2158 12/22/24  1722 12/22/24  1358 12/22/24  0517 12/21/24  2203 12/21/24  1440   WBC 4.8  --  5.7  --  6.5  --  6.7   HGB 8.1*  8.1* 8.4* 8.1* 9.1* 7.9*   < > 6.3*     --  119*  --  98*  --  98*    < > = values in this interval not displayed.       Recent Labs   Lab 12/22/24  0517 12/21/24  1440    144   POTASSIUM 4.5 4.8   CHLORIDE 108* 110*   CO2 24 27   BUN 30.3* 21.5*   CR 0.80 0.71   * 135*   LIANA 8.9 8.8       Recent Labs   Lab 12/21/24  1412   COLOR Yellow   APPEARANCE Cloudy*   URINEGLC 30* "   URINEBILI Negative   URINEKETONE Trace*   SG 1.027   URINEPH 6.0   PROTEIN 200*   NITRITE Positive*   LEUKEST Large*   RBCU >182*   WBCU >182*     Results for orders placed or performed during the hospital encounter of 12/21/24   Urine Culture    Collection Time: 12/21/24  2:12 PM    Specimen: Urine, Clean Catch   Result Value Ref Range    Culture 50,000-100,000 CFU/mL Escherichia coli (A)     Culture 10,000-50,000 CFU/mL Escherichia coli (A)    Results for orders placed or performed during the hospital encounter of 10/22/24   Urine Culture    Collection Time: 10/22/24  6:14 PM    Specimen: Urine, Midstream   Result Value Ref Range    Culture >100,000 CFU/mL Mixture of urogenital richi        All pertinent imaging reviewed:  CTA A/P 12/22/2024  IMPRESSION:  1. Hyperenhancement of the urinary bladder mucosa with no evidence of  hemorrhage altogether suggestive of cystitis.  2. No evidence of hemorrhage in the abdomen or pelvis.  3. Severe distention of the stomach from ingested contents.  Heterogeneous density of the gastric contents without active  extravasation may suggest occult or resolved gastric hemorrhage.  Consider endoscopy for further evaluation.         Impression and Plan:   Impression / Plan:   Camelia Corea is a 24 year old female with history of polysubstance use disorder and pancytopenia who presented with generalized weakness and E coli UTI. Urology is consulted for evaluation of hematuria.   She is afebrile and hemodynamically stable. Labs notable for no leukocytosis, Hgb stable, and creatinine at baseline. Imaging with findings consistent with cystitis with no other acute findings of the  tract. Urine culture growing E. Coli awaiting sensitivities. History of microhematuria in the setting of negative Ucx.     - Continue antibiotics for treatment of UTI, narrow based on sensitivities  - Recommend gross hematuria evaluation with outpatient cystoscopy, urology will send message and patient will get  phone call to schedule this.    - Urology will sign off, please reach out with questions or concerns      Discussed with Dr. Romero    Thank you for the opportunity to participate in the care of Camelia Corea.     Sesar Guallpa MD PGY4  Urology Resident

## 2024-12-23 NOTE — PLAN OF CARE
Goal Outcome Evaluation:      Plan of Care Reviewed With: patient    Overall Patient Progress: improvingOverall Patient Progress: improving       VS: VSS   O2: Refuses cont. Pulse ox, O2 WDL on RA. Denies SOB/CP   Output: Incontinent of B/B. Wearing brief   Last BM: 12/22   Activity: AO1 GB/walker   Skin: intact   Pain: Denies    CMS: BLE neuropathy last few months    Dressing: none   Diet: Regular   LDA: R PIV SL   Equipment: IV pole, personal belongings, food from home   Plan: Continue POC

## 2024-12-23 NOTE — CONSULTS
Care Management Initial Consult    General Information  Assessment completed with: Patient,         Primary Care Provider verified and updated as needed:     Readmission within the last 30 days: no previous admission in last 30 days      Reason for Consult: other (see comments) (elevated readmission risk score)  Advance Care Planning: Advance Care Planning Reviewed: no concerns identified          Communication Assessment  Patient's communication style: spoken language (English or Bilingual)             Cognitive  Cognitive/Neuro/Behavioral: WDL  Level of Consciousness: alert  Arousal Level: opens eyes spontaneously  Orientation: oriented x 4  Mood/Behavior: calm, cooperative  Best Language: 0 - No aphasia  Speech: logical    Living Environment:   People in home: alone     Current living Arrangements: house      Able to return to prior arrangements: yes       Family/Social Support:  Care provided by: self, parent(s)  Provides care for: no one     Support system: Parent(s)          Description of Support System: Supportive, Involved         Current Resources:   Patient receiving home care services: No        Community Resources: None  Equipment currently used at home: none  Supplies currently used at home: None    Employment/Financial:  Employment Status: other (see comments) (Was employed until getting sick at the begining of the month, anticipates returning to work once discharged.)        Financial Concerns: none   Referral to Financial Worker: No       Does the patient's insurance plan have a 3 day qualifying hospital stay waiver?  No    Lifestyle & Psychosocial Needs:  Social Drivers of Health     Food Insecurity: Low Risk  (10/23/2024)    Food Insecurity     Within the past 12 months, did you worry that your food would run out before you got money to buy more?: No     Within the past 12 months, did the food you bought just not last and you didn t have money to get more?: No   Depression: Not at risk (11/5/2024)     PHQ-2     PHQ-2 Score: 0   Housing Stability: Low Risk  (10/23/2024)    Housing Stability     Do you have housing? : Yes     Are you worried about losing your housing?: No   Tobacco Use: High Risk (12/21/2024)    Patient History     Smoking Tobacco Use: Every Day     Smokeless Tobacco Use: Never     Passive Exposure: Not on file   Financial Resource Strain: Low Risk  (10/23/2024)    Financial Resource Strain     Within the past 12 months, have you or your family members you live with been unable to get utilities (heat, electricity) when it was really needed?: No   Alcohol Use: Not At Risk (2/6/2019)    AUDIT-C     Frequency of Alcohol Consumption: Never     Average Number of Drinks: Not on file     Frequency of Binge Drinking: Not on file   Transportation Needs: Low Risk  (10/23/2024)    Transportation Needs     Within the past 12 months, has lack of transportation kept you from medical appointments, getting your medicines, non-medical meetings or appointments, work, or from getting things that you need?: No   Physical Activity: Not on File (7/13/2021)    [facility] from [facility]    Physical Activity     Physical Activity: 0   Interpersonal Safety: Low Risk  (10/23/2024)    Interpersonal Safety     Do you feel physically and emotionally safe where you currently live?: Yes     Within the past 12 months, have you been hit, slapped, kicked or otherwise physically hurt by someone?: No     Within the past 12 months, have you been humiliated or emotionally abused in other ways by your partner or ex-partner?: No   Stress: Not on File (7/13/2021)    [facility] from [facility]    Stress     Stress: 0   Social Connections: Not on File (7/13/2021)    [facility] from [facility]    Social Connections     Social Connections and Isolation: 0   Health Literacy: Not on file       Functional Status:  Prior to admission patient needed assistance:              Mental Health Status:  Mental Health Status: No Current Concerns        Chemical Dependency Status:  Chemical Dependency Status: No Current Concerns             Values/Beliefs:  Spiritual, Cultural Beliefs, Yarsanism Practices, Values that affect care: no               Discussed  Partnership in Safe Discharge Planning  document with patient/family: No    Additional Information:  SW consulted for high readmission risk score. SW met with pt at bedside to complete CMA. SW introduced self, role, and purpose of meeting. SW verified pts address, contacts, PCP, and insurance. SW noted there was no insurance on pts facesheet. Pt reported that she just got off the phone with someone from the hospital and gave them her insurance information. Pt reported she does not have a PCP but would like one. SW initiated [facility] task.     Pt reports she lives alone and is independent with all ADL's and iADL's including driving. Pt reported that her mom sometimes helps her manage finances and medication. Pt does not have any in home or community services. Pt does not have medical equipment in her home. Pt denies mental health or chem dep concerns. Pt reported she was working until she got sick at the beginning of the month, and anticipates working again once discharged. Pt anticipates discharging home.     Next Steps:   -No further care management intervention anticipated at this time.  Please re-consult if further needs arise.  Care management signing off.       ORESTES Herr   [facility]  Available via [employer]  Covering 10 ICU/ED SANDRA  Ph: [phone]

## 2024-12-24 VITALS
HEART RATE: 65 BPM | BODY MASS INDEX: 29.38 KG/M2 | SYSTOLIC BLOOD PRESSURE: 108 MMHG | DIASTOLIC BLOOD PRESSURE: 60 MMHG | RESPIRATION RATE: 16 BRPM | HEIGHT: 66 IN | WEIGHT: 182.8 LBS | TEMPERATURE: 98.3 F | OXYGEN SATURATION: 98 %

## 2024-12-24 LAB
HGB BLD-MCNC: 8.6 G/DL (ref 11.7–15.7)
VIT B1 PYROPHOSHATE BLD-SCNC: 145 NMOL/L

## 2024-12-24 PROCEDURE — 85018 HEMOGLOBIN: CPT | Performed by: INTERNAL MEDICINE

## 2024-12-24 PROCEDURE — 99239 HOSP IP/OBS DSCHRG MGMT >30: CPT | Performed by: INTERNAL MEDICINE

## 2024-12-24 PROCEDURE — 36415 COLL VENOUS BLD VENIPUNCTURE: CPT | Performed by: INTERNAL MEDICINE

## 2024-12-24 PROCEDURE — 250N000011 HC RX IP 250 OP 636: Performed by: INTERNAL MEDICINE

## 2024-12-24 PROCEDURE — 250N000013 HC RX MED GY IP 250 OP 250 PS 637

## 2024-12-24 RX ORDER — CEFDINIR 300 MG/1
300 CAPSULE ORAL 2 TIMES DAILY
Qty: 4 CAPSULE | Refills: 0 | Status: SHIPPED | OUTPATIENT
Start: 2024-12-24 | End: 2024-12-26

## 2024-12-24 RX ORDER — METRONIDAZOLE 500 MG/1
500 TABLET ORAL 2 TIMES DAILY
Qty: 6 TABLET | Refills: 0 | Status: SHIPPED | OUTPATIENT
Start: 2024-12-24 | End: 2024-12-27

## 2024-12-24 RX ADMIN — GABAPENTIN 100 MG: 100 CAPSULE ORAL at 08:52

## 2024-12-24 RX ADMIN — METRONIDAZOLE 500 MG: 500 TABLET ORAL at 08:52

## 2024-12-24 RX ADMIN — FOLIC ACID 1 MG: 5 INJECTION, SOLUTION INTRAMUSCULAR; INTRAVENOUS; SUBCUTANEOUS at 08:52

## 2024-12-24 RX ADMIN — CYANOCOBALAMIN 1000 MCG: 1000 INJECTION, SOLUTION INTRAMUSCULAR; SUBCUTANEOUS at 10:24

## 2024-12-24 RX ADMIN — GABAPENTIN 100 MG: 100 CAPSULE ORAL at 13:48

## 2024-12-24 ASSESSMENT — ACTIVITIES OF DAILY LIVING (ADL)
ADLS_ACUITY_SCORE: 73

## 2024-12-24 NOTE — PLAN OF CARE
"Goal Outcome Evaluation:       Alert and oriented X4.    Vitally srable, /60 (BP Location: Left arm, Patient Position: Supine, Cuff Size: Adult Regular)   Pulse 65   Temp 98.3  F (36.8  C) (Oral)   Resp 16   Ht 1.676 m (5' 6\")   Wt 82.9 kg (182 lb 12.8 oz)   LMP 10/18/2024 (Within Days)   SpO2 98%   Breastfeeding No   BMI 29.50 kg/m      Ambulates in the room from bed to chair with walker and standby assist,     Incontinent of bowel and bladder. Brief, gown and sheets changed. Offered warm blanket and patient accepted,    Patient requested for ice cream, it was given to her.     Right PIV saline locked.     Continue plan of care                                                                                                                                                                                                          "
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
## 2024-12-24 NOTE — PLAN OF CARE
Occupational Therapy Discharge Summary    Reason for therapy discharge:    Discharged to home.    Progress towards therapy goal(s). See goals on Care Plan in [facility] electronic health record for goal details.  Goals partially met.  Barriers to achieving goals:   discharge from facility.    Therapy recommendation(s):    Recommend pt receive assist for ADL/IADL at discharge. May benefit from OP PT for balance and OP OT for cognition if not improved.

## 2024-12-24 NOTE — PLAN OF CARE
VS: VSS   O2: O2 WDL on RA. Denies SOB/CP   Output: Incontinent of B/B. Wearing brief    Last BM: 12/23   Activity: AO1 walker   Skin: Visible skin CDI   Pain: Denied abdominal pain this shift   CMS: A&Ox4   Dressing: none   Diet: Regular   LDA: R PIV   Equipment: Personal belongings, food from home, IV pole   Plan: Continue POC       Plan of Care Reviewed With: patient    Overall Patient Progress: no changeOverall Patient Progress: no change    Outcome Evaluation: no actue change to pt condition this shift. incontinent of b/b, pt not aware of when she goes to the bathroom

## 2024-12-24 NOTE — PLAN OF CARE
"Goal Outcome Evaluation:      Plan of Care Reviewed With: patient    Overall Patient Progress: no changeOverall Patient Progress: no change    Outcome Evaluation: No change in pt progress this shift. Pt is doing well overall. No c/o of pain, SOB, chest pain. Some N/T to BLE  per pt report.    Neuro/Orientation: A&Ox4, able to make needs known, some N/T to BLE  Respiratory: Stable on Ra, denied SOB and chest pain.  Activity/Transfer: SBA with walker and gb  Diet: Reg/thin-pills whole  GI/: can be incontinent at times. No incontinence episode this shift. BM 12/24  Skin: visible skin intact.  Pain: denies. Did c/o of N/T  LDA: Right PIV-SL  Plan: Discharge today after showering, no other concerns as of now, continue with POC.   Additional Notes:  Patient most recent vitals:  /60 (BP Location: Left arm, Patient Position: Supine, Cuff Size: Adult Regular)   Pulse 65   Temp 98.3  F (36.8  C) (Oral)   Resp 16   Ht 1.676 m (5' 6\")   Wt 82.9 kg (182 lb 12.8 oz)   LMP 10/18/2024 (Within Days)   SpO2 98%   Breastfeeding No   BMI 29.50 kg/m        Pt. discharged at 1630 to home, and left with personal belongings. Pt. received complete discharge paperwork and all medications as filled by discharge pharmacy. Pt received and signed for the narcotic medication no narcotic send with patient. Pt. was given times of last dose for all discharge medications in writing on discharge medication sheets. Discharge teaching included medication, pain management, activity restrictions, dressing changes, and signs and symptoms of infection.   Pt. had no further questions at the time of discharge and no unmet needs were identified.    "

## 2024-12-24 NOTE — PLAN OF CARE
VS: VSS   O2: O2 WDL on RA. Denies SOB/CP   Output: Incontinent of B/B. Wearing brief    Last BM: 12/23   Activity: AO1 walker   Skin: Visible skin CDI   Pain: Denied abdominal pain this shift   CMS: A&Ox4   Dressing: none   Diet: Regular   LDA: R PIV   Equipment: Personal belongings, food from home, IV pole   Plan: Continue POC  Per urology, no need t       Plan of Care Reviewed With: patient    Overall Patient Progress: no changeOverall Patient Progress: no change    Outcome Evaluation: no actue change to pt condition this shift. incontinent of b/b, pt not aware of when she goes to the bathroom

## 2024-12-31 NOTE — TELEPHONE ENCOUNTER
MEDICAL RECORDS REQUEST   [facility]  Urology Clinic  [address] [place] [address]  PHONE: [phone]  Fax: [phone]        FUTURE VISIT INFORMATION                                                   Camelia Corea, : 2000 scheduled for future visit at [facility]    APPOINTMENT INFORMATION:  Date: 2025  Provider:  Kelle Leung PA  Reason for Visit/Diagnosis: hematuria      RECORDS REQUESTED FOR VISIT                                                     NOTES  STATUS/DETAILS   DISCHARGE REPORT from the ER  YES, 2024, 10/15/2023 -- [facility] ED   MEDICATION LIST  yes   LABS     URINALYSIS (UA)  yes   IMAGES  YES, 2024, 2024, 2021 -- CTA ABD PELVIS  2024, 10/22/2024, 2024 -- XR CHEST     PRE-VISIT CHECKLIST      Joint diagnostic appointment coordinated correctly          (ensure right order & amount of time) Yes   RECORD COLLECTION COMPLETE Yes

## 2025-01-12 ENCOUNTER — HEALTH MAINTENANCE LETTER (OUTPATIENT)
Age: 25
End: 2025-01-12

## (undated) RX ORDER — LIDOCAINE HYDROCHLORIDE 10 MG/ML
INJECTION, SOLUTION EPIDURAL; INFILTRATION; INTRACAUDAL; PERINEURAL
Status: DISPENSED
Start: 2023-08-22